# Patient Record
Sex: FEMALE | Race: WHITE | NOT HISPANIC OR LATINO | Employment: OTHER
[De-identification: names, ages, dates, MRNs, and addresses within clinical notes are randomized per-mention and may not be internally consistent; named-entity substitution may affect disease eponyms.]

---

## 2017-01-04 ENCOUNTER — RECORDS - HEALTHEAST (OUTPATIENT)
Dept: ADMINISTRATIVE | Facility: OTHER | Age: 67
End: 2017-01-04

## 2017-01-06 ENCOUNTER — COMMUNICATION - HEALTHEAST (OUTPATIENT)
Dept: FAMILY MEDICINE | Facility: CLINIC | Age: 67
End: 2017-01-06

## 2017-01-06 ENCOUNTER — AMBULATORY - HEALTHEAST (OUTPATIENT)
Dept: FAMILY MEDICINE | Facility: CLINIC | Age: 67
End: 2017-01-06

## 2017-01-25 ENCOUNTER — RECORDS - HEALTHEAST (OUTPATIENT)
Dept: ADMINISTRATIVE | Facility: OTHER | Age: 67
End: 2017-01-25

## 2017-02-07 ENCOUNTER — HOSPITAL ENCOUNTER (EMERGENCY)
Facility: CLINIC | Age: 67
Discharge: HOME | End: 2017-02-07
Attending: NURSE PRACTITIONER | Admitting: NURSE PRACTITIONER
Payer: COMMERCIAL

## 2017-02-07 VITALS
TEMPERATURE: 97.9 F | OXYGEN SATURATION: 95 % | DIASTOLIC BLOOD PRESSURE: 73 MMHG | SYSTOLIC BLOOD PRESSURE: 157 MMHG | RESPIRATION RATE: 16 BRPM

## 2017-02-07 DIAGNOSIS — S41.111A ARM LACERATION, RIGHT, INITIAL ENCOUNTER: ICD-10-CM

## 2017-02-07 PROCEDURE — 12002 RPR S/N/AX/GEN/TRNK2.6-7.5CM: CPT | Performed by: NURSE PRACTITIONER

## 2017-02-07 PROCEDURE — 99213 OFFICE O/P EST LOW 20 MIN: CPT | Mod: 25 | Performed by: NURSE PRACTITIONER

## 2017-02-07 PROCEDURE — 99202 OFFICE O/P NEW SF 15 MIN: CPT | Mod: 25 | Performed by: NURSE PRACTITIONER

## 2017-02-07 ASSESSMENT — ENCOUNTER SYMPTOMS
NEUROLOGICAL NEGATIVE: 1
RESPIRATORY NEGATIVE: 1
CONSTITUTIONAL NEGATIVE: 1

## 2017-02-07 NOTE — ED AVS SNAPSHOT
[facility] Emergency Department    [address] [facility] [address]    Phone:  [phone]    Fax:  [phone]                                       Tayler Mcmahon   MRN: [id number]    Department:  [facility] Emergency Department   Date of Visit:  2/7/2017           After Visit Summary Signature Page     I have received my discharge instructions, and my questions have been answered. I have discussed any challenges I see with this plan with the nurse or doctor.    ..........................................................................................................................................  Patient/Patient Representative Signature      ..........................................................................................................................................  Patient Representative Print Name and Relationship to Patient    ..................................................               ................................................  Date                                            Time    ..........................................................................................................................................  Reviewed by Signature/Title    ...................................................              ..............................................  Date                                                            Time

## 2017-02-07 NOTE — ED AVS SNAPSHOT
[facility] Emergency Department    [address] [facility] [address]    Phone:  [phone]    Fax:  [phone]                                       Tayler Mcmahon   MRN: [id number]    Department:  [facility] Emergency Department   Date of Visit:  2/7/2017           Patient Information     Date Of Birth          1950        Your diagnoses for this visit were:     Arm laceration, right, initial encounter        You were seen by Avery Blackman, ODALYS RUIZ.      Follow-up Information     Please follow up.    Why:  For suture removal        Discharge Instructions          * LACERATION (All Closures)  A laceration is a cut through the skin. This will usually require stitches (sutures) or staples if it is deep. Minor cuts may be treated with a tape closure ( Steri-Strips ) or Dermabond skin glue.       HOME CARE:  PAIN MEDICINE: You may use acetaminophen (Tylenol) 650-1000 mg every 6 hours or ibuprofen (Motrin, Advil) 600 mg every 6-8 hours with food to control pain, if you are able to take these medicines. [NOTE: If you have chronic liver or kidney disease or ever had a stomach ulcer or GI bleeding, talk with your doctor before using these medicines.]  EXTREMITY, FACE or TRUNK WOUNDS:    Keep the wound clean and dry. If a bandage was applied and it becomes wet or dirty, replace it. Otherwise, leave it in place for the first 24 hours.    If stitches or staples were used, clean the wound daily. Protect the wound from sunlight and tanning lamps.    After removing the bandage, wash the area with soap and water. Use a wet cotton swab (Q tip) to loosen and remove any blood or crust that forms.    After cleaning, apply a thin layer of Polysporin or Bacitracin ointment. This will keep the wound clean and make it easier to remove the stitches or staples. Reapply a fresh bandage.    You may remove the bandage to shower as usual after the first 24 hours, but do not soak the area in water (no  swimming) until the stitches or staples are removed.    If Steri-Strips were used, keep the area clean and dry. If it becomes wet, blot it dry with a towel. It is okay to take a brief shower, but avoid scrubbing the area.    If Dermabond skin adhesive was used, do not scratch, rub or pick at the adhesive film. Do not place tape directly over the film. Do not apply liquid, ointment or creams to the wound while the film is in place. Do not clean the wound with peroxide and do not apply ointments. Avoid activities that cause heavy sweating until the film has fallen off. Protect the wound from prolonged exposure to sunlight or tanning lamps. You may shower as usual but do not soak the wound in water (no baths or swimming). The film will fall off by itself in 5-10 days.  SCALP WOUNDS: During the first two days, you may carefully rinse your hair in the shower to remove blood, glass or dirt particles. After two days, you may shower and shampoo your hair normally. Do not soak your scalp in the tub or go swimming until the stitches or staples have been removed.  MOUTH WOUNDS: Eat soft foods to reduce pain. If the cut is inside of your mouth, clean by rinsing after each meal and at bedtime with a mixture of equal parts water and Hydrogen Peroxide (do not swallow!). Or, you can use a cotton swab to directly apply Hydrogen Peroxide onto the cut.  After the wound is done healing, use sunscreen over the area whenever exposed for the next 6 minths to avoid a darker scar.     FOLLOW UP: Most skin wounds heal within ten days. Mouth and facial wounds heal within five days. However, even with proper treatment, a wound infection may sometimes occur. Therefore, you should check the wound daily for signs of infection listed below.  Stitches should be removed from the face within five days; stitches and staples should be removed from other parts of the body within 7-10 days. Unless you are told to come back to the emergency room, you may  have your doctor or urgent care remove the stitches. If dissolving stitches were used in the mouth, these will fall out or dissolve without the need for removal. If tape closures ( Steri-Strips ) were used, remove them yourself if they have not fallen off after 7 days. If Dermabond skin glue was used, the film will fall off by itself in 5-10 days.      GET PROMPT MEDICAL ATTENTION  if any of the following occur:    Increasing pain in the wound    Redness, swelling or pus coming from the wound    Fever over 101 F (38.3 C) oral    If stitches or staples come apart or fall out or if Steri-Strips fall off before seven days    If the wound edges re-open    Bleeding not controlled by direct pressure    1108-7688 [facility], [address] [place] [address]. All rights reserved. This information is not intended as a substitute for professional medical care. Always follow your healthcare professional's instructions.      24 Hour Appointment Hotline       To make an appointment at any [facility], call [phone] ([phone]). If you don't have a family doctor or clinic, we will help you find one. [facility] clinics are conveniently located to serve the needs of you and your family.             Review of your medicines      Notice     You have not been prescribed any medications.            Orders Needing Specimen Collection     None      Pending Results     No orders found from 2/6/2017 to 2/8/2017.            Pending Culture Results     No orders found from 2/6/2017 to 2/8/2017.             Test Results from your hospital stay            Thank you for choosing [facility]       Thank you for choosing [facility] for your care. Our goal is always to provide you with excellent care. Hearing back from our patients is one way we can continue to improve our services. Please take a few minutes to complete the written survey that you may receive in the mail after you visit with us. Thank you!        Jolene  "Information     [facility] lets you send messages to your doctor, view your test results, renew your prescriptions, schedule appointments and more. To sign up, go to [email or web address] . Click on \"Log in\" on the left side of the screen, which will take you to the Welcome page. Then click on \"Sign up Now\" on the right side of the page.     You will be asked to enter the access code listed below, as well as some personal information. Please follow the directions to create your username and password.     Your access code is: [email or web address]  Expires: 2017  1:11 PM     Your access code will  in 90 days. If you need help or a new code, please call your [facility] clinic or [phone].        Care EveryWhere ID     This is your Care EveryWhere ID. This could be used by other organizations to access your [facility] medical records  [id number]        After Visit Summary       This is your record. Keep this with you and show to your community pharmacist(s) and doctor(s) at your next visit.                  "

## 2017-02-07 NOTE — ED PROVIDER NOTES
History     Chief Complaint   Patient presents with     Laceration     right elbow area     HPI  Tayler Mcmahon is a 67 year old female who fell while walking down the stairs carrying a laundry basket. She sustained a laceration as she fell when her arm struck the bottom step. It did not bleed very much. Her tetanus is utd. She has a sensitivity to vicryl suture which caused a significant inflammatory reaction both times she has used it.     I have reviewed the Medications, Allergies, Past Medical and Surgical History, and Social History in the Epic system.    Review of Systems   Constitutional: Negative.    Respiratory: Negative.    Neurological: Negative.    All other systems reviewed and are negative.      Physical Exam   BP: 157/73 mmHg  Heart Rate: 67  Temp: 97.9  F (36.6  C)  Resp: 16  SpO2: 95 %  Physical Exam   Constitutional: She is oriented to person, place, and time. She appears well-developed.   Eyes: Conjunctivae and EOM are normal.   Pulmonary/Chest: Effort normal and breath sounds normal.   Abdominal: Soft.   Musculoskeletal: Normal range of motion. She exhibits no edema or tenderness.   Normal cms to affected limb   Neurological: She is alert and oriented to person, place, and time. No cranial nerve deficit.   Skin: Skin is warm. No rash noted.       ED Course   Laceration repair  Date/Time: 2/7/2017 1:08 PM  Performed by: AYLIN CACERES  Authorized by: AYLIN CACERES  Consent: Verbal consent obtained.  Risks and benefits: risks, benefits and alternatives were discussed  Consent given by: patient  Patient identity confirmed: verbally with patient  Body area: upper extremity  Location details: right lower arm  Laceration length: 4 cm  Foreign bodies: no foreign bodies  Tendon involvement: none  Nerve involvement: none  Vascular damage: no  Anesthesia: local infiltration  Local anesthetic: lidocaine 1% with epinephrine  Anesthetic total: 6 ml  Patient sedated: no  Preparation:  Patient was prepped and draped in the usual sterile fashion.  Irrigation solution: saline  Irrigation method: jet lavage  Amount of cleaning: standard  Skin closure: 4-0 nylon  Number of sutures: 7  Technique: simple and horizontal mattress  Approximation: close  Approximation difficulty: simple  Dressing: antibiotic ointment and 4x4 sterile gauze  Patient tolerance: Patient tolerated the procedure well with no immediate complications  Comments: I used two horizontal mattress sutures for the stellate lesion and 5 simple interupted sutures to complete the repair.                    Labs Ordered and Resulted from Time of ED Arrival Up to the Time of Departure from the ED - No data to display    Assessments & Plan (with Medical Decision Making)   Laceration to right forearm    I have reviewed the nursing notes.    I have reviewed the findings, diagnosis, plan and need for follow up with the patient.  F/u in 10-14 days for suture removal    New Prescriptions    No medications on file       Final diagnoses:   Arm laceration, right, initial encounter       2/7/2017   [facility] EMERGENCY DEPARTMENT      Avery Blackman, ODALYS CNP  02/07/17 1315

## 2017-02-07 NOTE — DISCHARGE INSTRUCTIONS
* LACERATION (All Closures)  A laceration is a cut through the skin. This will usually require stitches (sutures) or staples if it is deep. Minor cuts may be treated with a tape closure ( Steri-Strips ) or Dermabond skin glue.       HOME CARE:  PAIN MEDICINE: You may use acetaminophen (Tylenol) 650-1000 mg every 6 hours or ibuprofen (Motrin, Advil) 600 mg every 6-8 hours with food to control pain, if you are able to take these medicines. [NOTE: If you have chronic liver or kidney disease or ever had a stomach ulcer or GI bleeding, talk with your doctor before using these medicines.]  EXTREMITY, FACE or TRUNK WOUNDS:    Keep the wound clean and dry. If a bandage was applied and it becomes wet or dirty, replace it. Otherwise, leave it in place for the first 24 hours.    If stitches or staples were used, clean the wound daily. Protect the wound from sunlight and tanning lamps.    After removing the bandage, wash the area with soap and water. Use a wet cotton swab (Q tip) to loosen and remove any blood or crust that forms.    After cleaning, apply a thin layer of Polysporin or Bacitracin ointment. This will keep the wound clean and make it easier to remove the stitches or staples. Reapply a fresh bandage.    You may remove the bandage to shower as usual after the first 24 hours, but do not soak the area in water (no swimming) until the stitches or staples are removed.    If Steri-Strips were used, keep the area clean and dry. If it becomes wet, blot it dry with a towel. It is okay to take a brief shower, but avoid scrubbing the area.    If Dermabond skin adhesive was used, do not scratch, rub or pick at the adhesive film. Do not place tape directly over the film. Do not apply liquid, ointment or creams to the wound while the film is in place. Do not clean the wound with peroxide and do not apply ointments. Avoid activities that cause heavy sweating until the film has fallen off. Protect the wound from prolonged  exposure to sunlight or tanning lamps. You may shower as usual but do not soak the wound in water (no baths or swimming). The film will fall off by itself in 5-10 days.  SCALP WOUNDS: During the first two days, you may carefully rinse your hair in the shower to remove blood, glass or dirt particles. After two days, you may shower and shampoo your hair normally. Do not soak your scalp in the tub or go swimming until the stitches or staples have been removed.  MOUTH WOUNDS: Eat soft foods to reduce pain. If the cut is inside of your mouth, clean by rinsing after each meal and at bedtime with a mixture of equal parts water and Hydrogen Peroxide (do not swallow!). Or, you can use a cotton swab to directly apply Hydrogen Peroxide onto the cut.  After the wound is done healing, use sunscreen over the area whenever exposed for the next 6 minths to avoid a darker scar.     FOLLOW UP: Most skin wounds heal within ten days. Mouth and facial wounds heal within five days. However, even with proper treatment, a wound infection may sometimes occur. Therefore, you should check the wound daily for signs of infection listed below.  Stitches should be removed from the face within five days; stitches and staples should be removed from other parts of the body within 7-10 days. Unless you are told to come back to the emergency room, you may have your doctor or urgent care remove the stitches. If dissolving stitches were used in the mouth, these will fall out or dissolve without the need for removal. If tape closures ( Steri-Strips ) were used, remove them yourself if they have not fallen off after 7 days. If Dermabond skin glue was used, the film will fall off by itself in 5-10 days.      GET PROMPT MEDICAL ATTENTION  if any of the following occur:    Increasing pain in the wound    Redness, swelling or pus coming from the wound    Fever over 101 F (38.3 C) oral    If stitches or staples come apart or fall out or if Steri-Strips fall  off before seven days    If the wound edges re-open    Bleeding not controlled by direct pressure    [id number] [employer] [facility], [address] [place] [address], [place] [address]. All rights reserved. This information is not intended as a substitute for professional medical care. Always follow your healthcare professional's instructions.

## 2017-02-15 ENCOUNTER — OFFICE VISIT - HEALTHEAST (OUTPATIENT)
Dept: FAMILY MEDICINE | Facility: CLINIC | Age: 67
End: 2017-02-15

## 2017-02-15 DIAGNOSIS — L03.90 CELLULITIS: ICD-10-CM

## 2017-02-15 ASSESSMENT — MIFFLIN-ST. JEOR: SCORE: 1220.76

## 2017-03-29 ENCOUNTER — RECORDS - HEALTHEAST (OUTPATIENT)
Dept: ADMINISTRATIVE | Facility: OTHER | Age: 67
End: 2017-03-29

## 2017-04-25 ENCOUNTER — HOSPITAL ENCOUNTER (OUTPATIENT)
Dept: CT IMAGING | Facility: HOSPITAL | Age: 67
Discharge: HOME | End: 2017-04-25
Attending: INTERNAL MEDICINE

## 2017-04-25 DIAGNOSIS — R05.9 COUGH: ICD-10-CM

## 2017-04-25 DIAGNOSIS — R91.1 PULMONARY NODULE: ICD-10-CM

## 2017-05-08 ENCOUNTER — RECORDS - HEALTHEAST (OUTPATIENT)
Dept: ADMINISTRATIVE | Facility: OTHER | Age: 67
End: 2017-05-08

## 2017-05-22 ENCOUNTER — COMMUNICATION - HEALTHEAST (OUTPATIENT)
Dept: FAMILY MEDICINE | Facility: CLINIC | Age: 67
End: 2017-05-22

## 2017-05-23 ENCOUNTER — RECORDS - HEALTHEAST (OUTPATIENT)
Dept: ADMINISTRATIVE | Facility: OTHER | Age: 67
End: 2017-05-23

## 2017-06-01 ENCOUNTER — COMMUNICATION - HEALTHEAST (OUTPATIENT)
Dept: FAMILY MEDICINE | Facility: CLINIC | Age: 67
End: 2017-06-01

## 2017-06-01 DIAGNOSIS — R45.86 MOOD CHANGES: ICD-10-CM

## 2017-06-08 ENCOUNTER — COMMUNICATION - HEALTHEAST (OUTPATIENT)
Dept: FAMILY MEDICINE | Facility: CLINIC | Age: 67
End: 2017-06-08

## 2017-06-09 ENCOUNTER — AMBULATORY - HEALTHEAST (OUTPATIENT)
Dept: FAMILY MEDICINE | Facility: CLINIC | Age: 67
End: 2017-06-09

## 2017-06-12 ENCOUNTER — OFFICE VISIT - HEALTHEAST (OUTPATIENT)
Dept: FAMILY MEDICINE | Facility: CLINIC | Age: 67
End: 2017-06-12

## 2017-06-12 DIAGNOSIS — J40 BRONCHITIS: ICD-10-CM

## 2017-06-13 ENCOUNTER — COMMUNICATION - HEALTHEAST (OUTPATIENT)
Dept: FAMILY MEDICINE | Facility: CLINIC | Age: 67
End: 2017-06-13

## 2017-07-05 ENCOUNTER — COMMUNICATION - HEALTHEAST (OUTPATIENT)
Dept: FAMILY MEDICINE | Facility: CLINIC | Age: 67
End: 2017-07-05

## 2017-07-05 DIAGNOSIS — K21.9 GERD (GASTROESOPHAGEAL REFLUX DISEASE): ICD-10-CM

## 2017-07-05 DIAGNOSIS — R45.86 MOOD CHANGES: ICD-10-CM

## 2017-10-13 ENCOUNTER — COMMUNICATION - HEALTHEAST (OUTPATIENT)
Dept: FAMILY MEDICINE | Facility: CLINIC | Age: 67
End: 2017-10-13

## 2017-10-13 DIAGNOSIS — R45.86 MOOD CHANGES: ICD-10-CM

## 2017-10-13 DIAGNOSIS — K21.9 GERD (GASTROESOPHAGEAL REFLUX DISEASE): ICD-10-CM

## 2017-11-08 ENCOUNTER — COMMUNICATION - HEALTHEAST (OUTPATIENT)
Dept: FAMILY MEDICINE | Facility: CLINIC | Age: 67
End: 2017-11-08

## 2017-12-30 ENCOUNTER — COMMUNICATION - HEALTHEAST (OUTPATIENT)
Dept: FAMILY MEDICINE | Facility: CLINIC | Age: 67
End: 2017-12-30

## 2017-12-30 DIAGNOSIS — K21.9 GERD (GASTROESOPHAGEAL REFLUX DISEASE): ICD-10-CM

## 2018-01-08 ENCOUNTER — RECORDS - HEALTHEAST (OUTPATIENT)
Dept: ADMINISTRATIVE | Facility: OTHER | Age: 68
End: 2018-01-08

## 2018-01-12 ENCOUNTER — OFFICE VISIT - HEALTHEAST (OUTPATIENT)
Dept: FAMILY MEDICINE | Facility: CLINIC | Age: 68
End: 2018-01-12

## 2018-01-12 DIAGNOSIS — R20.8 RECTAL BURNING: ICD-10-CM

## 2018-01-12 DIAGNOSIS — N94.89 VAGINAL BURNING: ICD-10-CM

## 2018-01-12 LAB
CLUE CELLS: NORMAL
TRICHOMONAS, WET PREP: NORMAL
YEAST, WET PREP: NORMAL

## 2018-03-08 ENCOUNTER — COMMUNICATION - HEALTHEAST (OUTPATIENT)
Dept: FAMILY MEDICINE | Facility: CLINIC | Age: 68
End: 2018-03-08

## 2018-03-08 DIAGNOSIS — K21.9 GERD (GASTROESOPHAGEAL REFLUX DISEASE): ICD-10-CM

## 2018-03-08 DIAGNOSIS — R45.86 MOOD CHANGES: ICD-10-CM

## 2018-03-22 ENCOUNTER — HOSPITAL ENCOUNTER (OUTPATIENT)
Dept: MAMMOGRAPHY | Facility: CLINIC | Age: 68
Discharge: HOME | End: 2018-03-22
Attending: FAMILY MEDICINE

## 2018-03-22 DIAGNOSIS — Z12.31 VISIT FOR SCREENING MAMMOGRAM: ICD-10-CM

## 2018-04-09 ENCOUNTER — OFFICE VISIT - HEALTHEAST (OUTPATIENT)
Dept: FAMILY MEDICINE | Facility: CLINIC | Age: 68
End: 2018-04-09

## 2018-04-09 DIAGNOSIS — Z23 NEED FOR VACCINATION: ICD-10-CM

## 2018-04-09 DIAGNOSIS — Z00.00 HEALTHCARE MAINTENANCE: ICD-10-CM

## 2018-04-09 DIAGNOSIS — K21.9 GASTROESOPHAGEAL REFLUX DISEASE WITHOUT ESOPHAGITIS: ICD-10-CM

## 2018-04-09 DIAGNOSIS — Z78.0 POST-MENOPAUSAL: ICD-10-CM

## 2018-04-09 DIAGNOSIS — E83.42 HYPOMAGNESEMIA: ICD-10-CM

## 2018-04-09 DIAGNOSIS — F32.5 MAJOR DEPRESSIVE DISORDER WITH SINGLE EPISODE, IN FULL REMISSION (H): ICD-10-CM

## 2018-04-09 LAB
ALBUMIN SERPL-MCNC: 3.6 G/DL (ref 3.5–5)
ALP SERPL-CCNC: 90 U/L (ref 45–120)
ALT SERPL W P-5'-P-CCNC: 24 U/L (ref 0–45)
ANION GAP SERPL CALCULATED.3IONS-SCNC: 9 MMOL/L (ref 5–18)
AST SERPL W P-5'-P-CCNC: 17 U/L (ref 0–40)
BILIRUB SERPL-MCNC: 0.5 MG/DL (ref 0–1)
BUN SERPL-MCNC: 17 MG/DL (ref 8–22)
CALCIUM SERPL-MCNC: 9.5 MG/DL (ref 8.5–10.5)
CHLORIDE BLD-SCNC: 103 MMOL/L (ref 98–107)
CHOLEST SERPL-MCNC: 237 MG/DL
CO2 SERPL-SCNC: 28 MMOL/L (ref 22–31)
CREAT SERPL-MCNC: 0.74 MG/DL (ref 0.6–1.1)
FASTING STATUS PATIENT QL REPORTED: YES
GFR SERPL CREATININE-BSD FRML MDRD: >60 ML/MIN/1.73M2
GLUCOSE BLD-MCNC: 100 MG/DL (ref 70–125)
HDLC SERPL-MCNC: 54 MG/DL
LDLC SERPL CALC-MCNC: 152 MG/DL
MAGNESIUM SERPL-MCNC: 1.8 MG/DL (ref 1.8–2.6)
POTASSIUM BLD-SCNC: 4.9 MMOL/L (ref 3.5–5)
PROT SERPL-MCNC: 6.9 G/DL (ref 6–8)
SODIUM SERPL-SCNC: 140 MMOL/L (ref 136–145)
TRIGL SERPL-MCNC: 155 MG/DL

## 2018-04-09 ASSESSMENT — MIFFLIN-ST. JEOR: SCORE: 1242.87

## 2018-04-16 ENCOUNTER — AMBULATORY - HEALTHEAST (OUTPATIENT)
Dept: SCHEDULING | Facility: CLINIC | Age: 68
End: 2018-04-16

## 2018-04-16 ENCOUNTER — COMMUNICATION - HEALTHEAST (OUTPATIENT)
Dept: FAMILY MEDICINE | Facility: CLINIC | Age: 68
End: 2018-04-16

## 2018-04-16 DIAGNOSIS — Z78.0 POST-MENOPAUSAL: ICD-10-CM

## 2018-04-17 ENCOUNTER — COMMUNICATION - HEALTHEAST (OUTPATIENT)
Dept: FAMILY MEDICINE | Facility: CLINIC | Age: 68
End: 2018-04-17

## 2018-04-18 ENCOUNTER — COMMUNICATION - HEALTHEAST (OUTPATIENT)
Dept: FAMILY MEDICINE | Facility: CLINIC | Age: 68
End: 2018-04-18

## 2018-05-07 ENCOUNTER — COMMUNICATION - HEALTHEAST (OUTPATIENT)
Dept: FAMILY MEDICINE | Facility: CLINIC | Age: 68
End: 2018-05-07

## 2018-12-24 ENCOUNTER — COMMUNICATION - HEALTHEAST (OUTPATIENT)
Dept: SCHEDULING | Facility: CLINIC | Age: 68
End: 2018-12-24

## 2019-01-07 ENCOUNTER — COMMUNICATION - HEALTHEAST (OUTPATIENT)
Dept: FAMILY MEDICINE | Facility: CLINIC | Age: 69
End: 2019-01-07

## 2019-01-07 DIAGNOSIS — K21.9 GERD (GASTROESOPHAGEAL REFLUX DISEASE): ICD-10-CM

## 2019-01-07 DIAGNOSIS — R45.86 MOOD CHANGES: ICD-10-CM

## 2019-03-12 ENCOUNTER — COMMUNICATION - HEALTHEAST (OUTPATIENT)
Dept: FAMILY MEDICINE | Facility: CLINIC | Age: 69
End: 2019-03-12

## 2019-03-12 DIAGNOSIS — K21.9 GERD (GASTROESOPHAGEAL REFLUX DISEASE): ICD-10-CM

## 2019-03-12 DIAGNOSIS — R45.86 MOOD CHANGES: ICD-10-CM

## 2019-04-05 ENCOUNTER — RECORDS - HEALTHEAST (OUTPATIENT)
Dept: ADMINISTRATIVE | Facility: OTHER | Age: 69
End: 2019-04-05

## 2019-04-29 ENCOUNTER — RECORDS - HEALTHEAST (OUTPATIENT)
Dept: ADMINISTRATIVE | Facility: OTHER | Age: 69
End: 2019-04-29

## 2019-05-02 ENCOUNTER — OFFICE VISIT - HEALTHEAST (OUTPATIENT)
Dept: FAMILY MEDICINE | Facility: CLINIC | Age: 69
End: 2019-05-02

## 2019-05-02 DIAGNOSIS — Z12.31 VISIT FOR SCREENING MAMMOGRAM: ICD-10-CM

## 2019-05-02 DIAGNOSIS — I51.89 DIASTOLIC DYSFUNCTION: ICD-10-CM

## 2019-05-02 DIAGNOSIS — M62.830 BACK MUSCLE SPASM: ICD-10-CM

## 2019-05-02 DIAGNOSIS — R10.11 ABDOMINAL PAIN, RIGHT UPPER QUADRANT: ICD-10-CM

## 2019-05-02 DIAGNOSIS — R10.12 ABDOMINAL PAIN, LEFT UPPER QUADRANT: ICD-10-CM

## 2019-05-02 DIAGNOSIS — Z00.00 ROUTINE GENERAL MEDICAL EXAMINATION AT A HEALTH CARE FACILITY: ICD-10-CM

## 2019-05-02 DIAGNOSIS — I77.810 DILATION OF THORACIC AORTA (H): ICD-10-CM

## 2019-05-02 DIAGNOSIS — R91.8 PULMONARY NODULES: ICD-10-CM

## 2019-05-02 DIAGNOSIS — R05.3 CHRONIC COUGH: ICD-10-CM

## 2019-05-02 DIAGNOSIS — E55.9 VITAMIN D DEFICIENCY: ICD-10-CM

## 2019-05-02 DIAGNOSIS — K21.9 GERD (GASTROESOPHAGEAL REFLUX DISEASE): ICD-10-CM

## 2019-05-02 DIAGNOSIS — E78.5 HYPERLIPIDEMIA, UNSPECIFIED HYPERLIPIDEMIA TYPE: ICD-10-CM

## 2019-05-02 DIAGNOSIS — R45.86 MOOD CHANGES: ICD-10-CM

## 2019-05-02 DIAGNOSIS — R53.83 FATIGUE, UNSPECIFIED TYPE: ICD-10-CM

## 2019-05-02 DIAGNOSIS — Z12.4 SCREENING FOR CERVICAL CANCER: ICD-10-CM

## 2019-05-02 ASSESSMENT — MIFFLIN-ST. JEOR: SCORE: 1232.1

## 2019-05-03 LAB
HPV SOURCE: NORMAL
HUMAN PAPILLOMA VIRUS 16 DNA: NEGATIVE
HUMAN PAPILLOMA VIRUS 18 DNA: NEGATIVE
HUMAN PAPILLOMA VIRUS FINAL DIAGNOSIS: NORMAL
HUMAN PAPILLOMA VIRUS OTHER HR: NEGATIVE
SPECIMEN DESCRIPTION: NORMAL

## 2019-05-06 ENCOUNTER — AMBULATORY - HEALTHEAST (OUTPATIENT)
Dept: LAB | Facility: CLINIC | Age: 69
End: 2019-05-06

## 2019-05-06 DIAGNOSIS — E78.5 HYPERLIPIDEMIA, UNSPECIFIED HYPERLIPIDEMIA TYPE: ICD-10-CM

## 2019-05-06 DIAGNOSIS — E55.9 VITAMIN D DEFICIENCY: ICD-10-CM

## 2019-05-06 DIAGNOSIS — R53.83 FATIGUE, UNSPECIFIED TYPE: ICD-10-CM

## 2019-05-06 LAB
25(OH)D3 SERPL-MCNC: 35.5 NG/ML (ref 30–80)
25(OH)D3 SERPL-MCNC: 35.5 NG/ML (ref 30–80)
ALBUMIN SERPL-MCNC: 3.8 G/DL (ref 3.5–5)
ALP SERPL-CCNC: 74 U/L (ref 45–120)
ALT SERPL W P-5'-P-CCNC: 23 U/L (ref 0–45)
ANION GAP SERPL CALCULATED.3IONS-SCNC: 12 MMOL/L (ref 5–18)
AST SERPL W P-5'-P-CCNC: 20 U/L (ref 0–40)
BILIRUB SERPL-MCNC: 0.6 MG/DL (ref 0–1)
BUN SERPL-MCNC: 24 MG/DL (ref 8–28)
CALCIUM SERPL-MCNC: 9.7 MG/DL (ref 8.5–10.5)
CHLORIDE BLD-SCNC: 105 MMOL/L (ref 98–107)
CHOLEST SERPL-MCNC: 193 MG/DL
CO2 SERPL-SCNC: 22 MMOL/L (ref 22–31)
CREAT SERPL-MCNC: 0.8 MG/DL (ref 0.6–1.1)
ERYTHROCYTE [DISTWIDTH] IN BLOOD BY AUTOMATED COUNT: 12 % (ref 11–14.5)
FASTING STATUS PATIENT QL REPORTED: YES
GFR SERPL CREATININE-BSD FRML MDRD: >60 ML/MIN/1.73M2
GLUCOSE BLD-MCNC: 99 MG/DL (ref 70–125)
HCT VFR BLD AUTO: 40.9 % (ref 35–47)
HDLC SERPL-MCNC: 54 MG/DL
HGB BLD-MCNC: 13.6 G/DL (ref 12–16)
LDLC SERPL CALC-MCNC: 119 MG/DL
MCH RBC QN AUTO: 30.7 PG (ref 27–34)
MCHC RBC AUTO-ENTMCNC: 33.2 G/DL (ref 32–36)
MCV RBC AUTO: 92 FL (ref 80–100)
PLATELET # BLD AUTO: 192 THOU/UL (ref 140–440)
PMV BLD AUTO: 7.9 FL (ref 7–10)
POTASSIUM BLD-SCNC: 4.7 MMOL/L (ref 3.5–5)
PROT SERPL-MCNC: 6.6 G/DL (ref 6–8)
RBC # BLD AUTO: 4.42 MILL/UL (ref 3.8–5.4)
SODIUM SERPL-SCNC: 139 MMOL/L (ref 136–145)
TRIGL SERPL-MCNC: 99 MG/DL
TSH SERPL DL<=0.005 MIU/L-ACNC: 0.55 UIU/ML (ref 0.3–5)
WBC: 5 THOU/UL (ref 4–11)

## 2019-05-09 LAB
BKR LAB AP ABNORMAL BLEEDING: NO
BKR LAB AP BIRTH CONTROL/HORMONES: NORMAL
BKR LAB AP CERVICAL APPEARANCE: NORMAL
BKR LAB AP GYN ADEQUACY: NORMAL
BKR LAB AP GYN INTERPRETATION: NORMAL
BKR LAB AP HPV REFLEX: NORMAL
BKR LAB AP LMP: NORMAL
BKR LAB AP PATIENT STATUS: NORMAL
BKR LAB AP PREVIOUS ABNORMAL: NO
BKR LAB AP PREVIOUS NORMAL: NORMAL
HIGH RISK?: NO
PATH REPORT.COMMENTS IMP SPEC: NORMAL
RESULT FLAG (HE HISTORICAL CONVERSION): NORMAL

## 2019-05-23 ENCOUNTER — COMMUNICATION - HEALTHEAST (OUTPATIENT)
Dept: FAMILY MEDICINE | Facility: CLINIC | Age: 69
End: 2019-05-23

## 2019-07-22 ENCOUNTER — HOSPITAL ENCOUNTER (OUTPATIENT)
Dept: MAMMOGRAPHY | Facility: CLINIC | Age: 69
Discharge: HOME | End: 2019-07-22
Attending: FAMILY MEDICINE

## 2019-07-22 DIAGNOSIS — Z12.31 VISIT FOR SCREENING MAMMOGRAM: ICD-10-CM

## 2019-07-26 ENCOUNTER — COMMUNICATION - HEALTHEAST (OUTPATIENT)
Dept: FAMILY MEDICINE | Facility: CLINIC | Age: 69
End: 2019-07-26

## 2019-09-16 ENCOUNTER — RECORDS - HEALTHEAST (OUTPATIENT)
Dept: ADMINISTRATIVE | Facility: OTHER | Age: 69
End: 2019-09-16

## 2020-01-11 ENCOUNTER — COMMUNICATION - HEALTHEAST (OUTPATIENT)
Dept: FAMILY MEDICINE | Facility: CLINIC | Age: 70
End: 2020-01-11

## 2020-02-18 ENCOUNTER — RECORDS - HEALTHEAST (OUTPATIENT)
Dept: GENERAL RADIOLOGY | Facility: CLINIC | Age: 70
End: 2020-02-18

## 2020-02-18 ENCOUNTER — OFFICE VISIT - HEALTHEAST (OUTPATIENT)
Dept: FAMILY MEDICINE | Facility: CLINIC | Age: 70
End: 2020-02-18

## 2020-02-18 DIAGNOSIS — R93.89 ABNORMAL CXR: ICD-10-CM

## 2020-02-18 DIAGNOSIS — J20.9 ACUTE BRONCHITIS, UNSPECIFIED ORGANISM: ICD-10-CM

## 2020-02-18 DIAGNOSIS — J31.0 CHRONIC RHINITIS: ICD-10-CM

## 2020-02-18 DIAGNOSIS — R05.9 COUGH: ICD-10-CM

## 2020-02-18 DIAGNOSIS — R68.89 OTHER GENERAL SYMPTOMS AND SIGNS: ICD-10-CM

## 2020-02-19 ENCOUNTER — COMMUNICATION - HEALTHEAST (OUTPATIENT)
Dept: FAMILY MEDICINE | Facility: CLINIC | Age: 70
End: 2020-02-19

## 2020-02-24 ENCOUNTER — COMMUNICATION - HEALTHEAST (OUTPATIENT)
Dept: FAMILY MEDICINE | Facility: CLINIC | Age: 70
End: 2020-02-24

## 2020-02-24 DIAGNOSIS — J20.9 ACUTE BRONCHITIS, UNSPECIFIED ORGANISM: ICD-10-CM

## 2020-02-24 RX ORDER — ALBUTEROL SULFATE 90 UG/1
2 AEROSOL, METERED RESPIRATORY (INHALATION) EVERY 4 HOURS PRN
Qty: 1 EACH | Refills: 0 | Status: SHIPPED | OUTPATIENT
Start: 2020-02-24

## 2020-03-06 ENCOUNTER — OFFICE VISIT - HEALTHEAST (OUTPATIENT)
Dept: FAMILY MEDICINE | Facility: CLINIC | Age: 70
End: 2020-03-06

## 2020-03-06 ENCOUNTER — COMMUNICATION - HEALTHEAST (OUTPATIENT)
Dept: SCHEDULING | Facility: CLINIC | Age: 70
End: 2020-03-06

## 2020-03-06 DIAGNOSIS — J01.00 ACUTE NON-RECURRENT MAXILLARY SINUSITIS: ICD-10-CM

## 2020-03-09 ENCOUNTER — RECORDS - HEALTHEAST (OUTPATIENT)
Dept: ADMINISTRATIVE | Facility: OTHER | Age: 70
End: 2020-03-09

## 2020-03-09 ENCOUNTER — HOSPITAL ENCOUNTER (OUTPATIENT)
Dept: CARDIOLOGY | Facility: HOSPITAL | Age: 70
Discharge: HOME | End: 2020-03-09
Attending: FAMILY MEDICINE

## 2020-03-09 DIAGNOSIS — R93.89 ABNORMAL CXR: ICD-10-CM

## 2020-03-09 DIAGNOSIS — R68.89 OTHER GENERAL SYMPTOMS AND SIGNS: ICD-10-CM

## 2020-03-09 LAB
AORTIC ROOT: 2.9 CM
AORTIC VALVE MEAN VELOCITY: 98.7 CM/S
ASCENDING AORTA: 3.7 CM
AV DIMENSIONLESS INDEX VTI: 1
AV MEAN GRADIENT: 4 MMHG
AV PEAK GRADIENT: 8.9 MMHG
AV VALVE AREA: 2.6 CM2
AV VELOCITY RATIO: 1
BSA FOR ECHO PROCEDURE: 1.84 M2
CV BLOOD PRESSURE: NORMAL MMHG
CV ECHO HEIGHT: 62 IN
CV ECHO WEIGHT: 171 LBS
DOP CALC AO PEAK VEL: 149 CM/S
DOP CALC AO VTI: 33.3 CM
DOP CALC LVOT AREA: 2.54 CM2
DOP CALC LVOT DIAMETER: 1.8 CM
DOP CALC LVOT PEAK VEL: 145 CM/S
DOP CALC LVOT STROKE VOLUME: 84.9 CM3
DOP CALCLVOT PEAK VEL VTI: 33.4 CM
EJECTION FRACTION: 79 % (ref 55–75)
LA AREA 1: 17.9 CM2
LA AREA 2: 16.1 CM2
LEFT ATRIUM LENGTH: 4.8 CM
LEFT ATRIUM VOLUME INDEX: 27.7 ML/M2
LEFT ATRIUM VOLUME: 51 ML
LEFT VENTRICLE CARDIAC INDEX: 2.8 L/MIN/M2
LEFT VENTRICLE CARDIAC OUTPUT: 5.1 L/MIN
LEFT VENTRICLE DIASTOLIC VOLUME INDEX: 44.7 CM3/M2 (ref 29–61)
LEFT VENTRICLE DIASTOLIC VOLUME: 82.2 CM3 (ref 46–106)
LEFT VENTRICLE HEART RATE: 60 BPM
LEFT VENTRICLE SYSTOLIC VOLUME INDEX: 9.3 CM3/M2 (ref 8–24)
LEFT VENTRICLE SYSTOLIC VOLUME: 17.2 CM3 (ref 14–42)
LEFT VENTRICULAR OUTFLOW TRACT MEAN GRADIENT: 4 MMHG
LEFT VENTRICULAR OUTFLOW TRACT MEAN VELOCITY: 92.6 CM/S
LEFT VENTRICULAR OUTFLOW TRACT PEAK GRADIENT: 8 MMHG
LV STROKE VOLUME INDEX: 46.2 ML/M2
MITRAL VALVE DECELERATION SLOPE: 3570 MM/S2
MITRAL VALVE E/A RATIO: 0.8
MITRAL VALVE PRESSURE HALF-TIME: 71 MS
MV AVERAGE E/E' RATIO: 13.7 CM/S
MV DECELERATION TIME: 243 MS
MV E'TISSUE VEL-LAT: 7.34 CM/S
MV E'TISSUE VEL-MED: 5.33 CM/S
MV LATERAL E/E' RATIO: 11.8
MV MEDIAL E/E' RATIO: 16.3
MV PEAK A VELOCITY: 112 CM/S
MV PEAK E VELOCITY: 86.8 CM/S
MV VALVE AREA PRESSURE 1/2 METHOD: 3.1 CM2
NUC REST DIASTOLIC VOLUME INDEX: 2731.2 LBS
NUC REST SYSTOLIC VOLUME INDEX: 62 IN
TRICUSPID REGURGITATION PEAK PRESSURE GRADIENT: 16.3 MMHG
TRICUSPID VALVE ANULAR PLANE SYSTOLIC EXCURSION: 2.3 CM
TRICUSPID VALVE PEAK REGURGITANT VELOCITY: 202 CM/S

## 2020-03-09 ASSESSMENT — MIFFLIN-ST. JEOR: SCORE: 1237.54

## 2020-03-10 ENCOUNTER — COMMUNICATION - HEALTHEAST (OUTPATIENT)
Dept: FAMILY MEDICINE | Facility: CLINIC | Age: 70
End: 2020-03-10

## 2020-05-07 ENCOUNTER — RECORDS - HEALTHEAST (OUTPATIENT)
Dept: ADMINISTRATIVE | Facility: OTHER | Age: 70
End: 2020-05-07

## 2020-05-14 ENCOUNTER — OFFICE VISIT - HEALTHEAST (OUTPATIENT)
Dept: FAMILY MEDICINE | Facility: CLINIC | Age: 70
End: 2020-05-14

## 2020-05-14 ENCOUNTER — AMBULATORY - HEALTHEAST (OUTPATIENT)
Dept: FAMILY MEDICINE | Facility: CLINIC | Age: 70
End: 2020-05-14

## 2020-05-14 ENCOUNTER — AMBULATORY - HEALTHEAST (OUTPATIENT)
Dept: LAB | Facility: CLINIC | Age: 70
End: 2020-05-14

## 2020-05-14 ENCOUNTER — COMMUNICATION - HEALTHEAST (OUTPATIENT)
Dept: SCHEDULING | Facility: CLINIC | Age: 70
End: 2020-05-14

## 2020-05-14 DIAGNOSIS — N30.01 ACUTE CYSTITIS WITH HEMATURIA: ICD-10-CM

## 2020-05-14 DIAGNOSIS — R30.0 DYSURIA: ICD-10-CM

## 2020-05-14 LAB
ALBUMIN UR-MCNC: NEGATIVE MG/DL
APPEARANCE UR: CLEAR
BACTERIA #/AREA URNS HPF: ABNORMAL HPF
BILIRUB UR QL STRIP: NEGATIVE
COLOR UR AUTO: YELLOW
GLUCOSE UR STRIP-MCNC: NEGATIVE MG/DL
HGB UR QL STRIP: ABNORMAL
KETONES UR STRIP-MCNC: NEGATIVE MG/DL
LEUKOCYTE ESTERASE UR QL STRIP: ABNORMAL
NITRATE UR QL: NEGATIVE
PH UR STRIP: 7.5 [PH] (ref 5–8)
RBC #/AREA URNS AUTO: ABNORMAL HPF
SP GR UR STRIP: 1.01 (ref 1–1.03)
SQUAMOUS #/AREA URNS AUTO: ABNORMAL LPF
UROBILINOGEN UR STRIP-ACNC: ABNORMAL
WBC #/AREA URNS AUTO: ABNORMAL HPF

## 2020-05-17 ENCOUNTER — COMMUNICATION - HEALTHEAST (OUTPATIENT)
Dept: FAMILY MEDICINE | Facility: CLINIC | Age: 70
End: 2020-05-17

## 2020-05-17 DIAGNOSIS — N30.00 ACUTE CYSTITIS WITHOUT HEMATURIA: ICD-10-CM

## 2020-05-17 LAB
BACTERIA SPEC CULT: ABNORMAL
BACTERIA SPEC CULT: ABNORMAL

## 2020-05-27 ENCOUNTER — COMMUNICATION - HEALTHEAST (OUTPATIENT)
Dept: FAMILY MEDICINE | Facility: CLINIC | Age: 70
End: 2020-05-27

## 2020-05-27 DIAGNOSIS — R45.86 MOOD CHANGES: ICD-10-CM

## 2020-05-27 DIAGNOSIS — K21.9 GERD (GASTROESOPHAGEAL REFLUX DISEASE): ICD-10-CM

## 2020-07-15 ENCOUNTER — COMMUNICATION - HEALTHEAST (OUTPATIENT)
Dept: SCHEDULING | Facility: CLINIC | Age: 70
End: 2020-07-15

## 2020-07-15 ENCOUNTER — AMBULATORY - HEALTHEAST (OUTPATIENT)
Dept: LAB | Facility: CLINIC | Age: 70
End: 2020-07-15

## 2020-07-15 ENCOUNTER — AMBULATORY - HEALTHEAST (OUTPATIENT)
Dept: FAMILY MEDICINE | Facility: CLINIC | Age: 70
End: 2020-07-15

## 2020-07-15 DIAGNOSIS — R30.0 DYSURIA: ICD-10-CM

## 2020-07-15 LAB
ALBUMIN UR-MCNC: NEGATIVE MG/DL
APPEARANCE UR: CLEAR
BACTERIA #/AREA URNS HPF: ABNORMAL HPF
BILIRUB UR QL STRIP: NEGATIVE
COLOR UR AUTO: YELLOW
GLUCOSE UR STRIP-MCNC: NEGATIVE MG/DL
HGB UR QL STRIP: ABNORMAL
KETONES UR STRIP-MCNC: NEGATIVE MG/DL
LEUKOCYTE ESTERASE UR QL STRIP: ABNORMAL
NITRATE UR QL: NEGATIVE
PH UR STRIP: 7.5 [PH] (ref 5–8)
RBC #/AREA URNS AUTO: ABNORMAL HPF
SP GR UR STRIP: 1.02 (ref 1–1.03)
SQUAMOUS #/AREA URNS AUTO: ABNORMAL LPF
UROBILINOGEN UR STRIP-ACNC: ABNORMAL
WBC #/AREA URNS AUTO: ABNORMAL HPF

## 2020-07-17 ENCOUNTER — OFFICE VISIT - HEALTHEAST (OUTPATIENT)
Dept: FAMILY MEDICINE | Facility: CLINIC | Age: 70
End: 2020-07-17

## 2020-07-17 DIAGNOSIS — N39.0 ACUTE UTI (URINARY TRACT INFECTION): ICD-10-CM

## 2020-07-17 LAB — BACTERIA SPEC CULT: ABNORMAL

## 2020-07-24 ENCOUNTER — COMMUNICATION - HEALTHEAST (OUTPATIENT)
Dept: FAMILY MEDICINE | Facility: CLINIC | Age: 70
End: 2020-07-24

## 2020-07-24 DIAGNOSIS — Z87.440 PERSONAL HISTORY OF URINARY TRACT INFECTION: ICD-10-CM

## 2020-07-27 ENCOUNTER — AMBULATORY - HEALTHEAST (OUTPATIENT)
Dept: LAB | Facility: CLINIC | Age: 70
End: 2020-07-27

## 2020-07-27 ENCOUNTER — COMMUNICATION - HEALTHEAST (OUTPATIENT)
Dept: FAMILY MEDICINE | Facility: CLINIC | Age: 70
End: 2020-07-27

## 2020-07-27 DIAGNOSIS — Z87.440 PERSONAL HISTORY OF URINARY TRACT INFECTION: ICD-10-CM

## 2020-07-27 LAB
ALBUMIN UR-MCNC: NEGATIVE MG/DL
APPEARANCE UR: CLEAR
BILIRUB UR QL STRIP: NEGATIVE
COLOR UR AUTO: YELLOW
GLUCOSE UR STRIP-MCNC: NEGATIVE MG/DL
HGB UR QL STRIP: NEGATIVE
KETONES UR STRIP-MCNC: NEGATIVE MG/DL
LEUKOCYTE ESTERASE UR QL STRIP: NEGATIVE
NITRATE UR QL: NEGATIVE
PH UR STRIP: 7 [PH] (ref 5–8)
SP GR UR STRIP: 1.02 (ref 1–1.03)
UROBILINOGEN UR STRIP-ACNC: NORMAL

## 2020-08-19 ENCOUNTER — RECORDS - HEALTHEAST (OUTPATIENT)
Dept: ADMINISTRATIVE | Facility: OTHER | Age: 70
End: 2020-08-19

## 2020-09-08 ENCOUNTER — RECORDS - HEALTHEAST (OUTPATIENT)
Dept: ADMINISTRATIVE | Facility: OTHER | Age: 70
End: 2020-09-08

## 2020-10-19 ENCOUNTER — OFFICE VISIT - HEALTHEAST (OUTPATIENT)
Dept: FAMILY MEDICINE | Facility: CLINIC | Age: 70
End: 2020-10-19

## 2020-10-19 DIAGNOSIS — Z00.00 ROUTINE GENERAL MEDICAL EXAMINATION AT A HEALTH CARE FACILITY: ICD-10-CM

## 2020-10-19 DIAGNOSIS — Z12.31 VISIT FOR SCREENING MAMMOGRAM: ICD-10-CM

## 2020-10-19 DIAGNOSIS — E55.9 VITAMIN D DEFICIENCY: ICD-10-CM

## 2020-10-19 DIAGNOSIS — E78.5 HYPERLIPIDEMIA, UNSPECIFIED HYPERLIPIDEMIA TYPE: ICD-10-CM

## 2020-10-19 DIAGNOSIS — R63.5 WEIGHT GAIN: ICD-10-CM

## 2020-10-19 DIAGNOSIS — Z13.6 ENCOUNTER FOR SCREENING FOR CARDIOVASCULAR DISORDERS: ICD-10-CM

## 2020-10-19 DIAGNOSIS — Z78.0 MENOPAUSE: ICD-10-CM

## 2020-10-19 DIAGNOSIS — R05.3 CHRONIC COUGH: ICD-10-CM

## 2020-10-19 DIAGNOSIS — K21.00 GASTROESOPHAGEAL REFLUX DISEASE WITH ESOPHAGITIS WITHOUT HEMORRHAGE: ICD-10-CM

## 2020-10-19 LAB
ALBUMIN SERPL-MCNC: 4 G/DL (ref 3.5–5)
ALP SERPL-CCNC: 73 U/L (ref 45–120)
ALT SERPL W P-5'-P-CCNC: 25 U/L (ref 0–45)
ANION GAP SERPL CALCULATED.3IONS-SCNC: 8 MMOL/L (ref 5–18)
AST SERPL W P-5'-P-CCNC: 19 U/L (ref 0–40)
BILIRUB SERPL-MCNC: 0.5 MG/DL (ref 0–1)
BUN SERPL-MCNC: 20 MG/DL (ref 8–28)
CALCIUM SERPL-MCNC: 9.5 MG/DL (ref 8.5–10.5)
CHLORIDE BLD-SCNC: 102 MMOL/L (ref 98–107)
CHOLEST SERPL-MCNC: 254 MG/DL
CO2 SERPL-SCNC: 28 MMOL/L (ref 22–31)
CREAT SERPL-MCNC: 0.86 MG/DL (ref 0.6–1.1)
ERYTHROCYTE [DISTWIDTH] IN BLOOD BY AUTOMATED COUNT: 11.4 % (ref 11–14.5)
FASTING STATUS PATIENT QL REPORTED: YES
GFR SERPL CREATININE-BSD FRML MDRD: >60 ML/MIN/1.73M2
GLUCOSE BLD-MCNC: 102 MG/DL (ref 70–125)
HCT VFR BLD AUTO: 45.9 % (ref 35–47)
HDLC SERPL-MCNC: 54 MG/DL
HGB BLD-MCNC: 15.4 G/DL (ref 12–16)
LDLC SERPL CALC-MCNC: 160 MG/DL
MCH RBC QN AUTO: 33.1 PG (ref 27–34)
MCHC RBC AUTO-ENTMCNC: 33.6 G/DL (ref 32–36)
MCV RBC AUTO: 99 FL (ref 80–100)
PLATELET # BLD AUTO: 207 THOU/UL (ref 140–440)
PMV BLD AUTO: 8.1 FL (ref 7–10)
POTASSIUM BLD-SCNC: 4.9 MMOL/L (ref 3.5–5)
PROT SERPL-MCNC: 7.1 G/DL (ref 6–8)
RBC # BLD AUTO: 4.66 MILL/UL (ref 3.8–5.4)
SODIUM SERPL-SCNC: 138 MMOL/L (ref 136–145)
TRIGL SERPL-MCNC: 200 MG/DL
TSH SERPL DL<=0.005 MIU/L-ACNC: 0.48 UIU/ML (ref 0.3–5)
WBC: 5.7 THOU/UL (ref 4–11)

## 2020-10-19 ASSESSMENT — MIFFLIN-ST. JEOR: SCORE: 1250.24

## 2020-10-19 ASSESSMENT — PATIENT HEALTH QUESTIONNAIRE - PHQ9: SUM OF ALL RESPONSES TO PHQ QUESTIONS 1-9: 2

## 2020-10-20 ENCOUNTER — AMBULATORY - HEALTHEAST (OUTPATIENT)
Dept: SCHEDULING | Facility: CLINIC | Age: 70
End: 2020-10-20

## 2020-10-20 DIAGNOSIS — Z78.0 MENOPAUSE: ICD-10-CM

## 2020-10-20 LAB
25(OH)D3 SERPL-MCNC: 39.7 NG/ML (ref 30–80)
25(OH)D3 SERPL-MCNC: 39.7 NG/ML (ref 30–80)

## 2020-10-21 ENCOUNTER — COMMUNICATION - HEALTHEAST (OUTPATIENT)
Dept: FAMILY MEDICINE | Facility: CLINIC | Age: 70
End: 2020-10-21

## 2020-11-02 ENCOUNTER — RECORDS - HEALTHEAST (OUTPATIENT)
Dept: VASCULAR ULTRASOUND | Facility: CLINIC | Age: 70
End: 2020-11-02

## 2020-11-02 DIAGNOSIS — Z13.6 ENCOUNTER FOR SCREENING FOR CARDIOVASCULAR DISORDERS: ICD-10-CM

## 2020-11-02 DIAGNOSIS — I70.0 ATHEROSCLEROSIS OF AORTA (H): ICD-10-CM

## 2021-01-05 ENCOUNTER — COMMUNICATION - HEALTHEAST (OUTPATIENT)
Dept: FAMILY MEDICINE | Facility: CLINIC | Age: 71
End: 2021-01-05

## 2021-01-05 DIAGNOSIS — M62.830 BACK MUSCLE SPASM: ICD-10-CM

## 2021-01-05 DIAGNOSIS — K21.00 GASTROESOPHAGEAL REFLUX DISEASE WITH ESOPHAGITIS WITHOUT HEMORRHAGE: ICD-10-CM

## 2021-01-05 DIAGNOSIS — R45.86 MOOD CHANGES: ICD-10-CM

## 2021-01-06 RX ORDER — OMEPRAZOLE 40 MG/1
CAPSULE, DELAYED RELEASE ORAL
Qty: 90 CAPSULE | Refills: 3 | Status: SHIPPED | OUTPATIENT
Start: 2021-01-06 | End: 2021-10-20

## 2021-01-06 RX ORDER — CYCLOBENZAPRINE HCL 10 MG
10 TABLET ORAL 3 TIMES DAILY PRN
Qty: 30 TABLET | Refills: 1 | Status: SHIPPED | OUTPATIENT
Start: 2021-01-06 | End: 2022-04-16

## 2021-03-01 ENCOUNTER — COMMUNICATION - HEALTHEAST (OUTPATIENT)
Dept: FAMILY MEDICINE | Facility: CLINIC | Age: 71
End: 2021-03-01

## 2021-04-16 ENCOUNTER — HOSPITAL ENCOUNTER (OUTPATIENT)
Dept: MAMMOGRAPHY | Facility: CLINIC | Age: 71
Discharge: HOME | End: 2021-04-16
Attending: FAMILY MEDICINE

## 2021-04-16 DIAGNOSIS — Z12.31 VISIT FOR SCREENING MAMMOGRAM: ICD-10-CM

## 2021-05-10 ENCOUNTER — RECORDS - HEALTHEAST (OUTPATIENT)
Dept: FAMILY MEDICINE | Facility: CLINIC | Age: 71
End: 2021-05-10

## 2021-05-10 ENCOUNTER — OFFICE VISIT - HEALTHEAST (OUTPATIENT)
Dept: FAMILY MEDICINE | Facility: CLINIC | Age: 71
End: 2021-05-10

## 2021-05-10 DIAGNOSIS — F32.A DEPRESSION, UNSPECIFIED DEPRESSION TYPE: ICD-10-CM

## 2021-05-10 RX ORDER — FLUOXETINE 10 MG/1
10 CAPSULE ORAL DAILY
Qty: 90 CAPSULE | Refills: 3 | Status: SHIPPED | OUTPATIENT
Start: 2021-05-10 | End: 2022-02-18

## 2021-05-25 ENCOUNTER — RECORDS - HEALTHEAST (OUTPATIENT)
Dept: ADMINISTRATIVE | Facility: CLINIC | Age: 71
End: 2021-05-25

## 2021-05-26 ENCOUNTER — RECORDS - HEALTHEAST (OUTPATIENT)
Dept: ADMINISTRATIVE | Facility: CLINIC | Age: 71
End: 2021-05-26

## 2021-05-27 ENCOUNTER — RECORDS - HEALTHEAST (OUTPATIENT)
Dept: ADMINISTRATIVE | Facility: CLINIC | Age: 71
End: 2021-05-27

## 2021-05-27 ASSESSMENT — PATIENT HEALTH QUESTIONNAIRE - PHQ9: SUM OF ALL RESPONSES TO PHQ QUESTIONS 1-9: 2

## 2021-05-28 ENCOUNTER — RECORDS - HEALTHEAST (OUTPATIENT)
Dept: ADMINISTRATIVE | Facility: CLINIC | Age: 71
End: 2021-05-28

## 2021-05-28 NOTE — PROGRESS NOTES
Assessment and Plan:     1. Routine general medical examination at a health care facility  CANCELED: Gynecologic Cytology (PAP Smear)   2. GERD (gastroesophageal reflux disease)  omeprazole (PRILOSEC) 20 MG capsule   3. Mood changes  FLUoxetine (PROZAC) 20 MG capsule   4. Chronic cough     5. Pulmonary nodules     6. Hyperlipidemia, unspecified hyperlipidemia type  Comprehensive Metabolic Panel    Lipid Cascade    HM2(CBC w/o Differential)   7. 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 Vitamin D deficiency  Vitamin D, Total (25-Hydroxy)   9. Diastolic dysfunction     10. Visit for screening mammogram  Mammo Screening Bilateral   11. Back muscle spasm  cyclobenzaprine (FLEXERIL) 10 MG tablet   12. Abdominal pain, left upper quadrant  US Abdomen Complete    US Abdomen Complete   13. Abdominal pain, right upper quadrant  US Abdomen Complete    US Abdomen Complete   14. Fatigue, unspecified type  Thyroid Forrest   15. Screening for cervical cancer  Gynecologic Cytology (PAP Smear)    HPV High Risk DNA Cervical       The patient's current medical problems were reviewed.    Per recent guidelines, you can stop your 81 mg baby Aspirin.     Seeing pulmonology.  Using Breo Ellipta inhaler per pulmonology and will let them know if it is helpful for you jaquelin noriega.    Will be following up with ENT this fall.    Seeing dermatology yearly for full body skin check.    Requesting all notes and pathology reports from [facility].    Recommend 3 dairy servings a day or calcium with vitamin D twice a day with food.    If you are interested in the new shingles shot, Shingrix, please check with insurance for coverage either in clinic or at a pharmacy. It is a 2 shot series 2-6 months apart.     Bone density due April, 2020.    Colonoscopy due in 2022.    Per pulmonology, another chest CT in April, 2020.  When doing a follow up chest CT, ask to monitor the mild dilation of the ascending thoracic aorta.     Please check with  insurance to see if a heart ultrasound or echo is covered for diastolic disfunction.    Abdominal ultrasound ordered today for upper abdominal pain.   If that is normal and symptoms persist, we will consider an abdominal pelvis CT, HIDA scan and endoscopy.    For the vulvar itching, miconizole 2% cream is available over the counter. Use twice a day for 1 week.      Please set a fasting lab appointment.     I have had an Advance Directives discussion with the patient.  The following health maintenance schedule was reviewed with the patient and provided in printed form in the after visit summary:   Health Maintenance   Topic Date Due     ZOSTER VACCINES (2 of 3) If you are interested in the new shingles shot, Shingrix, please check with insurance for coverage either in clinic or at a pharmacy. It is a 2 shot series 2-6 months apart.      FALL RISK ASSESSMENT  Steady     INFLUENZA VACCINE RULE BASED (Season Ended) 08/01/2019     MAMMOGRAM  Ordered     DXA SCAN  04/30/2020     COLONOSCOPY  03/29/2022     ADVANCE DIRECTIVES DISCUSSED WITH PATIENT  We would like a copy please     TD 18+ HE  05/08/2028     PNEUMOCOCCAL POLYSACCHARIDE VACCINE AGE 65 AND OVER  Completed     PNEUMOCOCCAL CONJUGATE VACCINE FOR ADULTS (PCV13 OR PREVNAR)  Completed      Pap-Today  Subjective:   Chief Complaint: Tayler Mcmahon is an 69 y.o. female here for an Annual Wellness visit.   HPI:    Chronic cough: The patient states she is seeing pulmonology for a chronic cough who thinks she may have asthma. She has tried using steroid inhalers in the past but they did not improve her symptoms. She states that her cough varies in severity, particularly when she has other URI symptoms.     Stress incontinence: The patient states that she has some incontinence when she coughs. She notes that she wears a pad daily and sometimes gets vulvar redness and itching.     Upper abdominal pain: The patient reports that she at times has upper abdominal pain that she  describes as a squeezing pain. She states that she has it across her entire upper abdomen.     Muscle spasms: The patient states that she gets muscle spasms after being active, such as yard work. She states that she takes Flexeril as needed, uses ice, and sees a chiropractor which all help her symptoms.     Review of Systems:    Positive for: Cough, upper abdominal pain, fatigue, muscle spasms, stress incontinence, vulvar itching and redness  Denies: Vaginal bleeding  The rest of the review of systems are negative for all systems.    PMFSH  Family history of cancer that metastasized to the colon.  Hysteroscopy 5 years ago  Never smoker     Patient Care Team:  Rissa Corral MD as PCP - General (Family Medicine)  Kaushik Belcher MD as Physician (Pulmonary Disease)     Patient Active Problem List   Diagnosis     Chronic Reflux Esophagitis     Subacromial Bursitis On The Left     Furuncle     Ptosis Of Eyelid     Prediabetes     Plantar Fasciitis     Adult Sleep Apnea     Osteopenia     Pelvic Pain     Imaging Studies Nonspecific Abnormal Findings Genitourinary     Diastolic dysfunction     Vitamin D deficiency     Hyperlipidemia     PAC (premature atrial contraction)     Chronic cough     Pulmonary nodules     Past Medical History:   Diagnosis Date     Appendicitis 11/17/2016     Depression      Diverticulosis      Fibrocystic breast      HLD (hyperlipidemia)      Inverted nipple      PONV (postoperative nausea and vomiting)       Past Surgical History:   Procedure Laterality Date     BELPHAROPTOSIS REPAIR Left      HYSTEROSCOPY      Recorded: 08/20/2008;  Comments: With polyp removed, told benign     KNEE ARTHROSCOPY Left 10/23/2006    Dr. Calvo at [facility]     LAPAROSCOPIC APPENDECTOMY N/A 11/17/2016    Surgeon: Italo Fontaine MD;  Location: [facility] [place];  Service:      REDUCTION MAMMAPLASTY  1982      Family History   Problem Relation Age of Onset     Hypertension Mother      Transient ischemic  attack Mother      Depression Mother      Polymyalgia rheumatica Mother      Lung cancer Mother 84     Diabetes type II Mother      Cancer Mother      Cancer Father 56        Tonsil     Colon cancer Father 57     Breast cancer Maternal Grandmother         mastectomy      Social History     Socioeconomic History     Marital status:      Spouse name: Not on file     Number of children: Not on file     Years of education: Not on file     Highest education level: Not on file   Occupational History     Not on file   Social Needs     Financial resource strain: Not on file     Food insecurity:     Worry: Not on file     Inability: Not on file     Transportation needs:     Medical: Not on file     Non-medical: Not on file   Tobacco Use     Smoking status: Never Smoker     Smokeless tobacco: Never Used   Substance and Sexual Activity     Alcohol use: No     Drug use: No     Sexual activity: Never   Lifestyle     Physical activity:     Days per week: Not on file     Minutes per session: Not on file     Stress: Not on file   Relationships     Social connections:     Talks on phone: Not on file     Gets together: Not on file     Attends Orthodoxy service: Not on file     Active member of club or organization: Not on file     Attends meetings of clubs or organizations: Not on file     Relationship status: Not on file     Intimate partner violence:     Fear of current or ex partner: Not on file     Emotionally abused: Not on file     Physically abused: Not on file     Forced sexual activity: Not on file   Other Topics Concern     Not on file   Social History Narrative     Not on file      Current Outpatient Medications   Medication Sig Dispense Refill     acetaminophen (TYLENOL) 500 MG tablet Take 500 mg by mouth every 6 (six) hours as needed for pain.       calcium-vitamin D (CALCIUM-VITAMIN D) 500 mg(1,250mg) -200 unit per tablet Take 1 tablet by mouth daily.        cholecalciferol, vitamin D3, (VITAMIN D3) 2,000 unit  "cap Take 2,000 Units by mouth daily.       FLUoxetine (PROZAC) 20 MG capsule Take 1 capsule (20 mg total) by mouth daily. 90 capsule 3     fluticasone/vilanterol (BREO ELLIPTA INHL) Inhale.       ibuprofen (ADVIL,MOTRIN) 200 MG tablet Take 200-400 mg by mouth every 6 (six) hours as needed for pain.       magnesium oxide 500 mg Tab Take 500 mg by mouth daily.       omeprazole (PRILOSEC) 20 MG capsule Take 1 capsule (20 mg total) by mouth daily before breakfast. 90 capsule 3     cyclobenzaprine (FLEXERIL) 10 MG tablet Take 1 tablet (10 mg total) by mouth 3 (three) times a day as needed for muscle spasms. 30 tablet 1     No current facility-administered medications for this visit.       Objective:   Vital Signs:   Visit Vitals  /77 (Patient Site: Left Arm, Patient Position: Sitting, Cuff Size: Adult Regular)   Pulse 61   Temp 97.1  F (36.2  C) (Oral)   Resp 16   Ht 5' 2\" (1.575 m)   Wt 169 lb 8 oz (76.9 kg)   LMP 06/16/2000   BMI 31.00 kg/m         VisionScreening:  No exam data present     PHYSICAL EXAM  /77 (Patient Site: Left Arm, Patient Position: Sitting, Cuff Size: Adult Regular)   Pulse 61   Temp 97.1  F (36.2  C) (Oral)   Resp 16   Ht 5' 2\" (1.575 m)   Wt 169 lb 8 oz (76.9 kg)   LMP 06/16/2000   BMI 31.00 kg/m      General Appearance:    Alert, cooperative, no distress, appears stated age   Head:    Normocephalic, without obvious abnormality, atraumatic   Eyes:    PERRL, conjunctiva/corneas clear, EOM's intact, fundi     benign, both eyes, hearing aids in both ears   Ears:    Normal TM's and external ear canals, both ears   Nose:   Nares normal, septum midline, mucosa normal, no drainage     or sinus tenderness   Throat:   Lips, mucosa, and tongue normal; teeth and gums normal   Neck:   Supple, symmetrical, trachea midline, no adenopathy;     thyroid:  no enlargement/tenderness/nodules; no carotid    bruit or JVD   Back:     Symmetric, no curvature, ROM normal, no CVA tenderness   Lungs:     " Clear to auscultation bilaterally, respirations unlabored   Chest Wall:    No tenderness or deformity    Heart:    Regular rate and rhythm, S1 and S2 normal, no murmur, rub    or gallop   Breast Exam:    No tenderness, masses, or nipple abnormality   Abdomen:     Soft, non-tender, bowel sounds active all four quadrants,     no masses, no organomegaly   Genitalia:    Unable to palpate uterus and ovaries secondary to body habitus       Extremities:   Extremities normal, atraumatic, no cyanosis or edema   Pulses:   2+ and symmetric all extremities   Skin:   Skin color, texture, turgor normal, no rashes or lesions   Lymph nodes:   Cervical, supraclavicular, and axillary nodes normal   Neurologic:   CNII-XII intact, normal strength, sensation and reflexes     throughout       Assessment Results 5/2/2019   Activities of Daily Living No help needed   Instrumental Activities of Daily Living No help needed   Get Up and Go Score -   Mini Cog Total Score 4   Some recent data might be hidden     A Mini-Cog score of 0-2 suggests the possibility of dementia, score of 3-5 suggests no dementia    Identified Health Risks:     The patient was counseled and encouraged to consider modifying their diet and eating habits. She was provided with information on recommended healthy diet options.  The patient was provided with written information regarding signs of hearing loss.  Patient's advanced directive was discussed and I am comfortable with the patient's wishes.    ADDITIONAL HISTORY SUMMARIZED (2): 2/2019 Pulmonology note reviewed regarding pulmonary nodules.   DECISION TO OBTAIN EXTRA INFORMATION (1): Request for notes and pathology reports from [facility]  RADIOLOGY TESTS (1): 11/17/2016 CT abdomen pelvis reviewed showing appendicitis  2/2019 Chest CT reviewed showing pulmonary nodules.   10/16/14 Heart echo reviewed showing abnormal diastolic relaxation  4/30/18 Bone density scan reviewed: low-moderate bone loss  LABS (1): Labs  ordered today  MEDICINE TESTS (1): 2/2019 Pulmonary function test reviewed as normal.   11/5/14 Nuclear stress test reviewed as normal  3/29/17 Colonoscopy note reviewed showing 6 mm hyperplastic polyp and mild diverticulosis  INDEPENDENT REVIEW (2 each): None.     Total data points: 6    The visit lasted a total of 55 minutes face to face with the patient. Over 50% of the time was spent counseling and educating the patient about chronic cough and upper abdominal pain.    I, Viridiana Saleh, am scribing for and in the presence of, Dr. Corral at  5/2/19 . 5:39pm    I, Dr. Corral, personally performed the services described in this documentation, as scribed by Viridiana Saleh in my presence, and it is both accurate and complete.

## 2021-05-29 ENCOUNTER — RECORDS - HEALTHEAST (OUTPATIENT)
Dept: ADMINISTRATIVE | Facility: CLINIC | Age: 71
End: 2021-05-29

## 2021-05-30 ENCOUNTER — RECORDS - HEALTHEAST (OUTPATIENT)
Dept: ADMINISTRATIVE | Facility: CLINIC | Age: 71
End: 2021-05-30

## 2021-05-30 VITALS — HEIGHT: 62 IN | WEIGHT: 167 LBS | BODY MASS INDEX: 30.73 KG/M2

## 2021-05-30 NOTE — TELEPHONE ENCOUNTER
CA: Could you contact radiology and ask them the degree or severity of the fatty liver?    Please let the patient know this has been requested.  Also,liver function done in May with blood work is normal.  Recommend healthy low fat diet and exercise.   We could repeat an abdominal ultrasound in 1-2 years, sooner if concerns. We can do liver function blood work yearly.

## 2021-05-30 NOTE — TELEPHONE ENCOUNTER
I spoke to Dr. Lam from [facility]. He states there is mild to moderate amount of fatty infiltration shown.

## 2021-05-31 VITALS — BODY MASS INDEX: 31.55 KG/M2 | WEIGHT: 172.5 LBS

## 2021-05-31 VITALS — BODY MASS INDEX: 30.91 KG/M2 | WEIGHT: 169 LBS

## 2021-05-31 NOTE — TELEPHONE ENCOUNTER
Copied from phone encounter 07/26/19, JOSE Mathew    I spoke to Dr. Lam from [facility]. He states there is mild to moderate amount of fatty infiltration shown.

## 2021-06-01 ENCOUNTER — RECORDS - HEALTHEAST (OUTPATIENT)
Dept: ADMINISTRATIVE | Facility: CLINIC | Age: 71
End: 2021-06-01

## 2021-06-01 VITALS — BODY MASS INDEX: 31.47 KG/M2 | WEIGHT: 171 LBS | HEIGHT: 62 IN

## 2021-06-03 VITALS — BODY MASS INDEX: 31.19 KG/M2 | HEIGHT: 62 IN | WEIGHT: 169.5 LBS

## 2021-06-04 VITALS
RESPIRATION RATE: 20 BRPM | WEIGHT: 174.4 LBS | BODY MASS INDEX: 31.9 KG/M2 | TEMPERATURE: 99.3 F | SYSTOLIC BLOOD PRESSURE: 152 MMHG | DIASTOLIC BLOOD PRESSURE: 82 MMHG | HEART RATE: 70 BPM | OXYGEN SATURATION: 93 %

## 2021-06-04 VITALS
TEMPERATURE: 98 F | SYSTOLIC BLOOD PRESSURE: 149 MMHG | HEART RATE: 64 BPM | RESPIRATION RATE: 16 BRPM | DIASTOLIC BLOOD PRESSURE: 87 MMHG | BODY MASS INDEX: 31.22 KG/M2 | WEIGHT: 170.7 LBS | OXYGEN SATURATION: 96 %

## 2021-06-04 VITALS — HEIGHT: 62 IN | WEIGHT: 170.7 LBS | BODY MASS INDEX: 31.41 KG/M2

## 2021-06-05 ENCOUNTER — RECORDS - HEALTHEAST (OUTPATIENT)
Dept: FAMILY MEDICINE | Facility: CLINIC | Age: 71
End: 2021-06-05

## 2021-06-05 ENCOUNTER — RECORDS - HEALTHEAST (OUTPATIENT)
Dept: LAB | Facility: CLINIC | Age: 71
End: 2021-06-05

## 2021-06-05 ENCOUNTER — RECORDS - HEALTHEAST (OUTPATIENT)
Dept: SCHEDULING | Facility: CLINIC | Age: 71
End: 2021-06-05

## 2021-06-05 VITALS
WEIGHT: 173.5 LBS | SYSTOLIC BLOOD PRESSURE: 144 MMHG | HEART RATE: 60 BPM | RESPIRATION RATE: 16 BRPM | TEMPERATURE: 98.1 F | HEIGHT: 62 IN | BODY MASS INDEX: 31.93 KG/M2 | DIASTOLIC BLOOD PRESSURE: 76 MMHG

## 2021-06-05 DIAGNOSIS — R60.9 EDEMA: ICD-10-CM

## 2021-06-05 DIAGNOSIS — I51.9 HEART DISEASE: ICD-10-CM

## 2021-06-05 DIAGNOSIS — R52 GENERALIZED PAIN: ICD-10-CM

## 2021-06-05 DIAGNOSIS — N94.9 SYMPTOM ASSOCIATED WITH FEMALE GENITAL ORGANS: ICD-10-CM

## 2021-06-06 NOTE — PROGRESS NOTES
ASSESSMENT & PLAN:  1. Chronic cough, post nasal drainage  She states she saw an ENT physician who recommended a trial of Flonase.  Patient thinks this helped with postnasal drainage and chronic cough but she was left with a very dry nose.  She is willing to continue.  She is using Vaseline to help with dry nose.  - fluticasone propionate (FLONASE) 50 mcg/actuation nasal spray; Apply 1 spray into each nostril daily.  Dispense: 16 g; Refill: 1    2.  Acute bronchitis  Chest x-ray performed, personally reviewed, appears negative for acute infiltrate by my review.  Compared to 2016, appears unchanged.  Radiology review is pending.  Is very concerned about upcoming travel.  In the past, prednisone burst has helped her with chest tightness and cough.  She cannot afford a steroid inhaler and so has not continued on this.  She declines prescription for albuterol, concern regarding cost.  Empiric antibiotic and prednisone burst as below.  Follow-up if worsening or not improving.  - XR Chest 2 Views; Future  - azithromycin (ZITHROMAX) 250 MG tablet; 2 tablets (500 mg) on day 1, then 1 tablet daily (250 mg) daily on days 2 through 5  Dispense: 6 tablet; Refill: 0  - predniSONE (DELTASONE) 20 MG tablet; Take 40 mg by mouth daily for 5 days.  Dispense: 10 tablet; Refill: 0      There are no Patient Instructions on file for this visit.    Orders Placed This Encounter   Procedures     XR Chest 2 Views     Standing Status:   Future     Number of Occurrences:   1     Standing Expiration Date:   2/18/2021     Order Specific Question:   Reason for Exam (Describe Symptoms):     Answer:   Cough     Order Specific Question:   Can the procedure be changed per Radiologist protocol?     Answer:   Yes     Medications Discontinued During This Encounter   Medication Reason     fluticasone propionate (FLONASE) 50 mcg/actuation nasal spray Reorder       Return in about 3 months (around 5/18/2020) for Annual physical.    CHIEF COMPLAINT:  Chief  Complaint   Patient presents with     URI     c/o cough X1 week, tightness in chest, intermittent productive cough with white sputum, nasal congestion, intermittent headache, exposure to pneumonia        HISTORY OF PRESENT ILLNESS:  Tayler is a 70 y.o. female presenting to the clinic today for cough and chest tightness for 1 week.  Cough occasionally productive of white sputum.  She has nasal congestion, rhinorrhea, occasional headache.  Low-grade temperature today.  Has been exposed to several illnesses lately including pneumonia.  No shortness of breath.  No body aches.  She follows with a pulmonologist for obstructive sleep apnea as well as pulmonary nodules.  Non-smoker.  She was given samples of Brio Ellipta at one point from her pulmonologist.  She states she tried the medication but it did not make any difference.  She believes this was for chronic cough.  She thinks her pulmonologist thought there might be a component of asthma.  She does not believe she has used albuterol in the past.  No wheezing currently.  She states she has been given a prednisone burst in the past, was helpful with cough chest tightness and she tolerated it without issues.    She leaves in less than a week for [place].    REVIEW OF SYSTEMS:   All other systems are negative.    PFSH:  Patient Active Problem List   Diagnosis     Chronic Reflux Esophagitis     Subacromial Bursitis On The Left     Furuncle     Ptosis Of Eyelid     Prediabetes     Plantar Fasciitis     Adult Sleep Apnea     Osteopenia     Pelvic Pain     Imaging Studies Nonspecific Abnormal Findings Genitourinary     Diastolic dysfunction     Vitamin D deficiency     Hyperlipidemia     PAC (premature atrial contraction)     Chronic cough     Pulmonary nodules        TOBACCO USE:  Social History     Tobacco Use   Smoking Status Never Smoker   Smokeless Tobacco Never Used       VITALS:  Vitals:    02/18/20 1258 02/18/20 1313   BP: 143/77 152/82   Patient Site: Left Arm Left Arm    Patient Position: Sitting Sitting   Cuff Size: Adult Regular Adult Regular   Pulse: 75 70   Resp: 20    Temp: 99.3  F (37.4  C)    TempSrc: Oral    SpO2: 93%    Weight: 174 lb 6.4 oz (79.1 kg)      Wt Readings from Last 3 Encounters:   02/18/20 174 lb 6.4 oz (79.1 kg)   05/02/19 169 lb 8 oz (76.9 kg)   04/09/18 171 lb (77.6 kg)     Body mass index is 31.9 kg/m .    PHYSICAL EXAM:  GENERAL: Pleasant, well-appearing patient in no acute distress.   HEENT: Pupils equal round reactive to light. Sclerae and conjunctivae clear. Oropharynx is clear with moist mucous membranes. TMs clear.   NECK: Supple without lymphadenopathy  CARDIOVASCULAR: Heart regular rate and rhythm without murmur normal S1-S2   LUNGS: Clear to auscultation bilaterally, good air movement throughout   EXTREMITIES Warm and well-perfused without edema.   NEURO: Alert and oriented. Grossly nonfocal.   PSYCHIATRIC: Presents on time and well groomed. Normal speech and thought content. Full affect. No abnormal movements or behaviors noted.      MEDICATIONS:  Current Outpatient Medications   Medication Sig Dispense Refill     acetaminophen (TYLENOL) 500 MG tablet Take 500 mg by mouth every 6 (six) hours as needed for pain.       calcium-vitamin D (CALCIUM-VITAMIN D) 500 mg(1,250mg) -200 unit per tablet Take 1 tablet by mouth daily.        cholecalciferol, vitamin D3, (VITAMIN D3) 2,000 unit cap Take 2,000 Units by mouth daily.       FLUoxetine (PROZAC) 20 MG capsule Take 1 capsule (20 mg total) by mouth daily. 90 capsule 3     fluticasone propionate (FLONASE) 50 mcg/actuation nasal spray Apply 1 spray into each nostril daily. 16 g 1     fluticasone/vilanterol (BREO ELLIPTA INHL) Inhale.       ibuprofen (ADVIL,MOTRIN) 200 MG tablet Take 200-400 mg by mouth every 6 (six) hours as needed for pain.       magnesium oxide 500 mg Tab Take 500 mg by mouth daily.       omeprazole (PRILOSEC) 20 MG capsule Take 1 capsule (20 mg total) by mouth daily before breakfast.  90 capsule 3     azithromycin (ZITHROMAX) 250 MG tablet 2 tablets (500 mg) on day 1, then 1 tablet daily (250 mg) daily on days 2 through 5 6 tablet 0     cyclobenzaprine (FLEXERIL) 10 MG tablet Take 1 tablet (10 mg total) by mouth 3 (three) times a day as needed for muscle spasms. 30 tablet 1     predniSONE (DELTASONE) 20 MG tablet Take 40 mg by mouth daily for 5 days. 10 tablet 0     No current facility-administered medications for this visit.

## 2021-06-06 NOTE — TELEPHONE ENCOUNTER
"Pt had OV 2/18/2020 for acute bronchitis.  Rec'd Z-Pack and prednisone.    Pt then had travel to [facility].  Now reports five days of sinus pain and pressure.  Coughing up \"yellow sputum.\"  Possible fever -> \"one time felt chilly.\"  Has been taking Tylenol and Sudafed.  Pt's voice currently exhibits significant congestion.    Pt agrees to clinical eval today.  No open appt slots at her primary clinic.  Intends to go to [facility] walk-in-clinic.    Rabia Herring RN  [facility] Triage     Reason for Disposition    Lots of coughing    Protocols used: SINUS PAIN AND CONGESTION-A-OH      "

## 2021-06-06 NOTE — TELEPHONE ENCOUNTER
Question following Office Visit  When did you see your provider: 2/18/20  What is your question: Patient would like the inhaler Dr. Olson offered.  She still has some wheezing and is leaving the country today.  She has to be at the airport at by 4:30 today.  She would like this ordered as soon as possible.  She would like this called to the [facility] pharmacy in [place].  Please call patient when prescription is sent.  Okay to leave a detailed message: Yes

## 2021-06-06 NOTE — TELEPHONE ENCOUNTER
----- Message from Francisca Olson MD sent at 2/19/2020  7:48 AM CST -----  Please contact patient with results and inform them of recommendations:  Radiologist agrees, no sign of pneumonia. Unrelated to current symptoms, the xray shows that there might be mild increase in the size of the pulmonary arteries, which can happen in a condition called pulmonary hypertension. We can get a better idea of whether that is actually going on by doing an echocardiogram. I have placed order and she can schedule at her convenience. It is not urgent.   Francisca Olson MD  Family Medicine  [facility]

## 2021-06-06 NOTE — PROGRESS NOTES
Assessment/Plan:         Tayler was seen today for sinus pressure.    Diagnoses and all orders for this visit:    Acute non-recurrent maxillary sinusitis: initially rhinorrhea then progressing to sinus headache, pressure, thick nasal discharge. She has history of intermittent sinus infections. Will treat with doxycycline (allergy to amox), continue supportive cares. Discussed concerning symptoms for which to return.  -     doxycycline (VIBRA-TABS) 100 MG tablet; Take 1 tablet (100 mg total) by mouth 2 (two) times a day for 7 days.                Plan of care was discussed with the patient and/or guardian. They verbalize understanding of the treatment options and plan of care.    Comfort Zhong       Subjective:        Tayler Mcmahon is a 70 y.o. female who presents for headache, congestion, fatigue.   Had rhinorrhea for several days during a trip to [facility] after getting salt water in her nose (inhaled when she should have exhaled while snorkeling).  Now since she arrived home, she has had headache, thick nasal congestion, significant fatigue for at least 5 days getting worse and not responding to decongestants, nsaids, time.   Has a history of sinus infections -this feels typical.  No fever, chills, ear pain, nausea, bodyaches.   No sick contacts.    Past medical history, meds, allergies reviewed.   Not a smoker.          Objective:       Blood pressure 149/87, pulse 64, temperature 98  F (36.7  C), temperature source Oral, resp. rate 16, weight 170 lb 11.2 oz (77.4 kg), last menstrual period 06/16/2000, SpO2 96 %, not currently breastfeeding.   Gen: mildly ill appearing, no distress  Card: RRR, no murmur, normal S1/S2. No pedal edema.  Resp: clear to auscultation bilaterally, no wheeze or crackles.   HEENT: Head - normocephalic, tenderness in the maxillary sinuses.  Eyes - normal lids and conjuntivae, EOMs intact   Nose - no deformity, without masses, thick yellow rhinorrhea  Oropharynx - Oral mucosa and pharnyx  normal, moist mucous membranes   Ears: TMs normal bilaterally, normal canals.

## 2021-06-06 NOTE — TELEPHONE ENCOUNTER
----- Message from Francisca Olson MD sent at 3/9/2020  8:26 PM CDT -----  Please contact patient with results and inform them of recommendations:  Echo is normal except for mild thickening of heart muscle. No significant change compared to 2014. The heart muscle can thicken when it is working against high blood pressure. Looking at her chart, BP has been either borderline high or mildly elevated at all of her visits since 2018. I recommend she get a home BP cuff and check it regularly. If running >140/90 she should let us know.   Francisca Olson MD  Family Medicine  [facility]   [id number] Critical Care - 30 to 74 minutes

## 2021-06-06 NOTE — TELEPHONE ENCOUNTER
Called and spoke to patient. Informed her of results form provider. She understands and agrees.   Letter sent.

## 2021-06-08 NOTE — PROGRESS NOTES
"Tayler Mcmahon is a 70 y.o. female who is being evaluated via a billable telephone visit.      The patient has been notified of following:     \"This telephone visit will be conducted via a call between you and your physician/provider. We have found that certain health care needs can be provided without the need for a physical exam.  This service lets us provide the care you need with a short phone conversation.  If a prescription is necessary we can send it directly to your pharmacy.  If lab work is needed we can place an order for that and you can then stop by our lab to have the test done at a later time.    Telephone visits are billed at different rates depending on your insurance coverage. During this emergency period, for some insurers they may be billed the same as an in-person visit.  Please reach out to your insurance provider with any questions.    If during the course of the call the physician/provider feels a telephone visit is not appropriate, you will not be charged for this service.\"    Patient has given verbal consent to a Telephone visit? Yes    What phone number would you like to be contacted at? [phone]    Patient would like to receive their AVS by AVS Preference: Jolene.    Additional provider notes: -  Pleasant 70-year-old who denies a history of UTI or kidney stones is evaluated by telephone visit today for 2-day history of dysuria, urinary frequency and urgency, and hematuria.  Symptoms started yesterday and worsened throughout the day and night.  Denies flank pain.  No fever.  She felt chilled in bed last night but denies chills today.  Denies vaginal discharge or itching.  Had some suprapubic discomfort last night.  Had some loose stools yesterday.  No nausea or vomiting and appetite is fine.  She reports she was pushing a lot of fluids this morning before she came in for her urinalysis.    Exam: Exam limited due to telephone visit with patient sounded alert and oriented by phone.  She " did not sound weak.  She was not confused.  She was able to speak in full sentences without shortness of breath, without wheezing, without stridor.    Assessment/Plan:  1. UTI  She does have red blood cells and white blood cells on UA.  Urine sent for culture.  Symptoms consistent with UTI.  Recommend she start cephalexin while awaiting final culture results.  She was informed that urine culture will return in 1 to 3 days, and there is a possibility it returns negative, or a possibility we may need to change antibiotics based on susceptibility.  She should call back if worsening symptoms, if new fevers or chills, if flank pain.  - cephalexin (KEFLEX) 500 MG capsule; Take 1 capsule (500 mg total) by mouth 2 (two) times a day for 7 days.  Dispense: 14 capsule; Refill: 0        Phone call duration:  8 minutes

## 2021-06-08 NOTE — TELEPHONE ENCOUNTER
Refill Approved    Rx renewed per Medication Renewal Policy. Medication was last renewed on 5/2/19                                                       .    Comfort Joyce, [facility] Triage/Med Refill 6/1/2020     Requested Prescriptions   Pending Prescriptions Disp Refills     omeprazole (PRILOSEC) 20 MG capsule [Pharmacy Med Name: OMEPRAZOLE 20 MG Capsule Delayed Release] 90 capsule 3     Sig: TAKE 1 CAPSULE (20 MG TOTAL) BY MOUTH DAILY BEFORE BREAKFAST.       GI Medications Refill Protocol Passed - 5/27/2020 11:19 AM        Passed - PCP or prescribing provider visit in last 12 or next 3 months.     Last office visit with prescriber/PCP: 9/29/2016 Rissa Corral MD OR same dept: 2/18/2020 Francisca Olson MD OR same specialty: 2/18/2020 Francisca Olson MD  Last physical: 5/2/2019 Last MTM visit: Visit date not found   Next visit within 3 mo: Visit date not found  Next physical within 3 mo: Visit date not found  Prescriber OR PCP: Rissa Corral MD  Last diagnosis associated with med order: 1. GERD (gastroesophageal reflux disease)  - omeprazole (PRILOSEC) 20 MG capsule [Pharmacy Med Name: OMEPRAZOLE 20 MG Capsule Delayed Release]; Take 1 capsule (20 mg total) by mouth daily before breakfast.  Dispense: 90 capsule; Refill: 3    2. Mood changes  - FLUoxetine (PROZAC) 20 MG capsule [Pharmacy Med Name: FLUOXETINE HCL 20 MG Capsule]; Take 1 capsule (20 mg total) by mouth daily.  Dispense: 90 capsule; Refill: 3    If protocol passes may refill for 12 months if within 3 months of last provider visit (or a total of 15 months).                FLUoxetine (PROZAC) 20 MG capsule [Pharmacy Med Name: FLUOXETINE HCL 20 MG Capsule] 90 capsule 3     Sig: TAKE 1 CAPSULE (20 MG TOTAL) BY MOUTH DAILY.       [facility] Refill Protocol  Passed - 5/27/2020 11:19 AM        Passed - PCP or prescribing provider visit in last year     Last office visit with prescriber/PCP: 9/29/2016 Rissa Corral MD OR same dept:  2/18/2020 Francisca Olson MD OR same specialty: 2/18/2020 Francisca Olson MD  Last physical: 5/2/2019 Last MTM visit: Visit date not found   Next visit within 3 mo: Visit date not found  Next physical within 3 mo: Visit date not found  Prescriber OR PCP: Rissa Corral MD  Last diagnosis associated with med order: 1. GERD (gastroesophageal reflux disease)  - omeprazole (PRILOSEC) 20 MG capsule [Pharmacy Med Name: OMEPRAZOLE 20 MG Capsule Delayed Release]; Take 1 capsule (20 mg total) by mouth daily before breakfast.  Dispense: 90 capsule; Refill: 3    2. Mood changes  - FLUoxetine (PROZAC) 20 MG capsule [Pharmacy Med Name: FLUOXETINE HCL 20 MG Capsule]; Take 1 capsule (20 mg total) by mouth daily.  Dispense: 90 capsule; Refill: 3    If protocol passes may refill for 12 months if within 3 months of last provider visit (or a total of 15 months).

## 2021-06-08 NOTE — PROGRESS NOTES
Chief Complaint   Patient presents with     Follow-up     rt arm/elbow stitches          HPI:   Tayler Mcmahon is a 67 y.o. female cut right elbow with a fall 7 days ago.  Sewn up at [facility].  Here for evaluation before goes on a trip to [facility].    Wound is healing well.    ROS:  A 12 point comprehensive review of systems was negative except as noted.     Medications:  Current Outpatient Prescriptions on File Prior to Visit   Medication Sig Dispense Refill     acetaminophen (TYLENOL) 500 MG tablet Take 500 mg by mouth every 6 (six) hours as needed for pain.       aspirin 81 MG EC tablet Take 81 mg by mouth daily.       calcium-vitamin D (CALCIUM-VITAMIN D) 500 mg(1,250mg) -200 unit per tablet Take 1 tablet by mouth daily.        cholecalciferol, vitamin D3, (VITAMIN D3) 2,000 unit cap Take 2,000 Units by mouth daily.       FLUoxetine (PROZAC) 10 MG capsule TAKE TWO CAPSULES BY MOUTH DAILY 180 capsule 3     ibuprofen (ADVIL,MOTRIN) 200 MG tablet Take 200-400 mg by mouth every 6 (six) hours as needed for pain.       lansoprazole (PREVACID) 30 MG capsule Take 30 mg by mouth daily.       magnesium oxide 500 mg Tab Take 500 mg by mouth daily.       senna-docusate (PERICOLACE) 8.6-50 mg tablet Take 1 tablet by mouth 2 (two) times a day.  0     azithromycin (ZITHROMAX) 250 MG tablet 2 pills on day 1, then 1 pill a day for 4 days 6 tablet 1     co-enzyme Q-10 30 mg capsule Take 30 mg by mouth daily.        melatonin 5 mg Tab Take 5 mg by mouth bedtime.        No current facility-administered medications on file prior to visit.          Social History:  Social History   Substance Use Topics     Smoking status: Never Smoker     Smokeless tobacco: Not on file     Alcohol use No         Physical Exam:   Vitals:    02/15/17 1037   BP: 122/68   Patient Site: Right Arm   Patient Position: Sitting   Cuff Size: Adult Regular   Pulse: 64   Resp: 16   Temp: 98.1  F (36.7  C)   TempSrc: Oral   Weight: 167 lb (75.8 kg)   Height:  "5' 2\" (1.575 m)       GEN:  NAD  SKIN:  Healing 2 \" flap type laceration on posterior proximal forearm.  No spreading erythema.  Non tender.        Assessment/Plan:    1. Laceration     2. 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  This was given.  Leave stitches in for at least 10 days.  Given steristrips to apply when stitches are removed.  Her partner is a nurse and is comfortable removing the sutures  Recheck for problems.      The following portions of the patient's history were reviewed and updated as appropriate: allergies, current medications, past family history, past medical history, past social history, past surgical history and problem list.    Beth Santana MD      2/15/2017        "

## 2021-06-08 NOTE — TELEPHONE ENCOUNTER
She can have a lab only appointment for urinalysis but if it is abnormal she will need a virtual visit with me today.  Can double book at 1130. It will likely be 12:00 when I call her.

## 2021-06-08 NOTE — TELEPHONE ENCOUNTER
CCRN: Please call patient and inform her that urine culture has returned positive for infection, but the antibiotic that she is on will not effectively treat it. The bacteria are resistant to multiple antibiotics, including the one she is on. She should stop the cephalexin and start nitrofurantoin instead. Rx sent.

## 2021-06-08 NOTE — TELEPHONE ENCOUNTER
Ok per Dr. Corral to schedule with another provider at clinic.    Patient scheduled for lab appt at 2:45 and scheduled with Dr. Olson at 4pm.

## 2021-06-08 NOTE — TELEPHONE ENCOUNTER
"RN triage call from patient  She is reporting sudden onset late yesterday of urinary urgency with a \"deep burning sensation towards end of emptying bladder\"  Also noticed urine was pink tinged yesterday but has not noticed much of that yet today.  Also reports smaller stream when urinating but reports it is more than a few drops at a time \"but not much\"  Denies back or flank pain  Denies fever but felt chilled yesterday, has used Tylenol.  Did have looser stools yesterday and wonders if she contaminated herself.  Gave disposition for in office visit but patient declined  She is asking for a message to PCP to see if treatment could be started and a lab only visit.    Reviewed home cares, reviewed signs and symptoms of when to call back. Patient stated understanding.  Reviewed pharmacy: [facility].  Please have PCP review and advise thank you.  Ebonie Fierro, RN  [facility] Triage Nurse  8:24 AM  5/14/2020        Reason for Disposition    Age > 50 years    Protocols used: URINATION PAIN - FEMALE-A-OH      "

## 2021-06-08 NOTE — TELEPHONE ENCOUNTER
RN Triage:     Patient made aware of the message and will go to store to get her new antibiotic.     Parvin Francisco RN, BSN [facility] Triage Nurse

## 2021-06-09 NOTE — TELEPHONE ENCOUNTER
Patient will come in today to drop off urine sample. Please place orders for ua/uc for pt. Patient states she would like to leave a sample today with lab. Patient placed on labs schedule today.

## 2021-06-10 NOTE — TELEPHONE ENCOUNTER
New Appointment Needed  What is the reason for the visit:    Lab test for urinalysis  Provider Preference: [facility] lab  How soon do you need to be seen?: today  Waitlist offered?: No  Okay to leave a detailed message:  Yes    Patient has been trying to schedule this lab only appointment for one hour.  She would like assistance from Dr. Corral's team to call and set up her appointment.  Patient did not want to be transferred back to main scheduling line.

## 2021-06-11 NOTE — PROGRESS NOTES
Assessment & Plan:  1. Bronchitis  predniSONE (DELTASONE) 10 mg tablet    doxycycline (MONODOX) 100 MG capsule    codeine-guaiFENesin (GUAIFENESIN AC)  mg/5 mL liquid           Patient Instructions   Follow up with us if symptoms are not improving after the course of antibiotics is complete.    Take antibiotics with food.    Use Mucinex 12 hour twice daily and loosen mucus. Increase water intake.    Saline spray in both nostrils for moisture and to thin mucus.    Use a humidifier at home to moisten the air.        No orders of the defined types were placed in this encounter.    Medications Discontinued During This Encounter   Medication Reason     melatonin 5 mg Tab Therapy completed     co-enzyme Q-10 30 mg capsule Therapy completed     FLUoxetine (PROZAC) 10 MG capsule Dose adjustment           Chief Complaint:   Chief Complaint   Patient presents with     URI     c/o cold symptoms X2 weeks, headaches, coughing, nasal congestion, tightness in chest with deep breath, fatigue, has been using OTC medications        History of Present Illness:  Tayler is a 67 y.o. female presenting to the clinic today with 2 weeks of cold symptoms.  Over the past 1 week symptoms have been worsening.  Patient complains of increasing nasal congestion, sinus pressure and headache as well as tightness in her chest with a deep breath.  She does feels she has some wheezing occasionally and shortness of breath with exertion.  She feels her level of fatigue is increased from usual.  Yesterday she had very thick sinus drainage and worsening sinus pressure throughout the day.  Cough is mostly dry but she will occasionally produce thick mucus.  She has been using Sudafed, over-the-counter cold medicine and Claritin.  She has had occasional chills but no fever that she knows of.    Review of Systems:  All other systems are negative except as noted above.     PFSH:  Reviewed and updated.     Tobacco Use:  History   Smoking Status     Never  Smoker   Smokeless Tobacco     Not on file       Vitals:  Vitals:    06/12/17 0908   BP: 112/62   Patient Site: Right Arm   Patient Position: Sitting   Cuff Size: Adult Regular   Pulse: 74   Resp: 16   Temp: 99.6  F (37.6  C)   TempSrc: Oral   SpO2: 94%   Weight: 169 lb (76.7 kg)     Wt Readings from Last 3 Encounters:   06/12/17 169 lb (76.7 kg)   02/15/17 167 lb (75.8 kg)   11/17/16 157 lb 14.4 oz (71.6 kg)       Physical Exam:  Constitutional:  Reveals an alert, cooperative, 67-year-old female in no acute distress.  Vitals:  Per nursing notes.  Eyes: PERRLA, funduscopic exam within normal limits.  HENT: TMs clear bilaterally,Oropharynx with erythema, clear posterior nasal drainage, no thrush. Neck supple,  thyroid not palpable. Nasal mucosa erythematous, inflamed with increased rhinorrhea. Sinuses tender to palpation.   Cardiovascular:  Regular rate and rhythm without murmurs, rubs, or gallops. Carotids without bruits. Legs without edema.   Respiratory: Clear.  Respiratory effort normal.  Lymph: Anterior cervical lymphadenopathy  present, Posterior cervical lymphadenopathy not present, lymph nodes slightly tender to palpation.   Psychiatric:  Mood appropriate, alert and oriented x3.    Data Reviewed:  Additional History from Old Records or Another Person Summarized (2 total): None.     Decision to Obtain Extra information (1 total): None.     Radiology Tests Summarized and Ordered (XRAY/CT/MRI/DXA) (1 total): None.    Labs Reviewed and Ordered (1 total):none    Medicine Tests Summarized and Ordered (EKG/ECHO/COLONOSCOPY/EGD) (1 total): None.    Independent Review of EKG or X-Ray (2 each): None.    The visit lasted a total of 20 minutes face to face with the patient. Over 50% of the time was spent counseling and educating the patient about plan of care.    Medications:  Current Outpatient Prescriptions   Medication Sig Dispense Refill     acetaminophen (TYLENOL) 500 MG tablet Take 500 mg by mouth every 6 (six)  hours as needed for pain.       aspirin 81 MG EC tablet Take 81 mg by mouth daily.       calcium-vitamin D (CALCIUM-VITAMIN D) 500 mg(1,250mg) -200 unit per tablet Take 1 tablet by mouth daily.        cholecalciferol, vitamin D3, (VITAMIN D3) 2,000 unit cap Take 2,000 Units by mouth daily.       FLUoxetine (PROZAC) 20 MG capsule Take 1 capsule (20 mg total) by mouth daily. 90 capsule 0     ibuprofen (ADVIL,MOTRIN) 200 MG tablet Take 200-400 mg by mouth every 6 (six) hours as needed for pain.       magnesium oxide 500 mg Tab Take 500 mg by mouth daily.       omeprazole (PRILOSEC) 20 MG capsule Take 1 capsule (20 mg total) by mouth Daily before breakfast. 30 capsule 3     senna-docusate (PERICOLACE) 8.6-50 mg tablet Take 1 tablet by mouth 2 (two) times a day.  0     codeine-guaiFENesin (GUAIFENESIN AC)  mg/5 mL liquid Take 5 mL by mouth 3 (three) times a day as needed for cough. 120 mL 0     doxycycline (MONODOX) 100 MG capsule Take 1 capsule (100 mg total) by mouth 2 (two) times a day for 7 days. 14 capsule 0     predniSONE (DELTASONE) 10 mg tablet Take 2 tablets (20 mg total) by mouth daily for 5 days. 10 tablet 0     No current facility-administered medications for this visit.        Total Data Points: 0    ODALYS Grimaldo, CNP    This note has been dictated using voice recognition software. Any grammatical or context distortions are unintentional and inherent to the software

## 2021-06-12 NOTE — PROGRESS NOTES
Assessment and Plan:     1. Routine general medical examination at a health care facility     2. Encounter for screening for cardiovascular disorders  US Abdominal Aorta   3. Visit for screening mammogram  Mammo Screening Bilateral   4. Menopause  DXA Bone Density Scan   5. Hyperlipidemia, unspecified hyperlipidemia type  Lipid [place] FASTING    Comprehensive Metabolic Panel    HM2(CBC w/o Differential)   6. Weight gain  Thyroid Cascade   7. Vitamin D deficiency  Vitamin D, Total (25-Hydroxy)   8. Chronic cough     9. Gastroesophageal reflux disease with esophagitis without hemorrhage       Abdominal ultrasound ordered to screen for aneurysm.    Can check if insurance covers a hepatitis C and HIV screen and we are happy to draw those.    Recommend 3 dairy servings a day or calcium with vitamin D twice a day with food.    Per ENT they have had to increase her omeprazole to 40 mg for total of 3 months then back down to 20 mg.  If the cough persists on the 40 mg they wanted to see you to work with a speech pathologist in case this is a habitual cough.    To see ENT in 2 years otherwise.    Sees pulmonary for sleep apnea and pulmonary nodule.    Chest CT next March per pulmonary.    If you feel you have signs of postnasal drip you could use Flonase 2 sprays each nostril daily.  Then you could see if that helps with the cough.    Try to decrease coffee to 1 cup a day.    Continue to check your blood pressure at home and if over 140 on top or over 90 on the bottom please let me know.  Patient has been advised of split billing requirements and indicates understanding: Yes         The patient's current medical problems were reviewed.    I have had an Advance Directives discussion with the patient.  The following health maintenance schedule was reviewed with the patient and provided in printed form in the after visit summary:   Health Maintenance   Topic Date Due     HEPATITIS C SCREENING  To check with insurance     DXA  SCAN  04/30/2020, ordered     MAMMOGRAM  07/22/2020, ordered     MEDICARE ANNUAL WELLNESS VISIT  Today     FALL RISK ASSESSMENT  Steady     ADVANCE CARE PLANNING  We would like a copy please     LIPID  Today     COLORECTAL CANCER SCREENING  03/29/2022     TD 18+ HE  05/08/2028     Pneumococcal Vaccine: 65+ Years  Completed     INFLUENZA VACCINE RULE BASED  Completed     ZOSTER VACCINES  Completed     Pneumococcal Vaccine: Pediatrics (0 to 5 Years) and At-Risk Patients (6 to 64 Years)  NA     HEPATITIS B VACCINES  Completed      Pap-No longer needed    Subjective:   Chief Complaint: Tayler Mcmahon is an 70 y.o. female here for an Annual Wellness visit.   HPI:   Chronic cough: She has had a chronic cough for 4 years.  She recently see ENT.  They thought her cough could be related to acid reflux and asked her to increase her omeprazole to 40 mg daily for 3 months.  She has been on that for little over a month and thinks it might be helping with the cough.  She is not wishing to stay on this long-term because she knows it can lower her bone mass.    GERD: Patient does have acid reflux.  She normally is on omeprazole 20 mg daily.  See above.  She does drink 2 cups of coffee in the morning but does not have caffeine throughout the day.  Does drink a glass of wine 2-3 times a week.  Does not smoke or use any other tobacco products.  Does not use any ibuprofen.    Pre hypertension: Patient has checked her blood pressure on her own and it has ranged 120s to 130s over 70s.  She has not had readings over 140 on top or over 90 on the bottom.    Sleep Apnea and pulmonary nodules:  She follows with pulmonary for these issues.  Chest CT will be due in March 2021.  On her last CT she was told she had some groundglass opacities that could be consistent with the past pneumonia.  She feels that infection has cleared.    Weight gain: She has had some weight gain recently.  She feels it is related to not being able to go to the gym  right now because of Covid.  She is walking.  No other concerns.    Review of Systems:     Please see above.  The rest of the review of systems are negative for all systems.    Patient Care Team:  Rissa Corral MD as PCP - General (Family Medicine)  Kaushik Belcher MD as Physician (Pulmonary Disease)  Rissa Corral MD as Assigned PCP     Patient Active Problem List   Diagnosis     Chronic Reflux Esophagitis     Subacromial Bursitis On The Left     Furuncle     Ptosis Of Eyelid     Prediabetes     Plantar Fasciitis     Adult Sleep Apnea     Osteopenia     Pelvic Pain     Imaging Studies Nonspecific Abnormal Findings Genitourinary     Diastolic dysfunction     Vitamin D deficiency     Hyperlipidemia     PAC (premature atrial contraction)     Chronic cough     Pulmonary nodules     Past Medical History:   Diagnosis Date     Appendicitis 11/17/2016     Depression      Diverticulosis      Fibrocystic breast      HLD (hyperlipidemia)      Inverted nipple      PONV (postoperative nausea and vomiting)       Past Surgical History:   Procedure Laterality Date     BELPHAROPTOSIS REPAIR Left      HYSTEROSCOPY      Recorded: 08/20/2008;  Comments: With polyp removed, told benign     KNEE ARTHROSCOPY Left 10/23/2006    Dr. Calvo at [facility]     LAPAROSCOPIC APPENDECTOMY N/A 11/17/2016    Surgeon: Italo Fontaine MD;  Location: [facility] OR;  Service:      REDUCTION MAMMAPLASTY  1982      Family History   Problem Relation Age of Onset     Hypertension Mother      Transient ischemic attack Mother      Depression Mother      Polymyalgia rheumatica Mother      Lung cancer Mother 84     Diabetes type II Mother      Cancer Mother      Cancer Father 56        Tonsil     Colon cancer Father 57     Breast cancer Maternal Grandmother         mastectomy      Social History     Socioeconomic History     Marital status:      Spouse name: Not on file     Number of children: Not on file     Years of education: Not  on file     Highest education level: Not on file   Occupational History     Not on file   Social Needs     Financial resource strain: Not on file     Food insecurity     Worry: Not on file     Inability: Not on file     Transportation needs     Medical: Not on file     Non-medical: Not on file   Tobacco Use     Smoking status: Never Smoker     Smokeless tobacco: Never Used   Substance and Sexual Activity     Alcohol use: No     Drug use: No     Sexual activity: Never   Lifestyle     Physical activity     Days per week: Not on file     Minutes per session: Not on file     Stress: Not on file   Relationships     Social connections     Talks on phone: Not on file     Gets together: Not on file     Attends Judaism service: Not on file     Active member of club or organization: Not on file     Attends meetings of clubs or organizations: Not on file     Relationship status: Not on file     Intimate partner violence     Fear of current or ex partner: Not on file     Emotionally abused: Not on file     Physically abused: Not on file     Forced sexual activity: Not on file   Other Topics Concern     Not on file   Social History Narrative     Not on file      Current Outpatient Medications   Medication Sig Dispense Refill     acetaminophen (TYLENOL) 500 MG tablet Take 500 mg by mouth every 6 (six) hours as needed for pain.       calcium-vitamin D (CALCIUM-VITAMIN D) 500 mg(1,250mg) -200 unit per tablet Take 1 tablet by mouth daily.        cholecalciferol, vitamin D3, (VITAMIN D3) 2,000 unit cap Take 2,000 Units by mouth daily.       cyclobenzaprine (FLEXERIL) 10 MG tablet Take 1 tablet (10 mg total) by mouth 3 (three) times a day as needed for muscle spasms. 30 tablet 1     FLUoxetine (PROZAC) 20 MG capsule TAKE 1 CAPSULE (20 MG TOTAL) BY MOUTH DAILY. 90 capsule 3     fluticasone/vilanterol (BREO ELLIPTA INHL) Inhale.       ibuprofen (ADVIL,MOTRIN) 200 MG tablet Take 200-400 mg by mouth every 6 (six) hours as needed for  "pain.       magnesium oxide 500 mg Tab Take 500 mg by mouth daily.       omeprazole (PRILOSEC) 40 MG capsule TAKE 1 CAPSULE BY MOUTH ONCE DAILY AS DIRECTED       albuterol (PROAIR HFA;PROVENTIL HFA;VENTOLIN HFA) 90 mcg/actuation inhaler Inhale 2 puffs every 4 (four) hours as needed for wheezing. 1 each 0     fluticasone propionate (FLONASE) 50 mcg/actuation nasal spray Apply 1 spray into each nostril daily. (Patient taking differently: Apply 1 spray into each nostril as needed. ) 16 g 1     omeprazole (PRILOSEC) 20 MG capsule TAKE 1 CAPSULE (20 MG TOTAL) BY MOUTH DAILY BEFORE BREAKFAST. 90 capsule 3     No current facility-administered medications for this visit.       Objective:   Vital Signs:   Visit Vitals  /75 (Patient Site: Left Arm, Patient Position: Sitting, Cuff Size: Adult Regular)   Pulse 60   Temp 98.1  F (36.7  C) (Oral)   Resp 16   Ht 5' 2\" (1.575 m)   Wt 173 lb 8 oz (78.7 kg)   LMP 06/16/2000   BMI 31.73 kg/m           VisionScreening:  No exam data present     PHYSICAL EXAM  /76 (Patient Site: Left Arm, Patient Position: Sitting, Cuff Size: Adult Regular)   Pulse 60   Temp 98.1  F (36.7  C) (Oral)   Resp 16   Ht 5' 2\" (1.575 m)   Wt 173 lb 8 oz (78.7 kg)   LMP 06/16/2000   BMI 31.73 kg/m      General Appearance:    Alert, cooperative, no distress, appears stated age   Head:    Normocephalic, without obvious abnormality, atraumatic   Eyes:    PERRL, conjunctiva/corneas clear, EOM's intact, fundi     benign, both eyes   Ears:    Normal TM's and external ear canals, both ears   Nose:   Nares normal, septum midline, mucosa normal, no drainage     or sinus tenderness   Throat:   Lips, mucosa, and tongue normal; teeth and gums normal   Neck:   Supple, symmetrical, trachea midline, no adenopathy;     thyroid:  no enlargement/tenderness/nodules; no carotid    bruit or JVD   Back:     Symmetric, no curvature, ROM normal, no CVA tenderness   Lungs:     Clear to auscultation bilaterally, " respirations unlabored   Chest Wall:    No tenderness or deformity    Heart:    Regular rate and rhythm, S1 and S2 normal, no murmur, rub    or gallop   Breast Exam:    No tenderness, masses, or nipple abnormality   Abdomen:     Soft, non-tender, bowel sounds active all four quadrants,     no masses, no organomegaly           Extremities:   Extremities normal, atraumatic, no cyanosis or edema   Pulses:   2+ and symmetric all extremities   Skin:   Skin color, texture, turgor normal, no rashes or lesions   Lymph nodes:   Cervical, supraclavicular, and axillary nodes normal   Neurologic:   CNII-XII intact, normal strength, sensation and reflexes     throughout       Assessment Results 10/19/2020   Activities of Daily Living No help needed   Instrumental Activities of Daily Living No help needed   Get Up and Go Score -   Mini Cog Total Score 5   Some recent data might be hidden     A Mini-Cog score of 0-2 suggests the possibility of dementia, score of 3-5 suggests no dementia  .[email or web address]    Identified Health Risks:     The patient was counseled and encouraged to consider modifying their diet and eating habits. She was provided with information on recommended healthy diet options.  The patient was provided with written information regarding signs of hearing loss.  Information on urinary incontinence and treatment options given to patient.  Patient's advanced directive was discussed and not on file the patient's wishes.

## 2021-06-14 NOTE — TELEPHONE ENCOUNTER
RN cannot approve Refill Request    RN can NOT refill this medication med is not covered by policy/route to provider and historical medication requested. Last office visit: Visit date not found Last Physical: 10/19/2020 Last MTM visit: Visit date not found Last visit same specialty: 2/18/2020 Francisca Olson MD.  Next visit within 3 mo: Visit date not found  Next physical within 3 mo: Visit date not found      Comfort Joyce, [facility] Triage/Med Refill 1/6/2021    Requested Prescriptions   Pending Prescriptions Disp Refills     omeprazole (PRILOSEC) 40 MG capsule  0     Sig: TAKE 1 CAPSULE BY MOUTH ONCE DAILY AS DIRECTED       GI Medications Refill Protocol Passed - 1/5/2021 12:05 PM        Passed - PCP or prescribing provider visit in last 12 or next 3 months.     Last office visit with prescriber/PCP: Visit date not found OR same dept: 2/18/2020 Francisca Olson MD OR same specialty: 2/18/2020 Francisca Olson MD  Last physical: 10/19/2020 Last MTM visit: Visit date not found   Next visit within 3 mo: Visit date not found  Next physical within 3 mo: Visit date not found  Prescriber OR PCP: Rissa Corral MD  Last diagnosis associated with med order: 1. Mood changes  - FLUoxetine (PROZAC) 20 MG capsule; Take 1 capsule (20 mg total) by mouth daily.  Dispense: 90 capsule; Refill: 3    2. Back muscle spasm  - cyclobenzaprine (FLEXERIL) 10 MG tablet; Take 1 tablet (10 mg total) by mouth 3 (three) times a day as needed for muscle spasms.  Dispense: 30 tablet; Refill: 1    If protocol passes may refill for 12 months if within 3 months of last provider visit (or a total of 15 months).                FLUoxetine (PROZAC) 20 MG capsule 90 capsule 3     Sig: Take 1 capsule (20 mg total) by mouth daily.       SSRI Refill Protocol  Passed - 1/5/2021 12:05 PM        Passed - PCP or prescribing provider visit in last year     Last office visit with prescriber/PCP: Visit date not found OR same dept:  2/18/2020 Francisca Olson MD OR same specialty: 2/18/2020 Francisca Olson MD  Last physical: 10/19/2020 Last MTM visit: Visit date not found   Next visit within 3 mo: Visit date not found  Next physical within 3 mo: Visit date not found  Prescriber OR PCP: Rissa Corral MD  Last diagnosis associated with med order: 1. Mood changes  - FLUoxetine (PROZAC) 20 MG capsule; Take 1 capsule (20 mg total) by mouth daily.  Dispense: 90 capsule; Refill: 3    2. Back muscle spasm  - cyclobenzaprine (FLEXERIL) 10 MG tablet; Take 1 tablet (10 mg total) by mouth 3 (three) times a day as needed for muscle spasms.  Dispense: 30 tablet; Refill: 1    If protocol passes may refill for 12 months if within 3 months of last provider visit (or a total of 15 months).                cyclobenzaprine (FLEXERIL) 10 MG tablet 30 tablet 1     Sig: Take 1 tablet (10 mg total) by mouth 3 (three) times a day as needed for muscle spasms.       There is no refill protocol information for this order

## 2021-06-15 NOTE — PROGRESS NOTES
ASSESSMENT/ PLAN    1. Vaginal burning  2. Rectal burning  Symptoms ongoing for about a week and worsening. Exam remarkable for a pruritic appearing erythematous rash involving anus, labia, and perineum with some open irritated ulcerated spots. Most likely yeast infection. Advised patient to do sitz bath, stop wearing pantiliners, wear loose fitting clothing at home. Will give her ointment to help her symptoms. Performed wet prep and this was negative.   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 Started in the last week and is worsening. She reports burning and itchiness because of the rash. She started wearing pantiliners as she has been coughing due to a cold for about a week. Her cough and cold symptoms have now improved but she developed the rash after she started wearing pantiliners. She denies fever/chills, pain with urination, blood in urine/stool, abnormal vaginal bleeding. She hasn't tried anything OTC to help her symptoms. She is not sexually active currently and has never had STDs in the past. She has a history of hemorrhoids so she is worried that this is hemorrhoids again.     Review of Systems:  A 12 point comprehensive review of systems was negative except as noted in HPI.    The following portions of the patient's history were reviewed and updated as appropriate: past medical history, past surgical history, family history, allergies, current medications and problem list.    Medical History  Active Ambulatory (Non-Hospital) Problems    Diagnosis     PAC (premature atrial contraction)     Diastolic  dysfunction     Vitamin D deficiency     Hyperlipidemia     Osteopenia     Pelvic Pain     Imaging Studies Nonspecific Abnormal Findings Genitourinary     Edema     Chronic Reflux Esophagitis     Subacromial Bursitis On The Left     Furuncle     Ptosis Of Eyelid     Prediabetes     Plantar Fasciitis     Adult Sleep Apnea     Past Medical History:   Diagnosis Date     Appendicitis 11/17/2016     Depression      Diverticulosis      Fibrocystic breast      GERD (gastroesophageal reflux disease)      HLD (hyperlipidemia)      Inverted nipple      PONV (postoperative nausea and vomiting)      Sleep apnea        Surgical History  She  has a past surgical history that includes pr reduction of large breast; pr hysteroscopy,w/endo bx; Reduction mammaplasty (1982); and Laparoscopic appendectomy (N/A, 11/17/2016).    Social History  Reviewed, and she  reports that she has never smoked. She has never used smokeless tobacco. She reports that she does not drink alcohol or use illicit drugs.    Family History  Reviewed, and family history includes Cancer (age of onset: 56) in her father; Cancer (age of onset: 84) in her mother; Depression in her mother; Hypertension in her mother; Transient ischemic attack in her mother.    Medications  Reviewed and reconciled.      Allergies  Allergies   Allergen Reactions     Augmentin [Amoxicillin-Pot Clavulanate] Other (See Comments)     Gi upset     Vicryl Sutures          OBJECTIVE  Physical Exam:  Vital signs: /62 (Patient Site: Left Arm, Patient Position: Sitting, Cuff Size: Adult Regular)  Pulse 68  Temp 98.4  F (36.9  C) (Oral)   Resp 18  Wt 172 lb 8 oz (78.2 kg)  LMP 06/16/2000  BMI 31.55 kg/m2  Weight: 172 lb 8 oz (78.2 kg)    General appearance: pleasant, appears stated age, cooperative and in no distress  Lymph: no cervical/supraclavicular adenopathy  Respiratory: clear to auscultation bilaterally, good air movement throughout, no wheezing or crackles, speaking full  sentences without difficulty  Cardiovascular: regular rate and rhythm, no murmur appreciated, no leg edema  Abdomen: active bowel sounds, soft, non-tender, non-distended  : anal skin tags, erythematous pruritic burning rash spanning anus and labia and perineum area, some open spots weepy.   Neuro: alert oriented x 3, grossly normal otherwise  Psych: normal affect, appropriate conversation

## 2021-06-16 PROBLEM — R05.3 CHRONIC COUGH: Status: ACTIVE | Noted: 2019-05-02

## 2021-06-16 PROBLEM — R91.8 PULMONARY NODULES: Status: ACTIVE | Noted: 2019-05-02

## 2021-06-16 NOTE — TELEPHONE ENCOUNTER
Telephone Encounter by Radha Mendoza at 5/23/2019  3:01 PM     Author: Radha Mendoza Service: -- Author Type: --    Filed: 5/23/2019  3:04 PM Encounter Date: 5/23/2019 Status: Addendum    : Radha Mendoza    Related Notes: Original Note by Radha Mendoza filed at 5/23/2019  3:01 PM       [place] APPROVED:    Approval start date: 5/22/2019  Approval end date: 5/22/2021

## 2021-06-17 NOTE — PROGRESS NOTES
Assessment and Plan:   1. Post-menopausal  - DXA Bone Density Scan; Future    2. Healthcare maintenance  Last pap reviewed from 2014. No longer needs pap due to age (no abnormal). COlon and breast screening up to date. Fasting labs today.   - Lipid Cascade  - Comprehensive Metabolic Panel    3. Need for vaccination  Check with insurance coverage for shingrix and tetanus    4. Hypomagnesemia  Takes a mg supplement, check levels today  - Magnesium    5. Depression  In remission. She prefers to continue fluoxetine. Recheck in one year, sooner if problems. CMP today.    6. Muscle spasm  Intermittent flexeril use. Ok to refill when needed.    7. GERD  Disucssed concerns and possible links with chronic PPI use. No known history of ulcer or esophagitis. She can try prn PPI use or daily zantac. If symptoms not controlled with these other measures, ok to use daily PPI. Currently, symptoms are well controlled, no dysphagia, no weight loss or problems with appetite, but if any of there occur, recommend EGD.    8. Chronic cough  Discussed that chronic cough can be caused by post nasal drainage, reflux, asthma, among other things. She will continue to work with her pulmonary/sleep med specialist she sees next week, who has already been working up her cough.      The patient's current medical problems were reviewed.    I have had an Advance Directives discussion with the patient.    The following health maintenance schedule was reviewed with the patient and provided in printed form in the after visit summary:   Health Maintenance   Topic Date Due     DXA SCAN  08/20/2016     TD 18+ HE  08/18/2018     FALL RISK ASSESSMENT  06/12/2018     MAMMOGRAM  03/22/2020     ADVANCE DIRECTIVES DISCUSSED WITH PATIENT  08/26/2020     COLONOSCOPY  03/29/2022     PNEUMOCOCCAL POLYSACCHARIDE VACCINE AGE 65 AND OVER  Completed     INFLUENZA VACCINE RULE BASED  Completed     PNEUMOCOCCAL CONJUGATE VACCINE FOR ADULTS (PCV13 OR PREVNAR)  Completed      ZOSTER VACCINE  Completed        Subjective:   Chief Complaint: Tayler Mcmahon is an 68 y.o. female here for a Welcome to Medicare visit.   HPI:   Health Maintenance: She is up to date on colonoscopy; last 3/29/17, hyperplastic polyp, repeat in 5 years due to family history. She is up to date on mammogram, last 3/22/18, normal, repeat yearly. Due for tetanus booster, plans to check with insurance. Due for DXA, last 8/20/14, osteopenia with a t-score of -1.5 in the left femoral neck.    Muscle Spasms: She takes Flexeril as needed at night time for muscle spasms and back issues.    Mood Issues: She continues on 20 mg Prozac daily. She explains having been on this medication since her divorce in 1997. She has tried to taper off the medication in the past, but felt very emotional. Feels she is doing well on the current dose and would like to continue.    Heart Burn: She takes 20 mg omeprazole daily for heart burn. Notices discomfort on days when she does not take her medication. Reports having had an upper endoscopy many years ago which revealed a small hiatal hernia.    Trigger Finger: She explains that she recently got an injection in her right hand for trigger finger. She notes that she sometimes feels something catch when she bends her thumb. Also has trigger finger in left hand, 3rd digit. Suspects she may need surgery to fix the issue.    Dry Cough: She has had a dry cough for many years, sometimes coughs up white or clear sputum. She occasionally wakes up in the morning with a runny nose, which makes the cough worse. She follows with a sleep medicine doctor, Dr. Kaushik Belcher, has an appointment in the coming week to follow up regarding this issue. She sometimes gets a tickle in her throat which triggers the cough. She reports having had been prescribed an inhaled steroid and normal pulmonary function test in the past. She states her sleep specialist has a pulmonology background.     Hemorrhoids: She has  been seen for hemorrhoids in the past. She notes that the area gets very itchy. The hemorrhoids seem to make it harder to clean her bottom after a bowel movement which causes her to wipe more. She has been wearing a sanitary pad due to some urinary leakage from the cough. She also has been using a nystatin cream on the area as needed.    Review of Systems:  She has questions regarding how much calcium and vitamin D supplement she should take. Takes ibuprofen and Tylenol as needed. Also takes baby aspirin and a magnesium supplement daily. The rest of the review of systems are negative for all systems.    PFSH:  Family: MGM breast cancer with mastectomy. Negative for ovarian cancer.    Patient Care Team:  Rissa Corral MD as PCP - General (Family Medicine)  Kaushik Belcher MD as Physician (Pulmonary Disease)     Patient Active Problem List   Diagnosis     Chronic Reflux Esophagitis     Subacromial Bursitis On The Left     Furuncle     Ptosis Of Eyelid     Prediabetes     Plantar Fasciitis     Adult Sleep Apnea     Osteopenia     Pelvic Pain     Imaging Studies Nonspecific Abnormal Findings Genitourinary     Edema     Diastolic dysfunction     Vitamin D deficiency     Hyperlipidemia     PAC (premature atrial contraction)     Past Medical History:   Diagnosis Date     Appendicitis 11/17/2016     Depression      Diverticulosis      Fibrocystic breast      HLD (hyperlipidemia)      Inverted nipple      PONV (postoperative nausea and vomiting)       Past Surgical History:   Procedure Laterality Date     BELPHAROPTOSIS REPAIR Left      HYSTEROSCOPY      Recorded: 08/20/2008;  Comments: With polyp removed, told benign     KNEE ARTHROSCOPY Left 10/23/2006    Dr. Calvo at [facility]     LAPAROSCOPIC APPENDECTOMY N/A 11/17/2016    Surgeon: Italo Fontaine MD;  Location: [facility];  Service:      REDUCTION MAMMAPLASTY  1982      Family History   Problem Relation Age of Onset     Hypertension Mother      Transient  "ischemic attack Mother      Depression Mother      Polymyalgia rheumatica Mother      Lung cancer Mother 84     Diabetes type II Mother      Cancer Father 56     Tonsil     Colon cancer Father 57      Social History     Social History     Marital status:      Spouse name: N/A     Number of children: N/A     Years of education: N/A     Occupational History     Not on file.     Social History Main Topics     Smoking status: Never Smoker     Smokeless tobacco: Never Used     Alcohol use No     Drug use: No     Sexual activity: No     Other Topics Concern     Not on file     Social History Narrative       Current Outpatient Prescriptions   Medication Sig Dispense Refill     acetaminophen (TYLENOL) 500 MG tablet Take 500 mg by mouth every 6 (six) hours as needed for pain.       aspirin 81 MG EC tablet Take 81 mg by mouth daily.       cyclobenzaprine (FLEXERIL) 10 MG tablet Take 1 tablet (10 mg total) by mouth 3 (three) times a day as needed for muscle spasms. 30 tablet 1     FLUoxetine (PROZAC) 20 MG capsule TAKE 1 CAPSULE DAILY 90 capsule 2     ibuprofen (ADVIL,MOTRIN) 200 MG tablet Take 200-400 mg by mouth every 6 (six) hours as needed for pain.       magnesium oxide 500 mg Tab Take 500 mg by mouth daily.       nystatin (MYCOSTATIN) powder Apply to affected area 3 times daily 60 g 0     omeprazole (PRILOSEC) 20 MG capsule TAKE 1 CAPSULE EVERY DAY BEFORE BREAKFAST 90 capsule 2     calcium-vitamin D (CALCIUM-VITAMIN D) 500 mg(1,250mg) -200 unit per tablet Take 1 tablet by mouth daily.        cholecalciferol, vitamin D3, (VITAMIN D3) 2,000 unit cap Take 2,000 Units by mouth daily.       No current facility-administered medications for this visit.       Objective:   Vital Signs:   Visit Vitals     /76 (Patient Site: Left Arm, Patient Position: Sitting, Cuff Size: Adult Regular)     Pulse 64     Temp 97.6  F (36.4  C) (Oral)     Resp 20     Ht 5' 2.25\" (1.581 m)     Wt 171 lb (77.6 kg)     LMP 06/16/2000     " BMI 31.03 kg/m2        EKG:  Not indicated    Vision Screening:   Visual Acuity Screening    Right eye Left eye Both eyes   Without correction:      With correction: 10/15 10/50         PHYSICAL EXAM  GENERAL:  Pleasant, well-appearing woman in no acute distress.  VITAL SIGNS:  Reviewed.  HEENT:  Pupils are equal, round, and reactive to light.  Sclerae and  conjunctivae clear.  TMs are clear bilaterally.  Oropharynx is clear with  moist mucous membranes.  NECK:  Supple without lymphadenopathy or thyromegaly.  No carotid bruits.  CARDIOVASCULAR:  Heart regular rate and rhythm without murmur.  Normal S1 and S2.  LUNGS:  Clear to auscultation bilaterally without wheezes or crackles.  Good air movement throughout.  BREASTS:  Normal contours.  No skin dimpling, nipple retraction or nipple  discharge.  Palpation reveals no masses, no supraclavicular or axillary  lymphadenopathy.    ABDOMEN:  Soft, nontender, and nondistended without  guarding or rebound.  No organomegaly.  PELVIS:  Normal female external genitalia.  No skin lesions.  Bimanual exam reveals no cervical motion tenderness, no uterine or adnexal masses or tenderness.  EXTREMITIES:  Warm and well perfused without edema. Dorsalis pedis pulses easily palpable and symmetric bilaterally.  NEURO: Alert and oriented. Grossly nonfocal.  PSYCHIATRIC: Presents on time and well groomed.  Normal speech and thought content.  Full affect.  No abnormal movements or behaviors noted.       Assessment Results 4/9/2018   Activities of Daily Living No help needed   Instrumental Activities of Daily Living No help needed   Get Up and Go Score Less than 12 seconds   Mini Cog Total Score 5   Some recent data might be hidden     A Mini Cog score of 0-2 suggests the possibility of dementia, score of 3-5 suggests no dementia    Identified Health Risks:     The patient was counseled and encouraged to consider modifying their diet and eating habits. She was provided with information on  recommended healthy diet options.  The patient was provided with written information regarding signs of hearing loss.  Patient's advanced directive was discussed and patient needs to review and update, not on file the patient's wishes.      ADDITIONAL HISTORY SUMMARIZED (2): Reviewed 3/29/17 colonoscopy, normal. Reviewed 1/8/18 dermatology note regarding skin exam and 5/23/17 ENT note regarding hearing loss.  DECISION TO OBTAIN EXTRA INFORMATION (1): None.   RADIOLOGY TESTS (1): Ordered DXA today. Reviewed mammogram from 3/22/18.   LABS (1): Labs were ordered today.  MEDICINE TESTS (1): None.  INDEPENDENT REVIEW (2 each): None.     The visit lasted a total of 30 minutes face to face with the patient. Over 50% of the time was spent counseling and educating the patient about health maintenance.    IMaddie, am scribing for and in the presence of, Dr. Olsno.    I, Dr. Dr. Olson, personally performed the services described in this documentation, as scribed by Maddie Gray in my presence, and it is both accurate and complete.    Total data points: 4

## 2021-06-17 NOTE — PROGRESS NOTES
Assessment:   The encounter diagnosis was Depression, unspecified depression type.     Plan:     Medications Ordered   Medications     FLUoxetine (PROZAC) 10 MG capsule     Sig: Take 1 capsule (10 mg total) by mouth daily. To take with the 20 mg to equal 30 mg daily.     Dispense:  90 capsule     Refill:  3     There are no Patient Instructions on file for this visit.  Return for further follow up if needed. Call [phone]) or schedule an appointment via [employer]..    Subjective:   Tayler Mcmahon is a 71 y.o. female who submitted an eVisit request for evaluation of her Other (Entered automatically based on patient selection in [employer].).  See the questionnaire and message section of encounter report for information related to history of present illness and review of systems.    The following portions of the patient's history were reviewed and updated as appropriate:  She does not have any pertinent problems on file.  She is allergic to augmentin [amoxicillin-pot clavulanate] and vicryl sutures..     Objective:   No exam performed today, patient submitted as eVisit.  Provider E-Visit time total (minutes): 6

## 2021-06-17 NOTE — PATIENT INSTRUCTIONS - HE
Patient Instructions by Viridiana Saleh Scribe at 5/2/2019  2:40 PM     Author: Viridiana Saleh Scribe Service: -- Author Type: Pari    Filed: 5/2/2019  3:59 PM Encounter Date: 5/2/2019 Status: Addendum    : Rissa Corral MD (Physician)    Related Notes: Original Note by Rissa Corral MD (Physician) filed at 5/2/2019  3:58 PM       Per recent guidelines, you can stop your 81 mg baby Aspirin.     Seeing pulmonology.  Using Breo Ellipta inhaler per pulmonology and will let them know if it is helpful for you jaquelin noriega.    Will be following up with ENT this fall.    Seeing dermatology yearly for full body skin check.    Requesting all notes and pathology reports from [facility].    Recommend 3 dairy servings a day or calcium with vitamin D twice a day with food.    If you are interested in the new shingles shot, Shingrix, please check with insurance for coverage either in clinic or at a pharmacy. It is a 2 shot series 2-6 months apart.     Bone density due April, 2020.    Colonoscopy due in 2022.    Per pulmonology, another chest CT in April, 2020.  When doing a follow up chest CT, ask to monitor the mild dilation of the ascending thoracic aorta.     Please check with insurance to see if a heart ultrasound or echo is covered for diastolic disfunction.    Abdominal ultrasound ordered today for upper abdominal pain.   If that is normal and symptoms persist, we will consider an abdominal pelvis CT, HIDA scan and endoscopy.    For the vulvar itching, miconizole 2% cream is available over the counter. Use twice a day for 1 week.      Please set a fasting lab appointment.     Patient Education   Understanding USDA MyPlate  The USDA (US Department of Agriculture) has guidelines to help you make healthy food choices. These are called MyPlate. MyPlate shows the food groups that make up healthy meals using the image of a place setting. Before you eat, think about the healthiest choices for what to  put onto your plate or into your cup or bowl. To learn more about building a healthy plate, visit www.choosemyplate.gov.       The Food Groups    Fruits: Any fruit or 100% fruit juice counts as part of the Fruit Group. Fruits may be fresh, canned, frozen, or dried, and may be whole, cut-up, or pureed. Make half your plate fruits and vegetables.    Vegetables: Any vegetable or 100% vegetable juice counts as a member of the Vegetable Group. Vegetables may be fresh, frozen, canned, or dried. They can be served raw or cooked and may be whole, cut-up, or mashed. Make half your plate fruits and vegetables.     Grains: All foods made from grains are part of the Grains Group. These include wheat, rice, oats, cornmeal, and barley such as bread, pasta, oatmeal, cereal, tortillas, and grits. Grains should be no more than a quarter of your plate. At least half of your grains should be whole grains.    Protein: This group includes meat, poultry, seafood, beans and peas, eggs, processed soy products (like tofu), nuts (including nut butters), and seeds. Make protein choices no more than a quarter of your plate. Meat and poultry choices should be lean or low fat.    Dairy: All fluid milk products and foods made from milk that contain calcium, like yogurt and cheese are part of the Dairy Group. (Foods that have little calcium, such as cream, butter, and cream cheese, are not part of the group.) Most dairy choices should be low-fat or fat-free.    Oils: These are fats that are liquid at room temperature. They include canola, corn, olive, soybean, and sunflower oil. Foods that are mainly oil include mayonnaise, certain salad dressings, and soft margarines. You should have only 5 to 7 teaspoons of oils a day. You probably already get this much from the food you eat.  Use [employer] to Help Build Your Meals  The SuperTracker can help you plan and track your meals and activity. You can look up individual foods to see or compare their  nutritional value. You can get guidelines for what and how much you should eat. You can compare your food choices. And you can assess personal physical activities and see ways you can improve. Go to www.[employer].gov/supertracker/.    2418-2320 [employer]. [address] [place] [address]. All rights reserved. This information is not intended as a substitute for professional medical care. Always follow your healthcare professional's instructions.           Patient Education   Signs of Hearing Loss  Hearing loss is a problem shared by many people. In fact, it is one of the most common health conditions, particularly as people age. Most people over age 65 have some hearing loss, and by age 80, almost everyone does. Because hearing loss usually occurs slowly over the years, you may not realize your hearing ability has gotten worse.       Have your hearing checked  Contact your [facility] care provider if you:    Have to strain to hear normal conversation.    Have to watch other peoples faces very carefully to follow what theyre saying.    Need to ask people to repeat what theyve said.    Often misunderstand what people are saying.    Turn the volume of the television or radio up so high that others complain.    Feel that people are mumbling when theyre talking to you.    Find that the effort to hear leaves you feeling tired and irritated.    Notice, when using the phone, that you hear better with 1 ear than the other.    1638-3562 [employer]. [address] [place], [place] [id number]. All rights reserved. This information is not intended as a substitute for professional medical care. Always follow your healthcare professional's instructions.           Advance Directive  Patients advance directive was discussed and I am comfortable with the patients wishes.  Patient Education   Personalized Prevention Plan  You are due for the preventive services outlined below.  Your care team is  available to assist you in scheduling these services.  If you have already completed any of these items, please share that information with your care team to update in your medical record.        Health Maintenance   Topic Date Due   ? ZOSTER VACCINES (2 of 3) If you are interested in the new shingles shot, Shingrix, please check with insurance for coverage either in clinic or at a pharmacy. It is a 2 shot series 2-6 months apart.    ? FALL RISK ASSESSMENT  Steady   ? INFLUENZA VACCINE RULE BASED (Season Ended) 08/01/2019   ? MAMMOGRAM  Ordered   ? DXA SCAN  04/30/2020   ? COLONOSCOPY  03/29/2022   ? ADVANCE DIRECTIVES DISCUSSED WITH PATIENT  We would like a copy please   ? TD 18+ HE  05/08/2028   ? PNEUMOCOCCAL POLYSACCHARIDE VACCINE AGE 65 AND OVER  Completed   ? PNEUMOCOCCAL CONJUGATE VACCINE FOR ADULTS (PCV13 OR PREVNAR)  Completed      Pap-Today

## 2021-06-18 NOTE — PATIENT INSTRUCTIONS - HE
Patient Instructions by Rissa Corral MD at 7/17/2020  8:58 AM     Author: Rissa Corral MD Service: -- Author Type: Physician    Filed: 7/17/2020  8:58 AM Encounter Date: 7/17/2020 Status: Signed    : Rissa Corral MD (Physician)           Urinary Tract Infections in Women    Urinary tract infections (UTIs) are most often caused by bacteria. These bacteria enter the urinary tract. The bacteria may come from inside the body. Or they may travel from the skin outside the rectum or vagina into the urethra. Female anatomy makes it easy for bacteria from the bowel to enter a womans urinary tract, which is the most common source of UTI. This means women develop UTIs more often than men. Pain in or around the urinary tract is a common UTI symptom. But the only way to know for sure if you have a UTI for the healthcare provider to test your urine. The two tests that may be done are the urinalysis and urine culture.  Types of UTIs    Cystitis. A bladder infection (cystitis) is the most common UTI in women. You may have urgent or frequent need to pee. You may also have pain, burning when you pee, and bloody urine.    Urethritis. This is an inflamed urethra, which is the tube that carries urine from the bladder to outside the body. You may have lower stomach or back pain. You may also have urgent or frequent need to pee.    Pyelonephritis. This is a kidney infection. If not treated, it can be serious and damage your kidneys. In severe cases, you may need to stay in the hospital. You may have a fever and lower back pain.    Medicines to treat a UTI  Most UTIs are treated with antibiotics. These kill the bacteria. The length of time you need to take them depends on the type of infection. It may be as short as 3 days. If you have repeated UTIs, you may need a low-dose antibiotic for several months. Take antibiotics exactly as directed. Dont stop taking them until all of the medicine is gone. If you stop  taking the antibiotic too soon, the infection may not go away. You may also develop a resistance to the antibiotic. This can make it much harder to treat.  Lifestyle changes to treat and prevent UTIs  The lifestyle changes below will help get rid of your UTI. They may also help prevent future UTIs.    Drink plenty of fluids. This includes water, juice, or other caffeine-free drinks. Fluids help flush bacteria out of your body.    Empty your bladder. Always empty your bladder when you feel the urge to pee. And always pee before going to sleep. Urine that stays in your bladder can lead to infection. Try to pee before and after sex as well.    Practice good personal hygiene. Wipe yourself from front to back after using the toilet. This helps keep bacteria from getting into the urethra.    Use condoms during sex. These help prevent UTIs caused by sexually transmitted bacteria. Also don't use spermicides during sex. These can increase the risk for UTIs. Choose other forms of birth control instead. For women who tend to get UTIs after sex, a low-dose of a preventive antibiotic may be used. Be sure to discuss this option with your healthcare provider.    Follow up with your healthcare provider as directed. He or she may test to make sure the infection has cleared. If needed, more treatment may be started.  Date Last Reviewed: 7/1/2019 2000-2019 [employer]. [address] [place] [address], [place] [address]. All rights reserved. This information is not intended as a substitute for professional medical care. Always follow your healthcare professional's instructions.

## 2021-06-18 NOTE — PATIENT INSTRUCTIONS - HE
Patient Instructions by Comfort Zhong MD at 3/6/2020 12:20 PM     Author: Comfort Zhong MD Service: -- Author Type: Physician    Filed: 3/6/2020  1:27 PM Encounter Date: 3/6/2020 Status: Signed    : Comfort Zhong MD (Physician)         Patient Education     Sinusitis (Antibiotic Treatment)    The sinuses are air-filled spaces within the bones of the face. They connect to the inside of the nose. Sinusitis is an inflammation of the tissue that lines the sinuses. Sinusitis can occur during a cold. It can also happen due to allergies to pollens and other particles in the air. Sinusitis can cause symptoms of sinus congestion and a feeling of fullness. A sinus infection causes fever, headache, and facial pain. There is often green or yellow fluid draining from the nose or into the back of the throat (post-nasal drip). You have been given antibiotics to treat this condition.  Home care    Take the full course of antibiotics as instructed. Do not stop taking them, even when you feel better.    Drink plenty of water, hot tea, and other liquids. This may help thin nasal mucus. It also may help your sinuses drain fluids.    Heat may help soothe painful areas of your face. Use a towel soaked in hot water. Or,  the shower and direct the warm spray onto your face. Using a vaporizer along with a menthol rub at night may also help soothe symptoms.     An expectorant with guaifenesin may help thin nasal mucus and help your sinuses drain fluids.    You can use an over-the-counter decongestant, unless a similar medicine was prescribed to you. Nasal sprays work the fastest. Use one that contains phenylephrine or oxymetazoline. First blow your nose gently. Then use the spray. Do not use these medicines more often than directed on the label. If you do, your symptoms may get worse. You may also take pills that contain pseudoephedrine. Dont use products that combine multiple medicines. This is because side  effects may be increased. Read labels. You can also ask the pharmacist for help. (People with high blood pressure should not use decongestants. They can raise blood pressure.)    Over-the-counter antihistamines may help if allergies contributed to your sinusitis.      Do not use nasal rinses or irrigation during an acute sinus infection, unless your healthcare provider tells you to. Rinsing may spread the infection to other areas in your sinuses.    Use acetaminophen or ibuprofen to control pain, unless another pain medicine was prescribed to you. If you have chronic liver or kidney disease or ever had a stomach ulcer, talk with your healthcare provider before using these medicines. (Aspirin should never be taken by anyone under age 18 who is ill with a fever. It may cause severe liver damage.)    Don't smoke. This can make symptoms worse.  Follow-up care  Follow up with your healthcare provider or our staff if you are better in 1 week.  When to seek medical advice  Call your healthcare provider if any of these occur:    Facial pain or headache that gets worse    Stiff neck    Unusual drowsiness or confusion    Swelling of your forehead or eyelids    Vision problems, such as blurred or double vision    Fever of 100.4 F (38 C) or higher, or as directed by your healthcare provider    Seizure    Breathing problems    Symptoms don't go away in 10 days  Prevention  Here are steps you can take to help prevent an infection:    Keep good hand washing habits.    Dont have close contact with people who have sore throats, colds, or other upper respiratory infections.    Dont smoke, and stay away from secondhand smoke.    Stay up to date with of your vaccines.  Date Last Reviewed: 11/1/2017 2000-2017 [employer]. [address] [place] [address], [place] [address]. All rights reserved. This information is not intended as a substitute for professional medical care. Always follow your healthcare professional's  instructions.

## 2021-06-18 NOTE — PATIENT INSTRUCTIONS - HE
Patient Instructions by Rissa Corral MD at 10/19/2020 10:40 AM     Author: Rissa Corral MD Service: -- Author Type: Physician    Filed: 10/19/2020 12:09 PM Encounter Date: 10/19/2020 Status: Addendum    : Rissa Corral MD (Physician)    Related Notes: Original Note by Rissa Corral MD (Physician) filed at 10/19/2020 12:07 PM       Abdominal ultrasound ordered to screen for aneurysm.    Can check if insurance covers a hepatitis C and HIV screen and we are happy to draw those.    Recommend 3 dairy servings a day or calcium with vitamin D twice a day with food.    Per ENT they have had to increase her omeprazole to 40 mg for total of 3 months then back down to 20 mg.  If the cough persists on the 40 mg they wanted to see you to work with a speech pathologist in case this is a habitual cough.    To see ENT in 2 years otherwise.    Sees pulmonary for sleep apnea and pulmonary nodule.    Chest CT next March per pulmonary.    If you feel you have signs of postnasal drip you could use Flonase 2 sprays each nostril daily.  Then you could see if that helps with the cough.    Try to decrease coffee to 1 cup a day.    Continue to check your blood pressure at home and if over 140 on top or over 90 on the bottom please let me know.    Patient Education   Understanding USDA MyPlate  The USDA (US Department of Agriculture) has guidelines to help you make healthy food choices. These are called MyPlate. MyPlate shows the food groups that make up healthy meals using the image of a place setting. Before you eat, think about the healthiest choices for what to put onto your plate or into your cup or bowl. To learn more about building a healthy plate, visit www.choosemyplate.gov.       The Food Groups    Fruits: Any fruit or 100% fruit juice counts as part of the Fruit Group. Fruits may be fresh, canned, frozen, or dried, and may be whole, cut-up, or pureed. Make half your plate fruits and  vegetables.    Vegetables: Any vegetable or 100% vegetable juice counts as a member of the Vegetable Group. Vegetables may be fresh, frozen, canned, or dried. They can be served raw or cooked and may be whole, cut-up, or mashed. Make half your plate fruits and vegetables.     Grains: All foods made from grains are part of the Grains Group. These include wheat, rice, oats, cornmeal, and barley such as bread, pasta, oatmeal, cereal, tortillas, and grits. Grains should be no more than a quarter of your plate. At least half of your grains should be whole grains.    Protein: This group includes meat, poultry, seafood, beans and peas, eggs, processed soy products (like tofu), nuts (including nut butters), and seeds. Make protein choices no more than a quarter of your plate. Meat and poultry choices should be lean or low fat.    Dairy: All fluid milk products and foods made from milk that contain calcium, like yogurt and cheese are part of the Dairy Group. (Foods that have little calcium, such as cream, butter, and cream cheese, are not part of the group.) Most dairy choices should be low-fat or fat-free.    Oils: These are fats that are liquid at room temperature. They include canola, corn, olive, soybean, and sunflower oil. Foods that are mainly oil include mayonnaise, certain salad dressings, and soft margarines. You should have only 5 to 7 teaspoons of oils a day. You probably already get this much from the food you eat.  Use Pocits to Help Build Your Meals  The [employer] can help you plan and track your meals and activity. You can look up individual foods to see or compare their nutritional value. You can get guidelines for what and how much you should eat. You can compare your food choices. And you can assess personal physical activities and see ways you can improve. Go to www.MyToons.gov/supertracker/.    2436-7815 [employer]. [address] [place] [address]. All rights  reserved. This information is not intended as a substitute for professional medical care. Always follow your healthcare professional's instructions.           Patient Education   Signs of Hearing Loss  Hearing loss is a problem shared by many people. In fact, it is one of the most common health conditions, particularly as people age. Most people over age 65 have some hearing loss, and by age 80, almost everyone does. Because hearing loss usually occurs slowly over the years, you may not realize your hearing ability has gotten worse.       Have your hearing checked  Contact your [facility] care provider if you:    Have to strain to hear normal conversation.    Have to watch other peoples faces very carefully to follow what theyre saying.    Need to ask people to repeat what theyve said.    Often misunderstand what people are saying.    Turn the volume of the television or radio up so high that others complain.    Feel that people are mumbling when theyre talking to you.    Find that the effort to hear leaves you feeling tired and irritated.    Notice, when using the phone, that you hear better with 1 ear than the other.    2663-4667 [employer]. [address] [place] [address]. All rights reserved. This information is not intended as a substitute for professional medical care. Always follow your healthcare professional's instructions.         Patient Education   Urinary Incontinence, Female (Adult)  Urinary incontinence means loss of control of the bladder. This problem affects many women, especially as they get older. If you have incontinence, you may be embarrassed to ask for help. But know that this problem can be treated.  Types of Incontinence  There are different types of incontinence. Two of the main types are described here. You can have more than one type.    Stress incontinence. With this type, urine leaks when pressure (stress) is put on the bladder. This may happen when you cough,  sneeze, or laugh. Stress incontinence most often occurs because the pelvic floor muscles that support the bladder and urethra are weak. This can happen after pregnancy and vaginal childbirth or a hysterectomy. It can also be due to excess body weight or hormone changes.    Urge incontinence (also called overactive bladder). With this type, a sudden urge to urinate is felt often. This may happen even though there may not be much urine in the bladder. The need to urinate often during the night is common. Urge incontinence most often occurs because of bladder spasms. This may be due to bladder irritation or infection. Damage to bladder nerves or pelvic muscles, constipation, and certain medicines can also lead to urge incontinence.  Treatment of urinary incontinence depends on the cause. Further evaluation is needed to find the type you have. This will likely include an exam and certain tests. Based on the results, you and your healthcare provider can then plan treatment. Until a diagnosis is made, the home care tips below can help relieve symptoms.  Home care    Do pelvic floor muscle exercises, if they are prescribed. The pelvic floor muscles help support the bladder and urethra. Many women find that their symptoms improve when doing special exercises that strengthen these muscles. To do the exercises contract the muscles you would use to stop your stream of urine, but do this when youre not urinating. Hold for 10 seconds, then relax. Repeat 10 to 20 times in a row, at least 3 times a day. Your provider may give you other instructions for how to do the exercises and how often.    Keep a bladder diary. This helps track how often and how much you urinate over a set period of time. Bring this diary with you to your next visit with the provider. The information can help your provider learn more about your bladder problem.    Lose weight, if advised to by your provider. Excess weight puts pressure on the bladder. Your  provider can help you create a weight-loss plan thats right for you. This may include exercising more and making certain diet changes.    Don't consume foods and drinks that may irritate the bladder. These can include alcohol and caffeinated drinks.    Quit smoking. Smoking and other tobacco use can lead to chronic cough that strains the pelvic floor muscles. Smoking may also damage the bladder and urethra. Talk with your provider about treatments or methods you can use to quit smoking.    If drinking large amounts of fluid causes you to have symptoms, you may be advised to limit your fluid intake. You may also be advised to drink most of your fluids during the day and to limit fluids at night.    If youre worried about urine leakage or accidents, you may wear absorbent pads to catch urine. Change the pads often. This helps reduce discomfort. It may also reduce the risk of skin or bladder infections.  Follow-up care  Follow up with your healthcare provider, or as directed. It may take some to find the right treatment for your problem. Your treatment plan may include special therapies or medicines. Certain procedures or surgery may also be options. Be sure to discuss any questions you have with your provider.  When to seek medical advice  Call the healthcare provider right away if any of these occur:    Fever of 100.4 F (38 C) or higher, or as directed by your provider    Bladder pain or fullness    Abdominal swelling    Nausea or vomiting    Back pain    Weakness, dizziness or fainting  Date Last Reviewed: 10/1/2017    [id number] [employer]. [address] [place] [address], [place] [address]. All rights reserved. This information is not intended as a substitute for professional medical care. Always follow your healthcare professional's instructions.       Advance Directive  Patients advance directive was discussed and I am comfortable with the patients wishes.  Patient Education   Personalized Prevention  Plan  You are due for the preventive services outlined below.  Your care team is available to assist you in scheduling these services.  If you have already completed any of these items, please share that information with your care team to update in your medical record.        Health Maintenance   Topic Date Due   ? HEPATITIS C SCREENING  To check with insurance   ? DXA SCAN  04/30/2020, ordered   ? MAMMOGRAM  07/22/2020, ordered   ? MEDICARE ANNUAL WELLNESS VISIT  Today   ? FALL RISK ASSESSMENT  Steady   ? ADVANCE CARE PLANNING  We would like a copy please   ? LIPID  Today   ? COLORECTAL CANCER SCREENING  03/29/2022   ? TD 18+ HE  05/08/2028   ? Pneumococcal Vaccine: 65+ Years  Completed   ? INFLUENZA VACCINE RULE BASED  Completed   ? ZOSTER VACCINES  Completed   ? Pneumococcal Vaccine: Pediatrics (0 to 5 Years) and At-Risk Patients (6 to 64 Years)  NA   ? HEPATITIS B VACCINES  Completed      Pap-No longer needed

## 2021-06-19 ENCOUNTER — HEALTH MAINTENANCE LETTER (OUTPATIENT)
Age: 71
End: 2021-06-19

## 2021-06-20 NOTE — LETTER
Letter by Francisca Olson MD at      Author: Francisca Olson MD Service: -- Author Type: --    Filed:  Encounter Date: 3/10/2020 Status: (Other)         Tayler Mcmahon  [address]  [place] [address]             March 10, 2020         Dear Ms. Lanreheather,    Below are the results from your recent visit:    Resulted Orders   Echo Complete   Result Value Ref Range    LV volume diastolic 82.2 46 - 106 cm3    LV volume systolic 17.2 14 - 42 cm3    HR 60 bpm    LVOT diam 1.8 cm    LVOT mean gradient 4 mmHg    LVOT peak VTI 33.4 cm    LVOT mean kareem 92.6 cm/s    LVOT peak kareem 145 cm/s    LVOT peak gradient 8 mmHg    MV E' lat kareem 7.34 cm/s    MV E' med kareem 5.33 cm/s    AV mean kareem 98.7 cm/s    AV mean gradient 4 mmHg    AV VTI 33.3 cm    AV peak kareem 149 cm/s    AO root 2.9 cm    AO ascending 3.7 cm    MV decel slope 3,570 mm/s2    MV decel time 243 ms    MV P 1/2 time 71 ms    MV peak A kareem 112 cm/s    MV peak E kareem 86.8 cm/s    TR peak kareem 202 cm/s    BSA 1.84 m2    Hieght 62 in    Weight 2,731.2 lbs    /65 mmHg    TR peak gradent 16.3 mmHg    Echo LVEF calculated 79 55 - 75 %    LA volume 51.0 mL    AV area 2.6 cm2    AV DIM IND kareem 1.0     MV area p 1/2 time 3.1 cm2    MV E/A Ratio 0.8     LVOT area 2.54 cm2    LVOT SV 84.9 cm3    AV peak gradient 8.9 mmHg    LV systolic volume index 9.3 8 - 24 cm3/m2    LV diastolic volume index 44.7 29 - 61 cm3/m2    LA volume index 27.7 mL/m2    LV SVi 46.2 ml/m2    TAPSE 2.3 cm    MV med E/e' ratio 16.3     MV lat E/e' ratio 11.8     LV CO 5.1 l/min    LV Ci 2.8 l/min/m2    LA area 2 16.1 cm2    LA area 1 17.9 cm2    Height 62.0 in    Weight 171 lbs    MV Avg E/e' Ratio 13.7 cm/s    LA length 4.8 cm    AV DIM IND VTI 1.0     Narrative      The calculated left ventricular ejection fraction is 79%. This   represents a normal ejection fraction. Mild concentric hypertrophy noted.    Normal right ventricular size and systolic function.    No hemodynamically  significant valvular heart abnormalities.    When compared to the previous study dated 10/21/2014, no significant   change.          Echo is normal except for mild thickening of heart muscle. No significant change compared to 2014. The heart muscle can thicken when it is working against high blood pressure. Looking at your chart, BP has been either borderline high or mildly elevated at all of your visits since 2018. I recommend you get a home BP cuff and check it regularly. If running >140/90 youshould let us know.   Francisca Olson MD   Family Medicine   [facility]     Please call with questions or contact us using [employer].    Sincerely,        Electronically signed by Francisca Olson MD

## 2021-06-22 NOTE — TELEPHONE ENCOUNTER
Refill Approved    Rx renewed per Medication Renewal Policy. Medication was last renewed on 3/8/18.    Comfort Joyce, Care Connection Triage/Med Refill 1/8/2019     Requested Prescriptions   Pending Prescriptions Disp Refills     omeprazole (PRILOSEC) 20 MG capsule [Pharmacy Med Name: OMEPRAZOLE 20 MG Capsule Delayed Release] 90 capsule 2     Sig: TAKE 1 CAPSULE EVERY DAY BEFORE BREAKFAST    GI Medications Refill Protocol Passed - 1/7/2019 10:27 AM       Passed - PCP or prescribing provider visit in last 12 or next 3 months.    Last office visit with prescriber/PCP: 9/29/2016 Rissa Corral MD OR same dept: 1/12/2018 Shanita Conner MD OR same specialty: 1/12/2018 Shanita Conner MD  Last physical: 8/26/2015 Last MTM visit: Visit date not found   Next visit within 3 mo: Visit date not found  Next physical within 3 mo: Visit date not found  Prescriber OR PCP: Rissa Corral MD  Last diagnosis associated with med order: 1. GERD (gastroesophageal reflux disease)  - omeprazole (PRILOSEC) 20 MG capsule [Pharmacy Med Name: OMEPRAZOLE 20 MG Capsule Delayed Release]; TAKE 1 CAPSULE EVERY DAY BEFORE BREAKFAST  Dispense: 90 capsule; Refill: 2    2. Mood changes  - FLUoxetine (PROZAC) 20 MG capsule [Pharmacy Med Name: FLUOXETINE HCL 20 MG Capsule]; TAKE 1 CAPSULE EVERY DAY  Dispense: 90 capsule; Refill: 2    If protocol passes may refill for 12 months if within 3 months of last provider visit (or a total of 15 months).             FLUoxetine (PROZAC) 20 MG capsule [Pharmacy Med Name: FLUOXETINE HCL 20 MG Capsule] 90 capsule 2     Sig: TAKE 1 CAPSULE EVERY DAY    SSRI Refill Protocol  Passed - 1/7/2019 10:27 AM       Passed - PCP or prescribing provider visit in last year    Last office visit with prescriber/PCP: 9/29/2016 Rissa Corral MD OR same dept: 1/12/2018 Shanita Conner MD OR same specialty: 1/12/2018 Shanita Conner MD  Last physical: 8/26/2015 Last MTM visit: Visit date not found   Next  visit within 3 mo: Visit date not found  Next physical within 3 mo: Visit date not found  Prescriber OR PCP: Rissa Corral MD  Last diagnosis associated with med order: 1. GERD (gastroesophageal reflux disease)  - omeprazole (PRILOSEC) 20 MG capsule [Pharmacy Med Name: OMEPRAZOLE 20 MG Capsule Delayed Release]; TAKE 1 CAPSULE EVERY DAY BEFORE BREAKFAST  Dispense: 90 capsule; Refill: 2    2. Mood changes  - FLUoxetine (PROZAC) 20 MG capsule [Pharmacy Med Name: FLUOXETINE HCL 20 MG Capsule]; TAKE 1 CAPSULE EVERY DAY  Dispense: 90 capsule; Refill: 2    If protocol passes may refill for 12 months if within 3 months of last provider visit (or a total of 15 months).

## 2021-06-25 NOTE — TELEPHONE ENCOUNTER
Refill Approved    Rx renewed per Medication Renewal Policy. Medication was last renewed on 1/8/19.    Last office visit 4/9/18    Florentin Dai, [facility] Connection Triage/Med Refill 3/16/2019     Requested Prescriptions   Pending Prescriptions Disp Refills     omeprazole (PRILOSEC) 20 MG capsule [Pharmacy Med Name: OMEPRAZOLE 20 MG Capsule Delayed Release] 90 capsule 0     Sig: TAKE 1 CAPSULE EVERY DAY BEFORE BREAKFAST    GI Medications Refill Protocol Passed - 3/12/2019  9:47 AM       Passed - PCP or prescribing provider visit in last 12 or next 3 months.    Last office visit with prescriber/PCP: 9/29/2016 Rissa Corral MD OR same dept: Visit date not found OR same specialty: 1/12/2018 Shanita Conner MD  Last physical: 8/26/2015 Last MTM visit: Visit date not found   Next visit within 3 mo: Visit date not found  Next physical within 3 mo: Visit date not found  Prescriber OR PCP: Rissa Corral MD  Last diagnosis associated with med order: 1. GERD (gastroesophageal reflux disease)  - omeprazole (PRILOSEC) 20 MG capsule [Pharmacy Med Name: OMEPRAZOLE 20 MG Capsule Delayed Release]; TAKE 1 CAPSULE EVERY DAY BEFORE BREAKFAST  Dispense: 90 capsule; Refill: 0    2. Mood changes  - FLUoxetine (PROZAC) 20 MG capsule [Pharmacy Med Name: FLUOXETINE HCL 20 MG Capsule]; TAKE 1 CAPSULE EVERY DAY  Dispense: 90 capsule; Refill: 0    If protocol passes may refill for 12 months if within 3 months of last provider visit (or a total of 15 months).             FLUoxetine (PROZAC) 20 MG capsule [Pharmacy Med Name: FLUOXETINE HCL 20 MG Capsule] 90 capsule 0     Sig: TAKE 1 CAPSULE EVERY DAY    SSRI Refill Protocol  Passed - 3/12/2019  9:47 AM       Passed - PCP or prescribing provider visit in last year    Last office visit with prescriber/PCP: 9/29/2016 Rissa Corral MD OR same dept: Visit date not found OR same specialty: 1/12/2018 Shanita Conner MD  Last physical: 8/26/2015 Last MTM visit: Visit date not  found   Next visit within 3 mo: Visit date not found  Next physical within 3 mo: Visit date not found  Prescriber OR PCP: Rissa Corral MD  Last diagnosis associated with med order: 1. GERD (gastroesophageal reflux disease)  - omeprazole (PRILOSEC) 20 MG capsule [Pharmacy Med Name: OMEPRAZOLE 20 MG Capsule Delayed Release]; TAKE 1 CAPSULE EVERY DAY BEFORE BREAKFAST  Dispense: 90 capsule; Refill: 0    2. Mood changes  - FLUoxetine (PROZAC) 20 MG capsule [Pharmacy Med Name: FLUOXETINE HCL 20 MG Capsule]; TAKE 1 CAPSULE EVERY DAY  Dispense: 90 capsule; Refill: 0    If protocol passes may refill for 12 months if within 3 months of last provider visit (or a total of 15 months).

## 2021-06-29 NOTE — PROGRESS NOTES
Progress Notes by Rissa Corral MD at 7/17/2020  9:00 AM     Author: Rissa Corral MD Service: -- Author Type: Physician    Filed: 7/17/2020  9:00 AM Encounter Date: 7/17/2020 Status: Signed    : Rissa Corral MD (Physician)         Assessment:   The encounter diagnosis was Acute UTI (urinary tract infection).     Plan:     Medications Ordered   Medications   ? nitrofurantoin, macrocrystal-monohydrate, (MACROBID) 100 MG capsule     Sig: Take 1 capsule (100 mg total) by mouth 2 (two) times a day for 7 days.     Dispense:  14 capsule     Refill:  0     Patient Instructions       Urinary Tract Infections in Women    Urinary tract infections (UTIs) are most often caused by bacteria. These bacteria enter the urinary tract. The bacteria may come from inside the body. Or they may travel from the skin outside the rectum or vagina into the urethra. Female anatomy makes it easy for bacteria from the bowel to enter a womans urinary tract, which is the most common source of UTI. This means women develop UTIs more often than men. Pain in or around the urinary tract is a common UTI symptom. But the only way to know for sure if you have a UTI for the healthcare provider to test your urine. The two tests that may be done are the urinalysis and urine culture.  Types of UTIs    Cystitis. A bladder infection (cystitis) is the most common UTI in women. You may have urgent or frequent need to pee. You may also have pain, burning when you pee, and bloody urine.    Urethritis. This is an inflamed urethra, which is the tube that carries urine from the bladder to outside the body. You may have lower stomach or back pain. You may also have urgent or frequent need to pee.    Pyelonephritis. This is a kidney infection. If not treated, it can be serious and damage your kidneys. In severe cases, you may need to stay in the hospital. You may have a fever and lower back pain.    Medicines to treat a UTI  Most UTIs are  treated with antibiotics. These kill the bacteria. The length of time you need to take them depends on the type of infection. It may be as short as 3 days. If you have repeated UTIs, you may need a low-dose antibiotic for several months. Take antibiotics exactly as directed. Dont stop taking them until all of the medicine is gone. If you stop taking the antibiotic too soon, the infection may not go away. You may also develop a resistance to the antibiotic. This can make it much harder to treat.  Lifestyle changes to treat and prevent UTIs  The lifestyle changes below will help get rid of your UTI. They may also help prevent future UTIs.    Drink plenty of fluids. This includes water, juice, or other caffeine-free drinks. Fluids help flush bacteria out of your body.    Empty your bladder. Always empty your bladder when you feel the urge to pee. And always pee before going to sleep. Urine that stays in your bladder can lead to infection. Try to pee before and after sex as well.    Practice good personal hygiene. Wipe yourself from front to back after using the toilet. This helps keep bacteria from getting into the urethra.    Use condoms during sex. These help prevent UTIs caused by sexually transmitted bacteria. Also don't use spermicides during sex. These can increase the risk for UTIs. Choose other forms of birth control instead. For women who tend to get UTIs after sex, a low-dose of a preventive antibiotic may be used. Be sure to discuss this option with your healthcare provider.    Follow up with your healthcare provider as directed. He or she may test to make sure the infection has cleared. If needed, more treatment may be started.  Date Last Reviewed: 7/1/2019 2000-2019 [employer]. [address] [place] [address], [place] [address]. All rights reserved. This information is not intended as a substitute for professional medical care. Always follow your healthcare professional's  instructions.          Return for further follow up if needed. Call [phone]) or schedule an appointment via [facility]    Subjective:   Tayler Mcmahon is a 70 y.o. female who submitted an eVisit request for evaluation of her Dysuria.  See the questionnaire and message section of encounter report for information related to history of present illness and review of systems.    The following portions of the patient's history were reviewed and updated as appropriate:  She does not have any pertinent problems on file.  She is allergic to augmentin [amoxicillin-pot clavulanate] and vicryl sutures..     Objective:   No exam performed today, patient submitted as eVisit.  Provider E-Visit time total (minutes): 7

## 2021-07-03 NOTE — ADDENDUM NOTE
Addendum Note by Francisca Olson MD at 2/18/2020  1:00 PM     Author: Francisca Olson MD Service: -- Author Type: Physician    Filed: 2/19/2020  7:53 AM Encounter Date: 2/18/2020 Status: Signed    : Francisca Olson MD (Physician)    Addended by: FRANCISCA OLSON on: 2/19/2020 07:53 AM        Modules accepted: Orders

## 2021-07-21 ENCOUNTER — RECORDS - HEALTHEAST (OUTPATIENT)
Dept: ADMINISTRATIVE | Facility: CLINIC | Age: 71
End: 2021-07-21

## 2021-08-30 ENCOUNTER — TRANSFERRED RECORDS (OUTPATIENT)
Dept: HEALTH INFORMATION MANAGEMENT | Facility: CLINIC | Age: 71
End: 2021-08-30

## 2021-09-13 ENCOUNTER — TRANSFERRED RECORDS (OUTPATIENT)
Dept: HEALTH INFORMATION MANAGEMENT | Facility: CLINIC | Age: 71
End: 2021-09-13

## 2021-09-16 ENCOUNTER — TRANSFERRED RECORDS (OUTPATIENT)
Dept: HEALTH INFORMATION MANAGEMENT | Facility: CLINIC | Age: 71
End: 2021-09-16

## 2021-10-08 ENCOUNTER — TRANSFERRED RECORDS (OUTPATIENT)
Dept: HEALTH INFORMATION MANAGEMENT | Facility: CLINIC | Age: 71
End: 2021-10-08

## 2021-10-09 ENCOUNTER — HEALTH MAINTENANCE LETTER (OUTPATIENT)
Age: 71
End: 2021-10-09

## 2021-10-20 ENCOUNTER — OFFICE VISIT (OUTPATIENT)
Dept: FAMILY MEDICINE | Facility: CLINIC | Age: 71
End: 2021-10-20
Payer: COMMERCIAL

## 2021-10-20 VITALS
BODY MASS INDEX: 31.51 KG/M2 | HEIGHT: 62 IN | DIASTOLIC BLOOD PRESSURE: 73 MMHG | HEART RATE: 60 BPM | WEIGHT: 171.25 LBS | SYSTOLIC BLOOD PRESSURE: 139 MMHG

## 2021-10-20 DIAGNOSIS — Z11.59 NEED FOR HEPATITIS C SCREENING TEST: ICD-10-CM

## 2021-10-20 DIAGNOSIS — E78.5 HYPERLIPIDEMIA LDL GOAL <130: ICD-10-CM

## 2021-10-20 DIAGNOSIS — E55.9 VITAMIN D DEFICIENCY: ICD-10-CM

## 2021-10-20 DIAGNOSIS — R05.3 CHRONIC COUGH: ICD-10-CM

## 2021-10-20 DIAGNOSIS — G47.30 SLEEP APNEA, UNSPECIFIED TYPE: ICD-10-CM

## 2021-10-20 DIAGNOSIS — K21.00 GASTROESOPHAGEAL REFLUX DISEASE WITH ESOPHAGITIS WITHOUT HEMORRHAGE: ICD-10-CM

## 2021-10-20 DIAGNOSIS — R45.86 MOOD CHANGES: ICD-10-CM

## 2021-10-20 DIAGNOSIS — Z12.11 SCREENING FOR COLON CANCER: ICD-10-CM

## 2021-10-20 DIAGNOSIS — Z00.00 ENCOUNTER FOR MEDICARE ANNUAL WELLNESS EXAM: Primary | ICD-10-CM

## 2021-10-20 DIAGNOSIS — H90.3 SENSORINEURAL HEARING LOSS, BILATERAL: ICD-10-CM

## 2021-10-20 DIAGNOSIS — R91.8 PULMONARY NODULES: ICD-10-CM

## 2021-10-20 DIAGNOSIS — I77.810 DILATION OF THORACIC AORTA (H): ICD-10-CM

## 2021-10-20 DIAGNOSIS — M89.9 DISORDER OF BONE AND CARTILAGE: ICD-10-CM

## 2021-10-20 DIAGNOSIS — F32.A DEPRESSION, UNSPECIFIED DEPRESSION TYPE: ICD-10-CM

## 2021-10-20 DIAGNOSIS — M94.9 DISORDER OF BONE AND CARTILAGE: ICD-10-CM

## 2021-10-20 PROBLEM — M72.2 PLANTAR FASCIITIS: Status: ACTIVE | Noted: 2021-10-20

## 2021-10-20 LAB
ALBUMIN SERPL-MCNC: 3.8 G/DL (ref 3.5–5)
ALP SERPL-CCNC: 67 U/L (ref 45–120)
ALT SERPL W P-5'-P-CCNC: 18 U/L (ref 0–45)
ANION GAP SERPL CALCULATED.3IONS-SCNC: 7 MMOL/L (ref 5–18)
AST SERPL W P-5'-P-CCNC: 17 U/L (ref 0–40)
BILIRUB SERPL-MCNC: 0.7 MG/DL (ref 0–1)
BUN SERPL-MCNC: 22 MG/DL (ref 8–28)
CALCIUM SERPL-MCNC: 9.6 MG/DL (ref 8.5–10.5)
CHLORIDE BLD-SCNC: 102 MMOL/L (ref 98–107)
CHOLEST SERPL-MCNC: 226 MG/DL
CO2 SERPL-SCNC: 29 MMOL/L (ref 22–31)
CREAT SERPL-MCNC: 0.86 MG/DL (ref 0.6–1.1)
ERYTHROCYTE [DISTWIDTH] IN BLOOD BY AUTOMATED COUNT: 12.3 % (ref 10–15)
FASTING STATUS PATIENT QL REPORTED: YES
GFR SERPL CREATININE-BSD FRML MDRD: 68 ML/MIN/1.73M2
GLUCOSE BLD-MCNC: 103 MG/DL (ref 70–125)
HCT VFR BLD AUTO: 40.9 % (ref 35–47)
HDLC SERPL-MCNC: 52 MG/DL
HGB BLD-MCNC: 14 G/DL (ref 11.7–15.7)
LDLC SERPL CALC-MCNC: 145 MG/DL
MAGNESIUM SERPL-MCNC: 1.8 MG/DL (ref 1.8–2.6)
MCH RBC QN AUTO: 32.5 PG (ref 26.5–33)
MCHC RBC AUTO-ENTMCNC: 34.2 G/DL (ref 31.5–36.5)
MCV RBC AUTO: 95 FL (ref 78–100)
PLATELET # BLD AUTO: 197 10E3/UL (ref 150–450)
POTASSIUM BLD-SCNC: 4.3 MMOL/L (ref 3.5–5)
PROT SERPL-MCNC: 6.7 G/DL (ref 6–8)
RBC # BLD AUTO: 4.31 10E6/UL (ref 3.8–5.2)
SODIUM SERPL-SCNC: 138 MMOL/L (ref 136–145)
TRIGL SERPL-MCNC: 144 MG/DL
WBC # BLD AUTO: 4.7 10E3/UL (ref 4–11)

## 2021-10-20 PROCEDURE — 85027 COMPLETE CBC AUTOMATED: CPT | Performed by: FAMILY MEDICINE

## 2021-10-20 PROCEDURE — 83735 ASSAY OF MAGNESIUM: CPT | Performed by: FAMILY MEDICINE

## 2021-10-20 PROCEDURE — 82306 VITAMIN D 25 HYDROXY: CPT | Performed by: FAMILY MEDICINE

## 2021-10-20 PROCEDURE — 99397 PER PM REEVAL EST PAT 65+ YR: CPT | Performed by: FAMILY MEDICINE

## 2021-10-20 PROCEDURE — 99214 OFFICE O/P EST MOD 30 MIN: CPT | Mod: 25 | Performed by: FAMILY MEDICINE

## 2021-10-20 PROCEDURE — 36415 COLL VENOUS BLD VENIPUNCTURE: CPT | Performed by: FAMILY MEDICINE

## 2021-10-20 PROCEDURE — 80061 LIPID PANEL: CPT | Performed by: FAMILY MEDICINE

## 2021-10-20 PROCEDURE — 80053 COMPREHEN METABOLIC PANEL: CPT | Performed by: FAMILY MEDICINE

## 2021-10-20 PROCEDURE — 86803 HEPATITIS C AB TEST: CPT | Performed by: FAMILY MEDICINE

## 2021-10-20 RX ORDER — FLUOXETINE 10 MG/1
10 CAPSULE ORAL DAILY
Qty: 90 CAPSULE | Refills: 3 | Status: CANCELLED | OUTPATIENT
Start: 2021-10-20

## 2021-10-20 RX ORDER — FAMOTIDINE 20 MG/1
20 TABLET, FILM COATED ORAL 2 TIMES DAILY
Qty: 180 TABLET | Refills: 3 | Status: SHIPPED | OUTPATIENT
Start: 2021-10-20 | End: 2022-04-16

## 2021-10-20 RX ORDER — OMEPRAZOLE 40 MG/1
CAPSULE, DELAYED RELEASE ORAL
Qty: 90 CAPSULE | Refills: 3 | Status: CANCELLED | OUTPATIENT
Start: 2021-10-20

## 2021-10-20 ASSESSMENT — PATIENT HEALTH QUESTIONNAIRE - PHQ9: SUM OF ALL RESPONSES TO PHQ QUESTIONS 1-9: 0

## 2021-10-20 ASSESSMENT — ACTIVITIES OF DAILY LIVING (ADL): CURRENT_FUNCTION: NO ASSISTANCE NEEDED

## 2021-10-20 ASSESSMENT — MIFFLIN-ST. JEOR: SCORE: 1240.03

## 2021-10-20 NOTE — PROGRESS NOTES
"SUBJECTIVE:   Tayler Mcmahon is a 71 year old female who presents for Preventive Visit.      Patient has been advised of split billing requirements and indicates understanding: Yes   Are you in the first 12 months of your Medicare coverage?  No    Healthy Habits:     In general, how would you rate your overall health?  Good    Frequency of exercise:  4-5 days/week    Duration of exercise:  45-60 minutes    Do you usually eat at least 4 servings of fruit and vegetables a day, include whole grains    & fiber and avoid regularly eating high fat or \"junk\" foods?  No    Taking medications regularly:  Yes    Medication side effects:  Not applicable    Ability to successfully perform activities of daily living:  No assistance needed    Home Safety:  Lack of grab bars in the bathroom    Hearing Impairment:  Difficulty following a conversation in a noisy restaurant or crowded room and need to ask people to speak up or repeat themselves    In the past 6 months, have you been bothered by leaking of urine? Yes    In general, how would you rate your overall mental or emotional health?  Good      PHQ-2 Total Score: 0    Additional concerns today:  No    HPI:  Depression:  Recent increase of Prozac from 20 to 30 mg and feeling better.    GERD: Patient has been on omeprazole 20 mg daily.  She did see ENT who thought possibly her chronic cough was due to acid reflux and they increased her omeprazole to 40 mg daily.  She thinks has helped the cough some but worried about being on the higher dose.  Denies dysphagia or acid reflux symptoms.  ENT also recommended an endoscopy.  Patient wonders if she can do this at the same time as her colonoscopy in March.    Chronic cough: This has been chronic and worked up by pulmonary.    Pulmonary nodules: Recent CT from pulmonary showed the small nodules have been stable for 2 years.         Possible hemorrhoid:  Tiny bumps at outer anus. Will have a smear of stolol on her pad.  Some incontinence " of stool with coughing.     Dilated thoracic aorta: This was on her problem list and likely from a past echo.  Her most recent echo did not comment on the thoracic aorta so we will check with cardiology about this to see if this is stable.            Health Maintenance   Topic Date Due     ANNUAL REVIEW OF HM ORDERS  Today     HEPATITIS C SCREENING  Today     PHQ-9  Today     FALL RISK ASSESSMENT  Today     COLORECTAL CANCER SCREENING  03/29/2022, ordered     MEDICARE ANNUAL WELLNESS VISIT  Today     MAMMO SCREENING  04/16/2022     LIPID  Today     ADVANCE CARE PLANNING  We would like a copy please     DTAP/TDAP/TD IMMUNIZATION (3 - Td or Tdap) 05/08/2028     DEXA  04/16/2023     INFLUENZA VACCINE  Completed     Pneumococcal Vaccine: 65+ Years  Completed     ZOSTER IMMUNIZATION  Completed     COVID-19 Vaccine  Completed     IPV IMMUNIZATION  Aged Out     MENINGITIS IMMUNIZATION  Aged Out     HEPATITIS B IMMUNIZATION  Completed       Do you feel safe in your environment? Yes    Have you ever done Advance Care Planning? (For example, a Health Directive, POLST, or a discussion with a medical provider or your loved ones about your wishes): Yes, patient states has an Advance Care Planning document and will bring a copy to the clinic.       Fall risk  Fallen 2 or more times in the past year?: No  Any fall with injury in the past year?: No  now  Cognitive Screening   1) Repeat 3 items (Leader, Season, Table)    2) Clock draw: NORMAL  3) 3 item recall: Recalls 3 objects  Results: NORMAL clock, 1-2 items recalled: COGNITIVE IMPAIRMENT LESS LIKELY    Mini-CogTM Copyright [employer]. Licensed by the author for use in [facility]; reprinted with permission (mason@.[facility]). All rights reserved.      Do you have sleep apnea, excessive snoring or daytime drowsiness?: yes, On CPAP    Reviewed and updated as needed this visit by clinical staff  Tobacco  Allergies  Meds              Reviewed and updated as needed this  "visit by Provider                Social History     Tobacco Use     Smoking status: Never Smoker     Smokeless tobacco: Never Used   Substance Use Topics     Alcohol use: No     If you drink alcohol do you typically have >3 drinks per day or >7 drinks per week? No     Alcohol Use 10/20/2021   Prescreen: >3 drinks/day or >7 drinks/week? No   Prescreen: >3 drinks/day or >7 drinks/week? -               Current providers sharing in care for this patient include:   Patient Care Team:  [facility]CP - Rissa Fleming MD as Assigned PCP    The following health maintenance items are reviewed in Epic and correct as of today:  Health Maintenance Due   Topic Date Due     ANNUAL REVIEW OF  ORDERS  Never done     HEPATITIS C SCREENING  Never done     PHQ-9  04/19/2021     FALL RISK ASSESSMENT  10/19/2021     Lab work is in process  See orders        Review of Systems  Constitutional, HEENT, cardiovascular, pulmonary, gi and gu systems are negative, except as otherwise noted.    OBJECTIVE:   /73   Pulse 60   Ht 1.575 m (5' 2\")   Wt 77.7 kg (171 lb 4 oz)   LMP  (LMP Unknown)   BMI 31.32 kg/m   Estimated body mass index is 31.32 kg/m  as calculated from the following:    Height as of this encounter: 1.575 m (5' 2\").    Weight as of this encounter: 77.7 kg (171 lb 4 oz).  Physical Exam  GENERAL: healthy, alert and no distress  EYES: Eyes grossly normal to inspection, PERRL and conjunctivae and sclerae normal  HENT: ear canals and TM's normal, nose and mouth without ulcers or lesions  NECK: no adenopathy, no asymmetry, masses, or scars and thyroid normal to palpation  RESP: lungs clear to auscultation - no rales, rhonchi or wheezes  BREAST: normal without masses, tenderness or nipple discharge and no palpable axillary masses or adenopathy  CV: regular rate and rhythm, normal S1 S2, no S3 or S4, no murmur, click or rub, no peripheral edema and peripheral pulses strong  ABDOMEN: " soft, nontender, no hepatosplenomegaly, no masses and bowel sounds normal   (female): normal female external genitalia, normal urethral meatus, vaginal mucosa pink, moist, well rugated, and normal cervix/adnexa/uterus without masses or discharge  RECTAL: normal sphincter tone, no rectal masses, except what appears to be multiple small external hemorrhoids that are not bleeding or thrombosed  MS: no gross musculoskeletal defects noted, no edema  SKIN: no suspicious lesions or rashes  NEURO: Normal strength and tone, mentation intact and speech normal  PSYCH: mentation appears normal, affect normal/bright    Diagnostic Test Results:  Labs reviewed in Epic    ASSESSMENT / PLAN:       ICD-10-CM    1. Encounter for Medicare annual wellness exam  Z00.00    2. Gastroesophageal reflux disease with esophagitis without hemorrhage  K21.00 Adult Gastro Ref - Procedure Only     omeprazole (PRILOSEC) 20 MG DR capsule     famotidine (PEPCID) 20 MG tablet   3. Mood changes  R45.86    4. Depression, unspecified depression type  F32.A    5. Need for hepatitis C screening test  Z11.59 Hepatitis C antibody     Hepatitis C antibody   6. Dilation of thoracic aorta (H)  I77.810    7. Screening for colon cancer  Z12.11 Adult Gastro Ref - Procedure Only   8. Sleep apnea, unspecified type  G47.30    9. Chronic cough  R05.3    10. Pulmonary nodules  R91.8    11. Vitamin D deficiency  E55.9 Vitamin D Deficiency     Vitamin D Deficiency   12. Osteopenia  M89.9 Magnesium    M94.9 Magnesium   13. Hyperlipidemia LDL goal <130  E78.5 Comprehensive metabolic panel (BMP + Alb, Alk Phos, ALT, AST, Total. Bili, TP)     CBC with platelets     Lipid panel reflex to direct LDL Fasting     Comprehensive metabolic panel (BMP + Alb, Alk Phos, ALT, AST, Total. Bili, TP)     CBC with platelets     Lipid panel reflex to direct LDL Fasting   14. Sensorineural hearing loss, bilateral  H90.3      Ordered colonoscopy that will be due in March 2022 with [facility].  Also ordered upper endoscopy to be done in March with the colonoscopy.  You will need to call [facility].    We printed your chest CT from this fall.  Pulmonary nodules are stable.  And mention possible pulmonary arterial hypertension.  Please discuss this with your lung doctor.    Also if the cough is persisting after treatment for reflux please discuss that with your lung doctor as well.    Because there is a risk of losing bone mass with higher doses of omeprazole I want you to decrease that back down to 20 mg daily.  If you are having concerns of acid reflux or cough returns you could add Pepcid 20 mg at bedtime and if that is not enough added in the morning as well.  Then certainly if the cough persists I want you to see pulmonary.    On CPAP.    Since your ENT had mentioned an endoscopy and with your history of reflux and cough I am ordering an endoscopy which is a camera study to the stomach.  This can be done in March with your colonoscopy.  Saw ENT for hearing loss and hearing aids.    Since you are having some incontinence of stool that can be overflow possibly because of constipati  Recommend fiber supplement like Metamucil schedule Citrucel or Benefiber every night and if it is persisting then twice a day.  If the stool incontinence persists after the fiber supplement let me know and I will refer to a colorectal surgeon.    You had a heart ultrasound in 2020.  I will have somebody contact cardiology and see if they can comment on the thoracic aorta to see if it is dilated as that had been mentioned in your chart.  We will then contact you with those results.    Please see dermatology yearly for full body skin checks.    It looks like you do have a few external hemorrhoids.  If they bother you you could use Preparation H or Tucks pads.  Certainly if you feel like anything is growing or bleeding I want you to see a colorectal surgeon.  Please also ask the dermatologist to look at your outer rectal  "area to ensure this does not look like a skin cancer.      Patient has been advised of split billing requirements and indicates understanding: Yes  COUNSELING:  Reviewed preventive health counseling, as reflected in patient instructions       Regular exercise       Healthy diet/nutrition    Estimated body mass index is 31.32 kg/m  as calculated from the following:    Height as of this encounter: 1.575 m (5' 2\").    Weight as of this encounter: 77.7 kg (171 lb 4 oz).    Weight management plan: Discussed healthy diet and exercise guidelines    She reports that she has never smoked. She has never used smokeless tobacco.      Appropriate preventive services were discussed with this patient, including applicable screening as appropriate for cardiovascular disease, diabetes, osteopenia/osteoporosis, and glaucoma.  As appropriate for age/gender, discussed screening for colorectal cancer, prostate cancer, breast cancer, and cervical cancer. Checklist reviewing preventive services available has been given to the patient.    Reviewed patients plan of care and provided an AVS. The Basic Care Plan (routine screening as documented in Health Maintenance) for Tayler meets the Care Plan requirement. This Care Plan has been established and reviewed with the Patient.    Counseling Resources:  ATP IV Guidelines  Pooled Cohorts Equation Calculator  Breast Cancer Risk Calculator  Breast Cancer: Medication to Reduce Risk  FRAX Risk Assessment  ICSI Preventive Guidelines  Dietary Guidelines for Americans, 2010  [employer]'s MyPlate  ASA Prophylaxis  Lung CA Screening    Rissa Corral MD  [facility]    Identified Health Risks:  "

## 2021-10-20 NOTE — PATIENT INSTRUCTIONS
Ordered colonoscopy that will be due in March 2022 with [facility].  Also ordered upper endoscopy to be done in March with the colonoscopy.  You will need to call [facility].    We printed your chest CT from this fall.  Pulmonary nodules are stable.  And mention possible pulmonary arterial hypertension.  Please discuss this with your lung doctor.    Also if the cough is persisting after treatment for reflux please discuss that with your lung doctor as well.    Because there is a risk of losing bone mass with higher doses of omeprazole I want you to decrease that back down to 20 mg daily.  If you are having concerns of acid reflux or cough returns you could add Pepcid 20 mg at bedtime and if that is not enough added in the morning as well.  Then certainly if the cough persists I want you to see pulmonary.    On CPAP.    Since your ENT had mentioned an endoscopy and with your history of reflux and cough I am ordering an endoscopy which is a camera study to the stomach.  This can be done in March with your colonoscopy.    Since you are having some incontinence of stool that can be overflow possibly because of constipati  Recommend fiber supplement like Metamucil schedule Citrucel or Benefiber every night and if it is persisting then twice a day.  If the stool incontinence persists after the fiber supplement let me know and I will refer to a colorectal surgeon.    You had a heart ultrasound in 2020.  I will have somebody contact cardiology and see if they can comment on the thoracic aorta to see if it is dilated as that had been mentioned in your chart.  We will then contact you with those results.    Please see dermatology yearly for full body skin checks.    It looks like you do have a few external hemorrhoids.  If they bother you you could use Preparation H or Tucks pads.  Certainly if you feel like anything is growing or bleeding I want you to see a colorectal surgeon.  Please also ask the dermatologist to look  at your outer rectal area to ensure this does not look like a skin cancer.        Understanding USDA MyPlate  The USDA has guidelines to help you make healthy food choices. These are called MyPlate. MyPlate shows the food groups that make up healthy meals using the image of a place setting. Before you eat, think about the healthiest choices for what to put on your plate or in your cup or bowl. To learn more about building a healthy plate, visit www.choosemyplate.gov.    The food groups    Fruits. Any fruit or 100% fruit juice counts as part of the Fruit Group. Fruits may be fresh, canned, frozen, or dried, and may be whole, cut-up, or pureed. Make 1/2 of your plate fruits and vegetables.    Vegetables. Any vegetable or 100% vegetable juice counts as a member of the Vegetable Group. Vegetables may be fresh, frozen, canned, or dried. They can be served raw or cooked and may be whole, cut-up, or mashed. Make 1/2 of your plate fruits and vegetables.    Grains. All foods made from grains are part of the Grains Group. These include wheat, rice, oats, cornmeal, and barley. Grains are often used to make foods such as bread, pasta, oatmeal, cereal, tortillas, and grits. Grains should be no more than 1/4 of your plate. At least half of your grains should be whole grains.    Protein. This group includes meat, poultry, seafood, beans and peas, eggs, processed soy products (such as tofu), nuts (including nut butters), and seeds. Make protein choices no more than 1/4 of your plate. Meat and poultry choices should be lean or low fat.    Dairy. The Dairy Group includes all fluid milk products and foods made from milk that contain calcium, such as yogurt and cheese. (Foods that have little calcium, such as cream, butter, and cream cheese, are not part of this group.) Most dairy choices should be low-fat or fat-free.    Oils. Oils aren't a food group, but they do contain essential nutrients. However it's important to watch your  intake of oils. These are fats that are liquid at room temperature. They include canola, corn, olive, soybean, vegetable, and sunflower oil. Foods that are mainly oil include mayonnaise, certain salad dressings, and soft margarines. You likely already get your daily oil allowance from the foods you eat.  Things to limit  Eating healthy also means limiting these things in your diet:       Salt (sodium). Many processed foods have a lot of sodium. To keep sodium intake down, eat fresh vegetables, meats, poultry, and seafood when possible. Purchase low-sodium, reduced-sodium, or no-salt-added food products at the store. And don't add salt to your meals at home. Instead, season them with herbs and spices such as dill, oregano, cumin, and paprika. Or try adding flavor with lemon or lime zest and juice.    Saturated fat. Saturated fats are most often found in animal products such as beef, pork, and chicken. They are often solid at room temperature, such as butter. To reduce your saturated fat intake, choose leaner cuts of meat and poultry. And try healthier cooking methods such as grilling, broiling, roasting, or baking. For a simple lower-fat swap, use plain nonfat yogurt instead of mayonnaise when making potato salad or macaroni salad.    Added sugars. These are sugars added to foods. They are in foods such as ice cream, candy, soda, fruit drinks, sports drinks, energy drinks, cookies, pastries, jams, and syrups. Cut down on added sugars by sharing sweet treats with a family member or friend. You can also choose fruit for dessert, and drink water or other unsweetened beverages.     [employer] last reviewed this educational content on 6/1/2020 2000-2021 [employer]. All rights reserved. This information is not intended as a substitute for professional medical care. Always follow your healthcare professional's instructions.          Signs of Hearing Loss      Hearing much better with one ear can be a sign of  hearing loss.   Hearing loss is a problem shared by many people. In fact, it is one of the most common health problems, particularly as people age. Most people age 65 and older have some hearing loss. By age 80, almost everyone does. Hearing loss often occurs slowly over the years. So you may not realize your hearing has gotten worse.  Have your hearing checked  Call your healthcare provider if you:    Have to strain to hear normal conversation    Have to watch other people s faces very carefully to follow what they re saying    Need to ask people to repeat what they ve said    Often misunderstand what people are saying    Turn the volume of the television or radio up so high that others complain    Feel that people are mumbling when they re talking to you    Find that the effort to hear leaves you feeling tired and irritated    Notice, when using the phone, that you hear better with one ear than the other  [employer] last reviewed this educational content on 1/1/2020 2000-2021 [employer]. All rights reserved. This information is not intended as a substitute for professional medical care. Always follow your healthcare professional's instructions.          Urinary Incontinence, Female (Adult)   Urinary incontinence means loss of bladder control. This problem affects many women, especially as they get older. If you have incontinence, you may be embarrassed to ask for help. But know that this problem can be treated.   Types of Incontinence  There are different types of incontinence. Two of the main types are described here. You can have more than one type.     Stress incontinence. With this type, urine leaks when pressure (stress) is put on the bladder. This may happen when you cough, sneeze, or laugh. Stress incontinence most often occurs because the pelvic floor muscles that support the bladder and urethra are weak. This can happen after pregnancy and vaginal childbirth or a hysterectomy. It can also  be due to excess body weight or hormone changes.    Urge incontinence (also called overactive bladder). With this type, a sudden urge to urinate is felt often. This may happen even though there may not be much urine in the bladder. The need to urinate often during the night is common. Urge incontinence most often occurs because of bladder spasms. This may be due to bladder irritation or infection. Damage to bladder nerves or pelvic muscles, constipation, and certain medicines can also lead to urge incontinence.  Treatment depends on the cause. Further evaluation is needed to find the type you have. This will likely include an exam and certain tests. Based on the results, you and your healthcare provider can then plan treatment. Until a diagnosis is made, the home care tips below can help ease symptoms.   Home care    Do pelvic floor muscle exercises, if they are prescribed. The pelvic floor muscles help support the bladder and urethra. Many women find that their symptoms improve when doing special exercises that strengthen these muscles. To do the exercises, contract the muscles you would use to stop your stream of urine. But do this when you re not urinating. Hold for 10 seconds, then relax. Repeat 10 to 20 times in a row, at least 3 times a day. Your healthcare provider may give you other instructions for how to do the exercises and how often.    Keep a bladder diary. This helps track how often and how much you urinate over a set period of time. Bring this diary with you to your next visit with the provider. The information can help your provider learn more about your bladder problem.    Lose weight, if advised to by your provider. Extra weight puts pressure on the bladder. Your provider can help you create a weight-loss plan that s right for you. This may include exercising more and making certain diet changes.    Don't have foods and drinks that may irritate the bladder. These can include alcohol and caffeinated  drinks.    Quit smoking. Smoking and other tobacco use can lead to a long-term (chronic) cough that strains the pelvic floor muscles. Smoking may also damage the bladder and urethra. Talk with your provider about treatments or methods you can use to quit smoking.    If drinking large amounts of fluid makes you have symptoms, you may be advised to limit your fluid intake. You may also be advised to drink most of your fluids during the day and to limit fluids at night.    If you re worried about urine leakage or accidents, you may wear absorbent pads to catch urine. Change the pads often. This helps reduce discomfort. It may also reduce the risk of skin or bladder infections.    Follow-up care  Follow up with your healthcare provider, or as directed. It may take some to find the right treatment for your problem. But healthy lifestyle changes can be made right away. These include such things as exercising on a regular basis, eating a healthy diet, losing weight (if needed), and quitting smoking. Your treatment plan may include special therapies or medicines. Certain procedures or surgery may also be options. Talk about any questions you have with your provider.   When to seek medical advice  Call the healthcare provider right away if any of these occur:    Fever of 100.4 F (38 C) or higher, or as directed by your provider    Bladder pain or fullness    Belly swelling    Nausea or vomiting    Back pain    Weakness, dizziness, or fainting  [employer] last reviewed this educational content on 1/1/2020 2000-2021 [employer]. All rights reserved. This information is not intended as a substitute for professional medical care. Always follow your healthcare professional's instructions.                Health Maintenance   Topic Date Due     ANNUAL REVIEW OF HM ORDERS  Today     HEPATITIS C SCREENING  Today     PHQ-9  Today     FALL RISK ASSESSMENT  Today     COLORECTAL CANCER SCREENING  03/29/2022, ordered      MEDICARE ANNUAL WELLNESS VISIT  Today     MAMMO SCREENING  04/16/2022     LIPID  Today     ADVANCE CARE PLANNING  We would like a copy please     DTAP/TDAP/TD IMMUNIZATION (3 - Td or Tdap) 05/08/2028     DEXA  04/16/2023     INFLUENZA VACCINE  Completed     Pneumococcal Vaccine: 65+ Years  Completed     ZOSTER IMMUNIZATION  Completed     COVID-19 Vaccine  Completed     IPV IMMUNIZATION  Aged Out     MENINGITIS IMMUNIZATION  Aged Out     HEPATITIS B IMMUNIZATION  Completed

## 2021-10-21 LAB
DEPRECATED CALCIDIOL+CALCIFEROL SERPL-MC: 47 UG/L (ref 30–80)
HCV AB SERPL QL IA: NEGATIVE

## 2021-10-22 ENCOUNTER — TELEPHONE (OUTPATIENT)
Dept: FAMILY MEDICINE | Facility: CLINIC | Age: 71
End: 2021-10-22

## 2021-10-22 NOTE — TELEPHONE ENCOUNTER
Dr. Haque,  I saw her recently for her physical and dilated thoracic aorta was on her problem list.  I looked at her echo from 2020 and there is no comment on this.  Are you able to see the thoracic aorta on her echo?  Can you comment if it is enlarged?  If not I will talk to radiology as she recently had a chest CT but it was done without contrast.  Thank you,  Aidee Corral, Family Med

## 2021-10-25 NOTE — TELEPHONE ENCOUNTER
Please call patient or send her a [employer] message.  I talked to cardiology and they were not able to assess the thoracic aorta on the echo.  I did speak to radiology and they were able to assess this on her chest CT from 2017 to 2021.  It does appear stable.    Dilation of thoracic aorta (H)-borderline dilated-2017 was 3.6 x 4 cm, 2021 was 3.5 x 3.8 cm, so stable on chest CT

## 2022-02-18 DIAGNOSIS — F32.A DEPRESSION, UNSPECIFIED DEPRESSION TYPE: ICD-10-CM

## 2022-02-18 RX ORDER — FLUOXETINE 10 MG/1
CAPSULE ORAL
Qty: 90 CAPSULE | Refills: 3 | Status: SHIPPED | OUTPATIENT
Start: 2022-02-18 | End: 2023-03-17

## 2022-03-15 ENCOUNTER — TRANSFERRED RECORDS (OUTPATIENT)
Dept: HEALTH INFORMATION MANAGEMENT | Facility: CLINIC | Age: 72
End: 2022-03-15
Payer: COMMERCIAL

## 2022-04-26 ENCOUNTER — TRANSFERRED RECORDS (OUTPATIENT)
Dept: HEALTH INFORMATION MANAGEMENT | Facility: CLINIC | Age: 72
End: 2022-04-26
Payer: COMMERCIAL

## 2022-05-14 ENCOUNTER — HOSPITAL ENCOUNTER (EMERGENCY)
Facility: CLINIC | Age: 72
Discharge: HOME | End: 2022-05-14
Attending: PHYSICIAN ASSISTANT | Admitting: PHYSICIAN ASSISTANT
Payer: COMMERCIAL

## 2022-05-14 VITALS
HEART RATE: 65 BPM | DIASTOLIC BLOOD PRESSURE: 85 MMHG | RESPIRATION RATE: 18 BRPM | HEIGHT: 62 IN | BODY MASS INDEX: 31.28 KG/M2 | SYSTOLIC BLOOD PRESSURE: 146 MMHG | OXYGEN SATURATION: 95 % | TEMPERATURE: 97.5 F | WEIGHT: 170 LBS

## 2022-05-14 DIAGNOSIS — L72.0 EPIDERMOID CYST OF SKIN: ICD-10-CM

## 2022-05-14 PROCEDURE — 99213 OFFICE O/P EST LOW 20 MIN: CPT | Mod: 25 | Performed by: PHYSICIAN ASSISTANT

## 2022-05-14 PROCEDURE — 10060 I&D ABSCESS SIMPLE/SINGLE: CPT | Performed by: PHYSICIAN ASSISTANT

## 2022-05-14 PROCEDURE — G0463 HOSPITAL OUTPT CLINIC VISIT: HCPCS | Mod: 25 | Performed by: PHYSICIAN ASSISTANT

## 2022-05-14 RX ORDER — CEPHALEXIN 500 MG/1
500 CAPSULE ORAL 4 TIMES DAILY
Qty: 28 CAPSULE | Refills: 0 | Status: SHIPPED | OUTPATIENT
Start: 2022-05-14 | End: 2022-05-21

## 2022-05-14 NOTE — ED PROVIDER NOTES
History     Chief Complaint   Patient presents with     Cyst     HPI  Tayler Mcmahon is a 72 year old female who presents the urgent care with concern over a possible cyst/abscess.  Patient reports that she noted that appeared to be a blackhead on the left upper aspect of her abdomen approximately 10 days ago.  She attempted to drain with direct palpation.  Since then she has developed increasing erythema, swelling, tenderness palpation.  She has not had any further discharge or drainage in the wound.  She denies any fever, chills, myalgias, cough, chest pain, dyspnea, wheezing, nausea, vomiting, generalized abdominal pain or diarrhea.  She denies any history of MRSA.    Allergies:  Allergies   Allergen Reactions     Augmentin      Suture      vicral     Problem List:    Patient Active Problem List    Diagnosis Date Noted     Dilation of thoracic aorta (H)-borderline dilated-2017 was 3.6 x 4 cm, 2021 was 3.5 x 3.8 cm, so stable on chest CT 10/20/2021     Priority: Medium     Plantar fasciitis 10/20/2021     Priority: Medium     Formatting of this note might be different from the original.  Created by Conversion       Hyperlipidemia LDL goal <130 10/20/2021     Priority: Medium     Sensorineural hearing loss, bilateral 10/20/2021     Priority: Medium     Chronic cough 05/02/2019     Priority: Medium     Pulmonary nodules 05/02/2019     Priority: Medium     Furuncle      Priority: Medium     Created by Conversion  Replacement Utility updated for latest IMO load         Ptosis Of Eyelid      Priority: Medium     Created by Conversion  [facility] Annotation: Aug 18 2008  7:05PM - Rissa Corral: Right  Replacement Utility updated for latest IMO load         Adult Sleep Apnea      Priority: Medium     Created by Conversion  [facility] Annotation: Aug 20 2008  8:20AM - Rissa Corral: On CPAP  Replacement Utility updated for latest IMO load         Osteopenia      Priority: Medium     Created by  Conversion  Replacement Utility updated for latest IMO load         Pelvic Pain      Priority: Medium     Created by Conversion  [facility] Annotation: Aug 14 2014 11:31AM Rissa Garibay: Also,   thickened endometrium on US. T see GYN for possible endometrial biopsy.  Replacement Utility updated for latest IMO load         PAC (premature atrial contraction) 09/09/2015     Priority: Medium     Diastolic dysfunction 08/26/2015     Priority: Medium     Vitamin D deficiency 08/26/2015     Priority: Medium     Plantar Fasciitis      Priority: Medium     Created by Conversion         Imaging Studies Nonspecific Abnormal Findings Genitourinary      Priority: Medium     Created by Conversion         Chronic Reflux Esophagitis      Priority: Medium     Created by Conversion         Subacromial Bursitis On The Left      Priority: Medium     Created by Conversion         Prediabetes      Priority: Medium     Created by Conversion            Past Medical History:    Past Medical History:   Diagnosis Date     Appendicitis 11/17/2016     Depression      Diverticulosis      Fibrocystic breast      HLD (hyperlipidemia)      Inverted nipple      PONV (postoperative nausea and vomiting)      Past Surgical History:    Past Surgical History:   Procedure Laterality Date     ARTHROSCOPY KNEE Left 10/23/2006    Dr. Calvo at [place]     BELPHAROPTOSIS REPAIR Left      HYSTEROSCOPY DIAGNOSTIC      Recorded: 08/20/2008;  Comments: With polyp removed, told benign     LAPAROSCOPIC APPENDECTOMY N/A 11/17/2016    Surgeon: Italo Fontaine MD;  Location: [facility] OR;  Service:      MAMMOPLASTY REDUCTION  1982     Family History:    Family History   Problem Relation Age of Onset     Hypertension Mother      Transient ischemic attack Mother      Depression Mother      Polymyalgia rheumatica Mother      Lung Cancer Mother 84.00     Diabetes Type 2  Mother      Cancer Mother      Cancer Father 56.00        Tonsil     Colon Cancer Father  "57.00     Breast Cancer Maternal Grandmother         mastectomy     Social History:  Marital Status:   [4]  Social History     Tobacco Use     Smoking status: Never Smoker     Smokeless tobacco: Never Used   Substance Use Topics     Alcohol use: No     Drug use: No      Medications:    cephALEXin (KEFLEX) 500 MG capsule  acetaminophen (TYLENOL) 500 MG tablet  albuterol (PROAIR HFA;PROVENTIL HFA;VENTOLIN HFA) 90 mcg/actuation inhaler  calcium-vitamin D (CALCIUM-VITAMIN D) 500 mg(1,250mg) -200 unit per tablet  cholecalciferol, vitamin D3, (VITAMIN D3) 2,000 unit cap  cyclobenzaprine (FLEXERIL) 10 MG tablet  famotidine (PEPCID) 20 MG tablet  FLUoxetine (PROZAC) 10 MG capsule  FLUoxetine (PROZAC) 20 MG capsule  fluticasone propionate (FLONASE) 50 mcg/actuation nasal spray  ibuprofen (ADVIL,MOTRIN) 200 MG tablet  magnesium oxide 500 mg Tab  omeprazole (PRILOSEC) 20 MG DR capsule      Review of Systems  CONSTITUTIONAL:NEGATIVE for fever, chills, change in weight  INTEGUMENTARY/SKIN: POSITIVE for tender erythematous lesion   EYES: NEGATIVE for vision changes or irritation  ENT/MOUTH: NEGATIVE for ear, mouth and throat problems  RESP:NEGATIVE for significant cough or SOB  GI: POSITIVE for localized abdominal pain at site of skin changes NEGATIVE for nausea, generalized abdominal pain or change in bowel habits  Physical Exam   BP: (!) 146/85  Pulse: 65  Temp: 97.5  F (36.4  C)  Resp: 18  Height: 157.5 cm (5' 2\")  Weight: 77.1 kg (170 lb)  SpO2: 95 %    Physical Exam  GENERAL APPEARANCE: healthy, alert and no distress  RESP: lungs clear to auscultation - no rales, rhonchi or wheezes  CV: regular rates and rhythm, normal S1 S2, no murmur noted  ABDOMEN:  soft, right is 2 cm erythematous well circumscribed mass in the left upper quadrant of the abdomen, no HSM, bowel sounds normal  SKIN: no suspicious lesions or rashes  ED [facility]    PROCEDURE: " -Incision/Drainage  Performed by: Archana Grimaldo PA-C  Authorized by: Archana Grimaldo PA-C     Risks, benefits and alternatives discussed.      LOCATION:      Type:  Cyst    Size:  2    Location:  Trunk    Trunk location:  Abdomen    PROCEDURE TYPE:     Complexity:  Simple    ANESTHESIA (see MAR for exact dosages):     Anesthesia method:  Local infiltration    Local anesthetic:  Lidocaine 1% w/o epi    PROCEDURE DETAILS:     Incision types:  Single straight    Incision depth:  Subcutaneous    Scalpel blade:  11    Wound management:  Probed and deloculated and irrigated with saline    Drainage:  Purulent    Drainage amount:  Moderate    Wound treatment:  Wound left open    PROCEDURE    Patient Tolerance:  Patient tolerated the procedure well with no immediate complications           Critical Care time:  none        No results found for this or any previous visit (from the past 24 hour(s)).  Medications - No data to display    Assessments & Plan (with Medical Decision Making)     I have reviewed the nursing notes.    I have reviewed the findings, diagnosis, plan and need for follow up with the patient.       New Prescriptions    CEPHALEXIN (KEFLEX) 500 MG CAPSULE    Take 1 capsule (500 mg) by mouth 4 times daily for 7 days       Final diagnoses:   Epidermoid cyst of skin     72-year-old female presents to the urgent care with concern over tender erythematous swollen mass in the left upper abdomen which is been present for the last 10 days.  Physical exam findings most consistent with infected epidermoid cyst.  After discussing versus benefits patient agreed to proceed with incision and drainage of the lesion.  She tolerated procedure with no immediate complications, moderate amount of cottage cheesy and purulent material obtained.  She was discharged home stable with instructions for continued warm compresses to the area 10 to 15 minutes 3-4 times daily.  Follow-up if no resolution of symptoms within the next 3 to 5  days.  Worrisome reasons to return to the ER/UC sooner discussed.    Disclaimer: This note consists of symbols derived from keyboarding, dictation, and/or voice recognition software. As a result, there may be errors in the script that have gone undetected.  Please consider this when interpreting information found in the chart.    5/14/2022   [facility] EMERGENCY DEPT     Archana Grimaldo PA-C  05/16/22 [id number]

## 2022-05-14 NOTE — ED TRIAGE NOTES
Cyst with redness on abdomen since popping a pimple/black head 10 days ago.     Triage Assessment     Row Name 05/14/22 [id number]       Triage Assessment (Adult)    Airway WDL WDL       Respiratory WDL    Respiratory WDL WDL       Skin Circulation/Temperature WDL    Skin Circulation/Temperature WDL WDL       Cardiac WDL    Cardiac WDL WDL       Peripheral/Neurovascular WDL    Peripheral Neurovascular WDL WDL       Cognitive/Neuro/Behavioral WDL    Cognitive/Neuro/Behavioral WDL WDL

## 2022-09-11 ENCOUNTER — HEALTH MAINTENANCE LETTER (OUTPATIENT)
Age: 72
End: 2022-09-11

## 2022-10-20 ASSESSMENT — ENCOUNTER SYMPTOMS
PALPITATIONS: 0
MYALGIAS: 0
JOINT SWELLING: 0
DIZZINESS: 0
NAUSEA: 0
WEAKNESS: 0
FEVER: 0
ARTHRALGIAS: 0
HEMATURIA: 0
CHILLS: 0
SHORTNESS OF BREATH: 0
SORE THROAT: 0
CONSTIPATION: 0
COUGH: 1
FREQUENCY: 0
HEARTBURN: 0
ABDOMINAL PAIN: 0
BREAST MASS: 0
DYSURIA: 0
HEMATOCHEZIA: 0
DIARRHEA: 0
NERVOUS/ANXIOUS: 1
HEADACHES: 0
EYE PAIN: 0
PARESTHESIAS: 0

## 2022-10-20 ASSESSMENT — ACTIVITIES OF DAILY LIVING (ADL): CURRENT_FUNCTION: NO ASSISTANCE NEEDED

## 2022-10-26 ENCOUNTER — OFFICE VISIT (OUTPATIENT)
Dept: FAMILY MEDICINE | Facility: CLINIC | Age: 72
End: 2022-10-26
Payer: COMMERCIAL

## 2022-10-26 VITALS
HEART RATE: 67 BPM | DIASTOLIC BLOOD PRESSURE: 67 MMHG | SYSTOLIC BLOOD PRESSURE: 133 MMHG | TEMPERATURE: 97.7 F | RESPIRATION RATE: 16 BRPM | WEIGHT: 170.38 LBS | BODY MASS INDEX: 31.35 KG/M2 | HEIGHT: 62 IN

## 2022-10-26 DIAGNOSIS — F32.0 CURRENT MILD EPISODE OF MAJOR DEPRESSIVE DISORDER, UNSPECIFIED WHETHER RECURRENT (H): ICD-10-CM

## 2022-10-26 DIAGNOSIS — M89.9 DISORDER OF BONE AND CARTILAGE: ICD-10-CM

## 2022-10-26 DIAGNOSIS — R91.8 PULMONARY NODULES: ICD-10-CM

## 2022-10-26 DIAGNOSIS — E78.5 HYPERLIPIDEMIA LDL GOAL <130: ICD-10-CM

## 2022-10-26 DIAGNOSIS — Z11.59 ENCOUNTER FOR SCREENING FOR OTHER VIRAL DISEASES: ICD-10-CM

## 2022-10-26 DIAGNOSIS — I51.7 LVH (LEFT VENTRICULAR HYPERTROPHY): ICD-10-CM

## 2022-10-26 DIAGNOSIS — R73.09 OTHER ABNORMAL GLUCOSE: ICD-10-CM

## 2022-10-26 DIAGNOSIS — Z78.0 MENOPAUSE: ICD-10-CM

## 2022-10-26 DIAGNOSIS — E55.9 VITAMIN D DEFICIENCY: ICD-10-CM

## 2022-10-26 DIAGNOSIS — I77.810 DILATION OF THORACIC AORTA (H): ICD-10-CM

## 2022-10-26 DIAGNOSIS — Z12.31 VISIT FOR SCREENING MAMMOGRAM: ICD-10-CM

## 2022-10-26 DIAGNOSIS — Z00.00 ENCOUNTER FOR MEDICARE ANNUAL WELLNESS EXAM: Primary | ICD-10-CM

## 2022-10-26 DIAGNOSIS — G47.30 SLEEP APNEA, UNSPECIFIED TYPE: ICD-10-CM

## 2022-10-26 DIAGNOSIS — M94.9 DISORDER OF BONE AND CARTILAGE: ICD-10-CM

## 2022-10-26 DIAGNOSIS — R93.89 ABNORMAL FINDINGS ON DIAGNOSTIC IMAGING OF OTHER SPECIFIED BODY STRUCTURES: ICD-10-CM

## 2022-10-26 LAB
ALBUMIN SERPL BCG-MCNC: 4.3 G/DL (ref 3.5–5.2)
ALP SERPL-CCNC: 79 U/L (ref 35–104)
ALT SERPL W P-5'-P-CCNC: 33 U/L (ref 10–35)
ANION GAP SERPL CALCULATED.3IONS-SCNC: 9 MMOL/L (ref 7–15)
AST SERPL W P-5'-P-CCNC: 27 U/L (ref 10–35)
BILIRUB SERPL-MCNC: 0.5 MG/DL
BUN SERPL-MCNC: 16.9 MG/DL (ref 8–23)
CALCIUM SERPL-MCNC: 10.1 MG/DL (ref 8.8–10.2)
CHLORIDE SERPL-SCNC: 101 MMOL/L (ref 98–107)
CHOLEST SERPL-MCNC: 247 MG/DL
CREAT SERPL-MCNC: 0.86 MG/DL (ref 0.51–0.95)
DEPRECATED CALCIDIOL+CALCIFEROL SERPL-MC: 65 UG/L (ref 20–75)
DEPRECATED HCO3 PLAS-SCNC: 29 MMOL/L (ref 22–29)
ERYTHROCYTE [DISTWIDTH] IN BLOOD BY AUTOMATED COUNT: 12.2 % (ref 10–15)
GFR SERPL CREATININE-BSD FRML MDRD: 71 ML/MIN/1.73M2
GLUCOSE SERPL-MCNC: 99 MG/DL (ref 70–99)
HBA1C MFR BLD: 5.6 % (ref 0–5.6)
HBV SURFACE AB SERPL IA-ACNC: 5.76 M[IU]/ML
HBV SURFACE AB SERPL IA-ACNC: NONREACTIVE M[IU]/ML
HCT VFR BLD AUTO: 45.3 % (ref 35–47)
HDLC SERPL-MCNC: 54 MG/DL
HGB BLD-MCNC: 15.8 G/DL (ref 11.7–15.7)
LDLC SERPL CALC-MCNC: 155 MG/DL
MCH RBC QN AUTO: 33.1 PG (ref 26.5–33)
MCHC RBC AUTO-ENTMCNC: 34.9 G/DL (ref 31.5–36.5)
MCV RBC AUTO: 95 FL (ref 78–100)
NONHDLC SERPL-MCNC: 193 MG/DL
PLATELET # BLD AUTO: 218 10E3/UL (ref 150–450)
POTASSIUM SERPL-SCNC: 4.8 MMOL/L (ref 3.4–5.3)
PROT SERPL-MCNC: 7.1 G/DL (ref 6.4–8.3)
RBC # BLD AUTO: 4.78 10E6/UL (ref 3.8–5.2)
SODIUM SERPL-SCNC: 139 MMOL/L (ref 136–145)
TRIGL SERPL-MCNC: 190 MG/DL
TSH SERPL DL<=0.005 MIU/L-ACNC: 0.93 UIU/ML (ref 0.3–4.2)
WBC # BLD AUTO: 5.4 10E3/UL (ref 4–11)

## 2022-10-26 PROCEDURE — 80061 LIPID PANEL: CPT | Performed by: FAMILY MEDICINE

## 2022-10-26 PROCEDURE — 99214 OFFICE O/P EST MOD 30 MIN: CPT | Mod: 25 | Performed by: FAMILY MEDICINE

## 2022-10-26 PROCEDURE — G0438 PPPS, INITIAL VISIT: HCPCS | Performed by: FAMILY MEDICINE

## 2022-10-26 PROCEDURE — 83036 HEMOGLOBIN GLYCOSYLATED A1C: CPT | Performed by: FAMILY MEDICINE

## 2022-10-26 PROCEDURE — 82306 VITAMIN D 25 HYDROXY: CPT | Performed by: FAMILY MEDICINE

## 2022-10-26 PROCEDURE — 36415 COLL VENOUS BLD VENIPUNCTURE: CPT | Performed by: FAMILY MEDICINE

## 2022-10-26 PROCEDURE — 80053 COMPREHEN METABOLIC PANEL: CPT | Performed by: FAMILY MEDICINE

## 2022-10-26 PROCEDURE — 85027 COMPLETE CBC AUTOMATED: CPT | Performed by: FAMILY MEDICINE

## 2022-10-26 PROCEDURE — 86706 HEP B SURFACE ANTIBODY: CPT | Performed by: FAMILY MEDICINE

## 2022-10-26 PROCEDURE — 84443 ASSAY THYROID STIM HORMONE: CPT | Performed by: FAMILY MEDICINE

## 2022-10-26 RX ORDER — LANOLIN ALCOHOL/MO/W.PET/CERES
1000 CREAM (GRAM) TOPICAL DAILY
COMMUNITY

## 2022-10-26 RX ORDER — MULTIPLE VITAMINS W/ MINERALS TAB 9MG-400MCG
1 TAB ORAL DAILY
COMMUNITY

## 2022-10-26 ASSESSMENT — ENCOUNTER SYMPTOMS
ABDOMINAL PAIN: 0
DIZZINESS: 0
SHORTNESS OF BREATH: 0
COUGH: 1
FEVER: 0
MYALGIAS: 0
CHILLS: 0
PALPITATIONS: 0
HEMATOCHEZIA: 0
HEADACHES: 0
HEARTBURN: 0
EYE PAIN: 0
BREAST MASS: 0
JOINT SWELLING: 0
ARTHRALGIAS: 0
PARESTHESIAS: 0
HEMATURIA: 0
DIARRHEA: 0
FREQUENCY: 0
WEAKNESS: 0
DYSURIA: 0
CONSTIPATION: 0
NAUSEA: 0
SORE THROAT: 0
NERVOUS/ANXIOUS: 1

## 2022-10-26 ASSESSMENT — ANXIETY QUESTIONNAIRES
3. WORRYING TOO MUCH ABOUT DIFFERENT THINGS: SEVERAL DAYS
7. FEELING AFRAID AS IF SOMETHING AWFUL MIGHT HAPPEN: NOT AT ALL
8. IF YOU CHECKED OFF ANY PROBLEMS, HOW DIFFICULT HAVE THESE MADE IT FOR YOU TO DO YOUR WORK, TAKE CARE OF THINGS AT HOME, OR GET ALONG WITH OTHER PEOPLE?: SOMEWHAT DIFFICULT
IF YOU CHECKED OFF ANY PROBLEMS ON THIS QUESTIONNAIRE, HOW DIFFICULT HAVE THESE PROBLEMS MADE IT FOR YOU TO DO YOUR WORK, TAKE CARE OF THINGS AT HOME, OR GET ALONG WITH OTHER PEOPLE: SOMEWHAT DIFFICULT
4. TROUBLE RELAXING: SEVERAL DAYS
7. FEELING AFRAID AS IF SOMETHING AWFUL MIGHT HAPPEN: NOT AT ALL
GAD7 TOTAL SCORE: 4
GAD7 TOTAL SCORE: 4
2. NOT BEING ABLE TO STOP OR CONTROL WORRYING: SEVERAL DAYS
5. BEING SO RESTLESS THAT IT IS HARD TO SIT STILL: NOT AT ALL
GAD7 TOTAL SCORE: 4
6. BECOMING EASILY ANNOYED OR IRRITABLE: NOT AT ALL
1. FEELING NERVOUS, ANXIOUS, OR ON EDGE: SEVERAL DAYS

## 2022-10-26 ASSESSMENT — PATIENT HEALTH QUESTIONNAIRE - PHQ9
SUM OF ALL RESPONSES TO PHQ QUESTIONS 1-9: 7
10. IF YOU CHECKED OFF ANY PROBLEMS, HOW DIFFICULT HAVE THESE PROBLEMS MADE IT FOR YOU TO DO YOUR WORK, TAKE CARE OF THINGS AT HOME, OR GET ALONG WITH OTHER PEOPLE: SOMEWHAT DIFFICULT
SUM OF ALL RESPONSES TO PHQ QUESTIONS 1-9: 7

## 2022-10-26 ASSESSMENT — ACTIVITIES OF DAILY LIVING (ADL): CURRENT_FUNCTION: NO ASSISTANCE NEEDED

## 2022-10-26 NOTE — PROGRESS NOTES
"SUBJECTIVE:   Tayler is a 72 year old who presents for Preventive Visit.      Patient has been advised of split billing requirements and indicates understanding: Yes     Are you in the first 12 months of your Medicare coverage?  No    Healthy Habits:     In general, how would you rate your overall health?  Good    Frequency of exercise:  4-5 days/week    Duration of exercise:  45-60 minutes    Do you usually eat at least 4 servings of fruit and vegetables a day, include whole grains    & fiber and avoid regularly eating high fat or \"junk\" foods?  No    Taking medications regularly:  Yes    Medication side effects:  None    Ability to successfully perform activities of daily living:  No assistance needed    Home Safety:  Lack of grab bars in the bathroom    Hearing Impairment:  Difficulty following a conversation in a noisy restaurant or crowded room and difficulty understanding soft or whispered speech    In the past 6 months, have you been bothered by leaking of urine? Yes    In general, how would you rate your overall mental or emotional health?  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    Left ventricular hypertrophy: This was mild on echo in 2020.  Patient has been monitoring her blood pressure and she said it is always under 140 on top and under 90 on the bottom but she will continue to monitor.    Hyperlipidemia: Diet controlled at this point, following with blood work    Pulmonary nodules: Stable on chest CT from 5819-5398    Depression:  On Prozac 30 mg.  She may consider increasing this medication down the road if needed.  Not necessarily interested in counseling but open to the information.    Social: Stress with daughter in law.  She has not been able to see her grandchildren recently.  Her daughter is getting a divorce and that has been stressful for her as well.         Do you feel safe in your environment? Yes    Have you " ever done Advance Care Planning? (For example, a Health Directive, POLST, or a discussion with a medical provider or your loved ones about your wishes): Yes, patient states has an Advance Care Planning document and will bring a copy to the clinic.       Fall risk  Fallen 2 or more times in the past year?: No  Any fall with injury in the past year?: No    Cognitive Screening   1) Repeat 3 items (Leader, Season, Table)    2) Clock draw: NORMAL  3) 3 item recall: Recalls 3 objects  Results: 3 items recalled: COGNITIVE IMPAIRMENT LESS LIKELY    Mini-CogTM Copyright S Garrett. Licensed by the author for use in [facility]; reprinted with permission (sochris@[facility]). All rights reserved.      Do you have sleep apnea, excessive snoring or daytime drowsiness?: yes    Reviewed and updated as needed this visit by clinical staff   Tobacco  Allergies  Meds              Reviewed and updated as needed this visit by Provider                 Social History     Tobacco Use     Smoking status: Never     Smokeless tobacco: Never   Substance Use Topics     Alcohol use: No         Alcohol Use 10/20/2022   Prescreen: >3 drinks/day or >7 drinks/week? No   Prescreen: >3 drinks/day or >7 drinks/week? -               Current providers sharing in care for this patient include:   Patient Care Team:  Rissa Corral MD as PCP - General (Family Medicine)  Rissa Corral MD as Assigned PCP    The following health maintenance items are reviewed in Epic and correct as of today:    Health Maintenance   Topic Date Due     HEPATITIS B IMMUNIZATION (2 of 3 - 3-dose series) Checking antibodies     ANNUAL REVIEW OF HM ORDERS  Today     MEDICARE ANNUAL WELLNESS VISIT  Today     MAMMO SCREENING  Ordered     PHQ-9  Today     FALL RISK ASSESSMENT  Today     LIPID  Today     COLORECTAL CANCER SCREENING  04/26/2027     ADVANCE CARE PLANNING  We would like a copy please     DTAP/TDAP/TD IMMUNIZATION (3 - Td or Tdap) 05/08/2028     DIANELYS   "Ordered     HEPATITIS C SCREENING  Completed     INFLUENZA VACCINE  Completed     Pneumococcal Vaccine: 65+ Years  Completed     ZOSTER IMMUNIZATION  Completed     COVID-19 Vaccine  Completed     IPV IMMUNIZATION  Aged Out     MENINGITIS IMMUNIZATION  Aged Out     Lab work is in process  See orders          Review of Systems   Constitutional: Negative for chills and fever.   HENT: Positive for hearing loss. Negative for congestion, ear pain and sore throat.    Eyes: Negative for pain and visual disturbance.   Respiratory: Positive for cough. Negative for shortness of breath.    Cardiovascular: Negative for chest pain, palpitations and peripheral edema.   Gastrointestinal: Negative for abdominal pain, constipation, diarrhea, heartburn, hematochezia and nausea.   Breasts:  Negative for tenderness and breast mass.   Genitourinary: Negative for dysuria, frequency, genital sores, hematuria, pelvic pain, urgency, vaginal bleeding and vaginal discharge.   Musculoskeletal: Negative for arthralgias, joint swelling and myalgias.   Skin: Negative for rash.   Neurological: Negative for dizziness, weakness, headaches and paresthesias.   Psychiatric/Behavioral: The patient is nervous/anxious.          OBJECTIVE:   /67 (BP Location: Left arm, Patient Position: Sitting, Cuff Size: Adult Regular)   Pulse 67   Temp 97.7  F (36.5  C) (Oral)   Resp 16   Ht 1.575 m (5' 2\")   Wt 77.3 kg (170 lb 6 oz)   LMP  (LMP Unknown)   BMI 31.16 kg/m   Estimated body mass index is 31.16 kg/m  as calculated from the following:    Height as of this encounter: 1.575 m (5' 2\").    Weight as of this encounter: 77.3 kg (170 lb 6 oz).  Physical Exam  GENERAL: healthy, alert and no distress  EYES: Eyes grossly normal to inspection, PERRL and conjunctivae and sclerae normal  HENT: ear canals and TM's normal, nose and mouth without ulcers or lesions  NECK: no adenopathy, no asymmetry, masses, or scars and thyroid normal to palpation  RESP: lungs " clear to auscultation - no rales, rhonchi or wheezes  BREAST: normal without masses, tenderness or nipple discharge and no palpable axillary masses or adenopathy  CV: regular rate and rhythm, normal S1 S2, no S3 or S4, no murmur, click or rub, no peripheral edema and peripheral pulses strong  ABDOMEN: soft, nontender, no hepatosplenomegaly, no masses and bowel sounds normal  MS: no gross musculoskeletal defects noted, no edema  SKIN: no suspicious lesions or rashes  NEURO: Normal strength and tone, mentation intact and speech normal  PSYCH: mentation appears normal, affect normal/bright    Diagnostic Test Results:  Labs reviewed in Epic    ASSESSMENT / PLAN:       ICD-10-CM    1. Encounter for Medicare annual wellness exam  Z00.00       2. Dilation of thoracic aorta (H)  I77.810 Comprehensive metabolic panel     CBC with platelets     TSH with free T4 reflex     Hepatitis B Surface Antibody     Comprehensive metabolic panel     CBC with platelets     TSH with free T4 reflex     Hepatitis B Surface Antibody      3. Pulmonary nodules  R91.8       4. Sleep apnea, unspecified type  G47.30       5. Prediabetes  R73.09 Hemoglobin A1c     Hemoglobin A1c      6. Current mild episode of major depressive disorder, unspecified whether recurrent (H)  F32.0       7. Osteopenia  M89.9     M94.9       8. LVH (left ventricular hypertrophy)  I51.7       9. Hyperlipidemia LDL goal <130  E78.5 Lipid Profile     Lipid Profile      10. Vitamin D deficiency  E55.9 Vitamin D Deficiency     Vitamin D Deficiency      11. Menopause  Z78.0 DX Hip/Pelvis/Spine      12. Visit for screening mammogram  Z12.31 MA Screen Bilateral w/Iglesia      13. Abnormal findings on diagnostic imaging of other specified body structures  R93.89 TSH with free T4 reflex     TSH with free T4 reflex      14. Encounter for screening for other viral diseases  Z11.59 Hepatitis B Surface Antibody     Hepatitis B Surface Antibody        Counseling if needed or  "wishes.    Continue fluoxetine 30 mg.  If you think you would like to increase to 40 mg please send me a Cellad[employer] message.    Please follow-up with your pulmonary doctor regarding the pulmonary nodule and sleep apnea and possible pulmonary hypertension on CT.  That doctor can order your chest CT if needed.    If your pulmonary doctor is ordering a chest CT please asked them to do that with contrast so that we can again image the ascending aorta.    On your heart ultrasound from 2020 it showed mild left ventricular hypertrophy or mild enlargement of the base of the heart.  There was no change from an echo in 2014.  It showed good heart pump function.  With this we do want you to monitor your blood pressure periodically.  If it is ever over 140 on top or ever over 90 on the bottom I want you to let me know.    I am glad you see Dermatology for full body skin checks.    We will plan an echo next year.  That we will look at the heart and we can reimage the ascending aorta.  In 2020 that was 3.7 cm on echo and 3.8 cm on CT in 2021.    Patient has been advised of split billing requirements and indicates understanding: Yes      COUNSELING:  Reviewed preventive health counseling, as reflected in patient instructions       Regular exercise       Healthy diet/nutrition    Estimated body mass index is 31.16 kg/m  as calculated from the following:    Height as of this encounter: 1.575 m (5' 2\").    Weight as of this encounter: 77.3 kg (170 lb 6 oz).    Weight management plan: Discussed healthy diet and exercise guidelines    She reports that she has never smoked. She has never used smokeless tobacco.      Appropriate preventive services were discussed with this patient, including applicable screening as appropriate for cardiovascular disease, diabetes, osteopenia/osteoporosis, and glaucoma.  As appropriate for age/gender, discussed screening for colorectal cancer, prostate cancer, breast cancer, and cervical cancer. Checklist " reviewing preventive services available has been given to the patient.    Reviewed patients plan of care and provided an AVS. The Basic Care Plan (routine screening as documented in Health Maintenance) for Tayler meets the Care Plan requirement. This Care Plan has been established and reviewed with the Patient.    Counseling Resources:  ATP IV Guidelines  Pooled Cohorts Equation Calculator  Breast Cancer Risk Calculator  Breast Cancer: Medication to Reduce Risk  FRAX Risk Assessment  ICSI Preventive Guidelines  Dietary Guidelines for Americans, 2010  [employer]'s MyPlate  ASA Prophylaxis  Lung CA Screening    Rissa Corral MD  [facility]    Identified Health Risks:  Answers for HPI/ROS submitted by the patient on 10/26/2022  If you checked off any problems, how difficult have these problems made it for you to do your work, take care of things at home, or get along with other people?: Somewhat difficult  PHQ9 TOTAL SCORE: 7  DC 7 TOTAL SCORE: 4

## 2022-10-26 NOTE — PROGRESS NOTES
"    The patient was counseled and encouraged to consider modifying their diet and eating habits. She was provided with information on recommended healthy diet options.  The patient was provided with written information regarding signs of hearing loss.  Information on urinary incontinence and treatment options given to patient.  The patient was provided with suggestions to help her develop a healthy emotional lifestyle.  The patient's PHQ-9 score is consistent with mild depression. She was provided with information regarding depression and was advised to schedule a follow up appointment in  weeks to further address this issue.  Answers for HPI/ROS submitted by the patient on 10/26/2022  If you checked off any problems, how difficult have these problems made it for you to do your work, take care of things at home, or get along with other people?: Somewhat difficult  PHQ9 TOTAL SCORE: 7  DC 7 TOTAL SCORE: 4  In general, how would you rate your overall physical health?: good  Frequency of exercise:: 4-5 days/week  Do you usually eat at least 4 servings of fruit and vegetables a day, include whole grains & fiber, and avoid regularly eating high fat or \"junk\" foods? : No  Taking medications regularly:: Yes  Medication side effects:: None  Activities of Daily Living: no assistance needed  Home safety: lack of grab bars in the bathroom  Hearing Impairment:: difficulty following a conversation in a noisy restaurant or crowded room, difficulty understanding soft or whispered speech  In the past 6 months, have you been bothered by leaking of urine?: Yes  abdominal pain: No  Blood in stool: No  Blood in urine: No  chest pain: No  chills: No  congestion: No  constipation: No  cough: Yes  diarrhea: No  dizziness: No  ear pain: No  eye pain: No  nervous/anxious: Yes  fever: No  frequency: No  genital sores: No  headaches: No  hearing loss: Yes  heartburn: No  arthralgias: No  joint swelling: No  peripheral edema: No  myalgias: " No  nausea: No  dysuria: No  palpitations: No  Skin sensation changes: No  sore throat: No  urgency: No  rash: No  shortness of breath: No  visual disturbance: No  weakness: No  pelvic pain: No  vaginal bleeding: No  vaginal discharge: No  tenderness: No  breast mass: No  In general, how would you rate your overall mental or emotional health?: fair  Additional concerns today:: No  Duration of exercise:: 45-60 minutes

## 2022-10-26 NOTE — PATIENT INSTRUCTIONS
Counseling if needed or wishes.    Continue fluoxetine 30 mg.  If you think you would like to increase to 40 mg please send me a Nualight message.    Please follow-up with your pulmonary doctor regarding the pulmonary nodule and sleep apnea and possible pulmonary hypertension on CT.  That doctor can order your chest CT if needed.    If your pulmonary doctor is ordering a chest CT please asked them to do that with contrast so that we can again image the ascending aorta.    On your heart ultrasound from 2020 it showed mild left ventricular hypertrophy or mild enlargement of the base of the heart.  There was no change from an echo in 2014.  It showed good heart pump function.  With this we do want you to monitor your blood pressure periodically.  If it is ever over 140 on top or ever over 90 on the bottom I want you to let me know.    I am glad you see Dermatology for full body skin checks.    We will plan an echo next year.  That we will look at the heart and we can reimage the ascending aorta.  In 2020 that was 3.7 cm on echo and 3.8 cm on CT in 2021.        Understanding USDA MyPlate  The USDA has guidelines to help you make healthy food choices. These are called MyPlate. MyPlate shows the food groups that make up healthy meals using the image of a place setting. Before you eat, think about the healthiest choices for what to put on your plate or in your cup or bowl. To learn more about building a healthy plate, visit www.choosemyplate.gov.    The food groups  Fruits. Any fruit or 100% fruit juice counts as part of the Fruit Group. Fruits may be fresh, canned, frozen, or dried, and may be whole, cut-up, or pureed. Make 1/2 of your plate fruits and vegetables.  Vegetables. Any vegetable or 100% vegetable juice counts as a member of the Vegetable Group. Vegetables may be fresh, frozen, canned, or dried. They can be served raw or cooked and may be whole, cut-up, or mashed. Make 1/2 of your plate fruits and  vegetables.  Grains. All foods made from grains are part of the Grains Group. These include wheat, rice, oats, cornmeal, and barley. Grains are often used to make foods such as bread, pasta, oatmeal, cereal, tortillas, and grits. Grains should be no more than 1/4 of your plate. At least half of your grains should be whole grains.  Protein. This group includes meat, poultry, seafood, beans and peas, eggs, processed soy products (such as tofu), nuts (including nut butters), and seeds. Make protein choices no more than 1/4 of your plate. Meat and poultry choices should be lean or low fat.  Dairy. The Dairy Group includes all fluid milk products and foods made from milk that contain calcium, such as yogurt and cheese. (Foods that have little calcium, such as cream, butter, and cream cheese, are not part of this group.) Most dairy choices should be low-fat or fat-free.  Oils. Oils aren't a food group, but they do contain essential nutrients. However it's important to watch your intake of oils. These are fats that are liquid at room temperature. They include canola, corn, olive, soybean, vegetable, and sunflower oil. Foods that are mainly oil include mayonnaise, certain salad dressings, and soft margarines. You likely already get your daily oil allowance from the foods you eat.  Things to limit  Eating healthy also means limiting these things in your diet:     Salt (sodium). Many processed foods have a lot of sodium. To keep sodium intake down, eat fresh vegetables, meats, poultry, and seafood when possible. Purchase low-sodium, reduced-sodium, or no-salt-added food products at the store. And don't add salt to your meals at home. Instead, season them with herbs and spices such as dill, oregano, cumin, and paprika. Or try adding flavor with lemon or lime zest and juice.  Saturated fat. Saturated fats are most often found in animal products such as beef, pork, and chicken. They are often solid at room temperature, such as  butter. To reduce your saturated fat intake, choose leaner cuts of meat and poultry. And try healthier cooking methods such as grilling, broiling, roasting, or baking. For a simple lower-fat swap, use plain nonfat yogurt instead of mayonnaise when making potato salad or macaroni salad.  Added sugars. These are sugars added to foods. They are in foods such as ice cream, candy, soda, fruit drinks, sports drinks, energy drinks, cookies, pastries, jams, and syrups. Cut down on added sugars by sharing sweet treats with a family member or friend. You can also choose fruit for dessert, and drink water or other unsweetened beverages.     StayWell last reviewed this educational content on 6/1/2020 2000-2021 [employer]. All rights reserved. This information is not intended as a substitute for professional medical care. Always follow your healthcare professional's instructions.          Signs of Hearing Loss      Hearing much better with one ear can be a sign of hearing loss.   Hearing loss is a problem shared by many people. In fact, it is one of the most common health problems, particularly as people age. Most people age 65 and older have some hearing loss. By age 80, almost everyone does. Hearing loss often occurs slowly over the years. So you may not realize your hearing has gotten worse.  Have your hearing checked  Call your healthcare provider if you:  Have to strain to hear normal conversation  Have to watch other people s faces very carefully to follow what they re saying  Need to ask people to repeat what they ve said  Often misunderstand what people are saying  Turn the volume of the television or radio up so high that others complain  Feel that people are mumbling when they re talking to you  Find that the effort to hear leaves you feeling tired and irritated  Notice, when using the phone, that you hear better with one ear than the other  [employer] last reviewed this educational content on  1/1/2020 2000-2021 [employer]. All rights reserved. This information is not intended as a substitute for professional medical care. Always follow your healthcare professional's instructions.          Urinary Incontinence, Female (Adult)   Urinary incontinence means loss of bladder control. This problem affects many women, especially as they get older. If you have incontinence, you may be embarrassed to ask for help. But know that this problem can be treated.   Types of Incontinence  There are different types of incontinence. Two of the main types are described here. You can have more than one type.   Stress incontinence. With this type, urine leaks when pressure (stress) is put on the bladder. This may happen when you cough, sneeze, or laugh. Stress incontinence most often occurs because the pelvic floor muscles that support the bladder and urethra are weak. This can happen after pregnancy and vaginal childbirth or a hysterectomy. It can also be due to excess body weight or hormone changes.  Urge incontinence (also called overactive bladder). With this type, a sudden urge to urinate is felt often. This may happen even though there may not be much urine in the bladder. The need to urinate often during the night is common. Urge incontinence most often occurs because of bladder spasms. This may be due to bladder irritation or infection. Damage to bladder nerves or pelvic muscles, constipation, and certain medicines can also lead to urge incontinence.  Treatment depends on the cause. Further evaluation is needed to find the type you have. This will likely include an exam and certain tests. Based on the results, you and your healthcare provider can then plan treatment. Until a diagnosis is made, the home care tips below can help ease symptoms.   Home care  Do pelvic floor muscle exercises, if they are prescribed. The pelvic floor muscles help support the bladder and urethra. Many women find that their  symptoms improve when doing special exercises that strengthen these muscles. To do the exercises, contract the muscles you would use to stop your stream of urine. But do this when you re not urinating. Hold for 10 seconds, then relax. Repeat 10 to 20 times in a row, at least 3 times a day. Your healthcare provider may give you other instructions for how to do the exercises and how often.  Keep a bladder diary. This helps track how often and how much you urinate over a set period of time. Bring this diary with you to your next visit with the provider. The information can help your provider learn more about your bladder problem.  Lose weight, if advised to by your provider. Extra weight puts pressure on the bladder. Your provider can help you create a weight-loss plan that s right for you. This may include exercising more and making certain diet changes.  Don't have foods and drinks that may irritate the bladder. These can include alcohol and caffeinated drinks.  Quit smoking. Smoking and other tobacco use can lead to a long-term (chronic) cough that strains the pelvic floor muscles. Smoking may also damage the bladder and urethra. Talk with your provider about treatments or methods you can use to quit smoking.  If drinking large amounts of fluid makes you have symptoms, you may be advised to limit your fluid intake. You may also be advised to drink most of your fluids during the day and to limit fluids at night.  If you re worried about urine leakage or accidents, you may wear absorbent pads to catch urine. Change the pads often. This helps reduce discomfort. It may also reduce the risk of skin or bladder infections.    Follow-up care  Follow up with your healthcare provider, or as directed. It may take some to find the right treatment for your problem. But healthy lifestyle changes can be made right away. These include such things as exercising on a regular basis, eating a healthy diet, losing weight (if needed),  and quitting smoking. Your treatment plan may include special therapies or medicines. Certain procedures or surgery may also be options. Talk about any questions you have with your provider.   When to seek medical advice  Call the healthcare provider right away if any of these occur:  Fever of 100.4 F (38 C) or higher, or as directed by your provider  Bladder pain or fullness  Belly swelling  Nausea or vomiting  Back pain  Weakness, dizziness, or fainting  [employer] last reviewed this educational content on 1/1/2020 2000-2021 [employer]. All rights reserved. This information is not intended as a substitute for professional medical care. Always follow your healthcare professional's instructions.        Your Health Risk Assessment indicates you feel you are not in good emotional health.    Recreation   Recreation is not limited to sports and team events. It includes any activity that provides relaxation, interest, enjoyment, and exercise. Recreation provides an outlet for physical, mental, and social energy. It can give a sense of worth and achievement. It can help you stay healthy.    Mental Exercise and Social Involvement  Mental and emotional health is as important as physical health. Keep in touch with friends and family. Stay as active as possible. Continue to learn and challenge yourself.   Things you can do to stay mentally active are:  Learn something new, like a foreign language or musical instrument.   Play SCRABBLE or do crossword puzzles. If you cannot find people to play these games with you at home, you can play them with others on your computer through the Internet.   Join a games club--anything from card games to chess or checkers or lawn bowling.   Start a new hobby.   Go back to school.   Volunteer.   Read.   Keep up with world events.    Depression and Suicide in Older Adults    Nearly 2 million older Americans have some type of depression. Some of them even take their own lives.  "Yet depression among older adults is often ignored. Learn the warning signs. You may help spare a loved one needless pain. You may also save a life.   What is depression?  Depression is a common and serious illness that affects the way you think and feel. It is not a normal part of aging, nor is it a sign of weakness, a character flaw, or something you can snap out of. Most people with depression need treatment to get better. The most common symptom is a feeling of deep sadness. People who are depressed also may seem tired and listless. And nothing seems to give them pleasure. It s normal to grieve or be sad sometimes. But sadness lessens or passes with time. Depression rarely goes away or improves on its own. A person with clinical depression can't \"snap out of it.\" Other symptoms of depression are:   Sleeping more or less than normal  Eating more or less than normal  Having headaches, stomachaches, or other pains that don t go away  Feeling nervous,  empty,  or worthless  Crying a great deal  Thinking or talking about suicide or death  Loss of interest in activities previously enjoyed  Social isolation  Feeling confused or forgetful  What causes it?  The causes of depression aren t fully known. But it is thought to result from a complex blend of these factors:   Biochemistry. Certain chemicals in the brain play a role.  Genes. Depression does run in families.  Life stress. Life stresses can also trigger depression in some people. Older adults often face many stressors, such as death of friends or a spouse, health problems, and financial concerns.  Chronic conditions. This includes conditions such as diabetes, heart disease, or cancer. These can cause symptoms of depression. Medicine side effects can cause changes in thoughts and behaviors.  How you can help  Often, depressed people may not want to ask for help. When they do, they may be ignored. Or, they may receive the wrong treatment. You can help by showing " parents and older friends love and support. If they seem depressed, don t lecture the person, ignore the symptoms, or discount the symptoms as a  normal  part of aging -which they are not. Get involved, listen, and show interest and support.   Help them understand that depression is a treatable illness. Tell them you can help them find the right treatment. Offer to go to their healthcare provider's appointment with them for support when the symptoms are discussed. With their approval, contact a local mental health center, social service agency, or hospital about services.   You can be an advocate for him or her at healthcare appointments. Many older adults have chronic illnesses that can cause symptoms of depression. Medicine side effects can change thoughts and behaviors. You can help make sure that the healthcare provider looks at all of these factors. He or she should refer your family member or friend to a mental healthcare provider when needed. in some cases, untreated depression can lead to a misdiagnosis. A person may be diagnosed with a brain disorder such as dementia. If the healthcare provider does not take the issue of depression seriously, help your family member or friend to find another provider.   Don't be afraid to ask  If you think an older person you care about could be suicidal, ask,  Have you thought about suicide?  Most people will tell you the truth. If they say  yes,  they may already have a plan for how and when they will attempt it. Find out as much as you can. The more detailed the plan, and the easier it is to carry out, the more danger the person is in right now. Tell the person you are there for them and do not want them to harm him or herself. Don't wait to get help for the person. Call the person's healthcare provider, local hospital, or emergency services.   To learn more  National Suicide Prevention Lifeline (crisis hotline) 957-516-XGBE (837-199-3148)  National [place] of Mental  Meewtt106-371-1731cjp.[facility].nih.gov  National [place] on Mental Voufnpd559-993-1633ovi.jadiel.org  Mental Health Rpraysz409-303-8975kcu.[facility].org  National Suicide Joceced012-PMDWYPY (147-775-4543)    Call 911  Never leave the person alone. A person who is actively suicidal needs psychiatric care right away. They will need constant supervision. Never leave the person out of sight. Call 911 or the [facility] 24-hour suicide crisis hotline at 636-937-RYQQ ([phone]). You can also take the person to the closest emergency room.   [employer] last reviewed this educational content on 5/1/2020 2000-2021 [employer]Well [employer]. All rights reserved. This information is not intended as a substitute for professional medical care. Always follow your healthcare professional's instructions.      [facility]:   [facility] [phone]  [address], [place] [address]   This group also does pediatric ADHD evaluation    [place]:  [place] [facility]: [phone]  [address], [facility] [address]    [facility]: [phone]  [address], [place] [address]    [facility]:  [phone]   [address] [place] [address]  This group also does pediatric ADHD evaluation    [facility]:  [facility] [phone]   [address] , [address]  This group also does pediatric ADHD evaluation    [place]:  [facility]: [phone]  [facility]. SO.     [place]:  [facility]  [phone]  [address] [place] [address]    [facility] [phone]    [address], [facility]  [id number]    [facility] /[phone]   [address], [address]    [facility] [phone] [address], [address]    [facility] [phone]   [facility]    [facility]:  [facility]  Psychiatrists and  Psychologists  [phone]    [facility]:  [place]  Adult Sexual Health Counselors:  Milton Hardy and Harjeet Larry  [phone]    [facility]:  [facility]  Counselor that specializes in youth sexual health including transgender  Patricia Barneyfestus  [phone]    Health Maintenance   Topic Date Due    HEPATITIS B IMMUNIZATION (2 of 3 - 3-dose series) Checking antibodies    ANNUAL REVIEW OF HM ORDERS  Today    MEDICARE ANNUAL WELLNESS VISIT  Today    MAMMO SCREENING  Ordered    PHQ-9  Today    FALL RISK ASSESSMENT  Today    LIPID  Today    COLORECTAL CANCER SCREENING  04/26/2027    ADVANCE CARE PLANNING  We would like a copy please    DTAP/TDAP/TD IMMUNIZATION (3 - Td or Tdap) 05/08/2028    DEXA  Ordered    HEPATITIS C SCREENING  Completed    INFLUENZA VACCINE  Completed    Pneumococcal Vaccine: 65+ Years  Completed    ZOSTER IMMUNIZATION  Completed    COVID-19 Vaccine  Completed    IPV IMMUNIZATION  Aged Out    MENINGITIS IMMUNIZATION  Aged Out       If you choose to use [employer] to send a provider a message please keep this very brief.  They should only be used for a follow-up questions to a previous appointment.  New concerns should addressed by a phone call to triage, E -visit or an office visit.  Certainly if it is an emergency call 911.  If there are labor related questions please call [facility] or [facility].  Thank-you.    Your lab results will be communicated to you via letter, Social[employer] message or phone call when they are all back.  Please refrain from sending messages on one specific lab until they are all back.  Thank you.

## 2022-11-17 ENCOUNTER — HOSPITAL ENCOUNTER (OUTPATIENT)
Dept: BONE DENSITY | Facility: HOSPITAL | Age: 72
Discharge: HOME | End: 2022-11-17
Attending: FAMILY MEDICINE
Payer: COMMERCIAL

## 2022-11-17 ENCOUNTER — ANCILLARY PROCEDURE (OUTPATIENT)
Dept: MAMMOGRAPHY | Facility: HOSPITAL | Age: 72
End: 2022-11-17
Attending: FAMILY MEDICINE
Payer: COMMERCIAL

## 2022-11-17 DIAGNOSIS — Z12.31 VISIT FOR SCREENING MAMMOGRAM: ICD-10-CM

## 2022-11-17 DIAGNOSIS — Z78.0 MENOPAUSE: ICD-10-CM

## 2022-11-17 PROCEDURE — 77080 DXA BONE DENSITY AXIAL: CPT

## 2022-11-17 PROCEDURE — 77067 SCR MAMMO BI INCL CAD: CPT

## 2022-11-27 DIAGNOSIS — M85.80 OSTEOPENIA WITH HIGH RISK OF FRACTURE: Primary | ICD-10-CM

## 2023-02-20 ENCOUNTER — E-VISIT (OUTPATIENT)
Dept: FAMILY MEDICINE | Facility: CLINIC | Age: 73
End: 2023-02-20
Payer: COMMERCIAL

## 2023-02-20 DIAGNOSIS — G47.00 INSOMNIA, UNSPECIFIED TYPE: Primary | ICD-10-CM

## 2023-02-20 PROCEDURE — 99421 OL DIG E/M SVC 5-10 MIN: CPT | Performed by: FAMILY MEDICINE

## 2023-02-20 RX ORDER — TRAZODONE HYDROCHLORIDE 50 MG/1
TABLET, FILM COATED ORAL
Qty: 180 TABLET | Refills: 1 | Status: SHIPPED | OUTPATIENT
Start: 2023-02-20 | End: 2024-04-02

## 2023-02-21 NOTE — PATIENT INSTRUCTIONS
Thank you for choosing us for your care. I have placed an order for a prescription so that you can start treatment. View your full visit summary for details by clicking on the link below. Your pharmacist will able to address any questions you may have about the medication.     If you're not feeling better within 5-7 days, please schedule an appointment.  You can schedule an appointment right here in [facility], or call [phone]  If the visit is for the same symptoms as your eVisit, we'll refund the cost of your eVisit if seen within seven days.

## 2023-03-16 DIAGNOSIS — R45.86 MOOD CHANGES: ICD-10-CM

## 2023-03-16 DIAGNOSIS — F32.A DEPRESSION, UNSPECIFIED DEPRESSION TYPE: ICD-10-CM

## 2023-03-17 RX ORDER — FLUOXETINE 10 MG/1
CAPSULE ORAL
Qty: 90 CAPSULE | Refills: 3 | Status: SHIPPED | OUTPATIENT
Start: 2023-03-17 | End: 2023-11-08

## 2023-03-17 NOTE — TELEPHONE ENCOUNTER
"Routing refill request to provider for review/approval because:  PHQ-9 not <5 in past 6 months    Fluoxetine 10 mg  Last Written Prescription Date:  2/18/22  Last Fill Quantity: 90,  # refills: 3     Fluoxetine 20 mg  Last Written Prescription Date:  4/16/22  Last Fill Quantity: 90,  # refills: 3     Last office visit provider:  10/26/22     Requested Prescriptions   Pending Prescriptions Disp Refills     FLUoxetine (PROZAC) 10 MG capsule [Pharmacy Med Name: FLUoxetine HCl 10 MG Oral Capsule] 90 capsule 3     Sig: TAKE 1 CAPSULE BY MOUTH  DAILY TO TAKE WITH 20 MG TO EQUAL 30 MG DAILY       SSRIs Protocol Failed - 3/16/2023  9:25 PM        Failed - PHQ-9 score less than 5 in past 6 months     Please review last PHQ-9 score.           Passed - Medication is active on med list        Passed - Patient is age 18 or older        Passed - No active pregnancy on record        Passed - No positive pregnancy test in last 12 months        Passed - Recent (6 mo) or future (30 days) visit within the authorizing provider's specialty     Patient had office visit in the last 6 months or has a visit in the next 30 days with authorizing provider or within the authorizing provider's specialty.  See \"Patient Info\" tab in inbasket, or \"Choose Columns\" in Meds & Orders section of the refill encounter.               FLUoxetine (PROZAC) 20 MG capsule [Pharmacy Med Name: FLUoxetine HCl 20 MG Oral Capsule] 90 capsule 3     Sig: TAKE 1 CAPSULE BY MOUTH  DAILY       SSRIs Protocol Failed - 3/16/2023  9:25 PM        Failed - PHQ-9 score less than 5 in past 6 months     Please review last PHQ-9 score.           Passed - Medication is active on med list        Passed - Patient is age 18 or older        Passed - No active pregnancy on record        Passed - No positive pregnancy test in last 12 months        Passed - Recent (6 mo) or future (30 days) visit within the authorizing provider's specialty     Patient had office visit in the last 6 " "months or has a visit in the next 30 days with authorizing provider or within the authorizing provider's specialty.  See \"Patient Info\" tab in inbasket, or \"Choose Columns\" in Meds & Orders section of the refill encounter.                 YANDY SILVA RN 03/17/23 12:36 PM  "

## 2023-05-11 DIAGNOSIS — K21.00 GASTROESOPHAGEAL REFLUX DISEASE WITH ESOPHAGITIS WITHOUT HEMORRHAGE: ICD-10-CM

## 2023-05-11 RX ORDER — FAMOTIDINE 20 MG/1
TABLET, FILM COATED ORAL
Qty: 200 TABLET | Refills: 2 | Status: SHIPPED | OUTPATIENT
Start: 2023-05-11 | End: 2023-11-08

## 2023-05-11 NOTE — TELEPHONE ENCOUNTER
"Last Written Prescription Date:  4/16/2022  Last Fill Quantity: 200,  # refills: 3   Last office visit provider:  10/26/2022     Requested Prescriptions   Pending Prescriptions Disp Refills     famotidine (PEPCID) 20 MG tablet [Pharmacy Med Name: Famotidine 20 MG Oral Tablet] 200 tablet 2     Sig: TAKE 1 TABLET BY MOUTH  TWICE DAILY       H2 Blockers Protocol Passed - 5/11/2023  4:26 AM        Passed - Patient is age 12 or older        Passed - Recent (12 mo) or future (30 days) visit within the authorizing provider's specialty     Patient has had an office visit with the authorizing provider or a provider within the authorizing providers department within the previous 12 mos or has a future within next 30 days. See \"Patient Info\" tab in [employer], or \"Choose Columns\" in Meds & Orders section of the refill encounter.              Passed - Medication is active on med list      Last Written Prescription Date:  4/16/2022  Last Fill Quantity: 100,  # refills: 3   Last office visit provider:  10/26/2022     omeprazole (PRILOSEC) 20 MG DR capsule [Pharmacy Med Name: Omeprazole 20 MG Oral Capsule Delayed Release] 100 capsule 2     Sig: TAKE 1 CAPSULE BY MOUTH  DAILY       PPI Protocol Passed - 5/11/2023  4:26 AM        Passed - Not on Clopidogrel (unless Pantoprazole ordered)        Passed - No diagnosis of osteoporosis on record        Passed - Recent (12 mo) or future (30 days) visit within the authorizing provider's specialty     Patient has had an office visit with the authorizing provider or a provider within the authorizing providers department within the previous 12 mos or has a future within next 30 days. See \"Patient Info\" tab in inb[employer], or \"Choose Columns\" in Meds & Orders section of the refill encounter.              Passed - Medication is active on med list        Passed - Patient is age 18 or older        Passed - No active pregnacy on record        Passed - No positive pregnancy test in past 12 months       "       Archana Harvey, RN 05/11/23 3:26 PM

## 2023-11-03 ENCOUNTER — TRANSFERRED RECORDS (OUTPATIENT)
Dept: HEALTH INFORMATION MANAGEMENT | Facility: CLINIC | Age: 73
End: 2023-11-03
Payer: COMMERCIAL

## 2023-11-07 ASSESSMENT — PATIENT HEALTH QUESTIONNAIRE - PHQ9
SUM OF ALL RESPONSES TO PHQ QUESTIONS 1-9: 5
SUM OF ALL RESPONSES TO PHQ QUESTIONS 1-9: 5
10. IF YOU CHECKED OFF ANY PROBLEMS, HOW DIFFICULT HAVE THESE PROBLEMS MADE IT FOR YOU TO DO YOUR WORK, TAKE CARE OF THINGS AT HOME, OR GET ALONG WITH OTHER PEOPLE: NOT DIFFICULT AT ALL

## 2023-11-07 ASSESSMENT — ENCOUNTER SYMPTOMS
EYE PAIN: 0
ABDOMINAL PAIN: 0
PALPITATIONS: 0
BREAST MASS: 0
CONSTIPATION: 0
NERVOUS/ANXIOUS: 1
HEARTBURN: 0
DIARRHEA: 0
FREQUENCY: 0
HEMATOCHEZIA: 0
WEAKNESS: 0
COUGH: 1
DYSURIA: 0
SORE THROAT: 0
CHILLS: 0
HEMATURIA: 0
MYALGIAS: 1
JOINT SWELLING: 0
FEVER: 0
PARESTHESIAS: 0
SHORTNESS OF BREATH: 0
NAUSEA: 0
DIZZINESS: 0
HEADACHES: 0
ARTHRALGIAS: 1

## 2023-11-07 ASSESSMENT — ACTIVITIES OF DAILY LIVING (ADL): CURRENT_FUNCTION: NO ASSISTANCE NEEDED

## 2023-11-08 ENCOUNTER — OFFICE VISIT (OUTPATIENT)
Dept: FAMILY MEDICINE | Facility: CLINIC | Age: 73
End: 2023-11-08
Payer: COMMERCIAL

## 2023-11-08 VITALS
WEIGHT: 170.8 LBS | HEIGHT: 62 IN | TEMPERATURE: 97.9 F | SYSTOLIC BLOOD PRESSURE: 138 MMHG | RESPIRATION RATE: 18 BRPM | BODY MASS INDEX: 31.43 KG/M2 | OXYGEN SATURATION: 97 % | HEART RATE: 65 BPM | DIASTOLIC BLOOD PRESSURE: 66 MMHG

## 2023-11-08 DIAGNOSIS — F32.A DEPRESSION, UNSPECIFIED DEPRESSION TYPE: ICD-10-CM

## 2023-11-08 DIAGNOSIS — K21.00 GASTROESOPHAGEAL REFLUX DISEASE WITH ESOPHAGITIS WITHOUT HEMORRHAGE: ICD-10-CM

## 2023-11-08 DIAGNOSIS — I77.810 DILATION OF THORACIC AORTA (H): ICD-10-CM

## 2023-11-08 DIAGNOSIS — K76.0 FATTY LIVER: ICD-10-CM

## 2023-11-08 DIAGNOSIS — E55.9 VITAMIN D DEFICIENCY: ICD-10-CM

## 2023-11-08 DIAGNOSIS — Z00.00 ENCOUNTER FOR MEDICARE ANNUAL WELLNESS EXAM: Primary | ICD-10-CM

## 2023-11-08 DIAGNOSIS — F32.0 CURRENT MILD EPISODE OF MAJOR DEPRESSIVE DISORDER, UNSPECIFIED WHETHER RECURRENT (H): ICD-10-CM

## 2023-11-08 DIAGNOSIS — Z12.31 VISIT FOR SCREENING MAMMOGRAM: ICD-10-CM

## 2023-11-08 DIAGNOSIS — R45.86 MOOD CHANGES: ICD-10-CM

## 2023-11-08 DIAGNOSIS — R73.09 OTHER ABNORMAL GLUCOSE: ICD-10-CM

## 2023-11-08 DIAGNOSIS — M85.80 OSTEOPENIA WITH HIGH RISK OF FRACTURE: ICD-10-CM

## 2023-11-08 DIAGNOSIS — E78.5 HYPERLIPIDEMIA LDL GOAL <130: ICD-10-CM

## 2023-11-08 DIAGNOSIS — Z29.11 NEED FOR VACCINATION AGAINST RESPIRATORY SYNCYTIAL VIRUS: ICD-10-CM

## 2023-11-08 LAB
ALBUMIN SERPL BCG-MCNC: 4.3 G/DL (ref 3.5–5.2)
ALP SERPL-CCNC: 71 U/L (ref 35–104)
ALT SERPL W P-5'-P-CCNC: 19 U/L (ref 0–50)
ANION GAP SERPL CALCULATED.3IONS-SCNC: 11 MMOL/L (ref 7–15)
AST SERPL W P-5'-P-CCNC: 20 U/L (ref 0–45)
BILIRUB SERPL-MCNC: 0.5 MG/DL
BUN SERPL-MCNC: 25.1 MG/DL (ref 8–23)
CALCIUM SERPL-MCNC: 9.6 MG/DL (ref 8.8–10.2)
CHLORIDE SERPL-SCNC: 101 MMOL/L (ref 98–107)
CHOLEST SERPL-MCNC: 240 MG/DL
CREAT SERPL-MCNC: 0.96 MG/DL (ref 0.51–0.95)
DEPRECATED HCO3 PLAS-SCNC: 27 MMOL/L (ref 22–29)
EGFRCR SERPLBLD CKD-EPI 2021: 62 ML/MIN/1.73M2
ERYTHROCYTE [DISTWIDTH] IN BLOOD BY AUTOMATED COUNT: 12.1 % (ref 10–15)
GLUCOSE SERPL-MCNC: 105 MG/DL (ref 70–99)
HBA1C MFR BLD: 5.5 % (ref 0–5.6)
HCT VFR BLD AUTO: 43.5 % (ref 35–47)
HDLC SERPL-MCNC: 49 MG/DL
HGB BLD-MCNC: 15.1 G/DL (ref 11.7–15.7)
LDLC SERPL CALC-MCNC: 154 MG/DL
MCH RBC QN AUTO: 32.1 PG (ref 26.5–33)
MCHC RBC AUTO-ENTMCNC: 34.7 G/DL (ref 31.5–36.5)
MCV RBC AUTO: 93 FL (ref 78–100)
NONHDLC SERPL-MCNC: 191 MG/DL
PLATELET # BLD AUTO: 232 10E3/UL (ref 150–450)
POTASSIUM SERPL-SCNC: 4.6 MMOL/L (ref 3.4–5.3)
PROT SERPL-MCNC: 7.3 G/DL (ref 6.4–8.3)
RBC # BLD AUTO: 4.7 10E6/UL (ref 3.8–5.2)
SODIUM SERPL-SCNC: 139 MMOL/L (ref 135–145)
TRIGL SERPL-MCNC: 187 MG/DL
TSH SERPL DL<=0.005 MIU/L-ACNC: 0.62 UIU/ML (ref 0.3–4.2)
VIT D+METAB SERPL-MCNC: 69 NG/ML (ref 20–50)
WBC # BLD AUTO: 6.2 10E3/UL (ref 4–11)

## 2023-11-08 PROCEDURE — 36415 COLL VENOUS BLD VENIPUNCTURE: CPT | Performed by: FAMILY MEDICINE

## 2023-11-08 PROCEDURE — 99214 OFFICE O/P EST MOD 30 MIN: CPT | Mod: 25 | Performed by: FAMILY MEDICINE

## 2023-11-08 PROCEDURE — 80061 LIPID PANEL: CPT | Performed by: FAMILY MEDICINE

## 2023-11-08 PROCEDURE — 85027 COMPLETE CBC AUTOMATED: CPT | Performed by: FAMILY MEDICINE

## 2023-11-08 PROCEDURE — 84443 ASSAY THYROID STIM HORMONE: CPT | Performed by: FAMILY MEDICINE

## 2023-11-08 PROCEDURE — 80053 COMPREHEN METABOLIC PANEL: CPT | Performed by: FAMILY MEDICINE

## 2023-11-08 PROCEDURE — 83036 HEMOGLOBIN GLYCOSYLATED A1C: CPT | Performed by: FAMILY MEDICINE

## 2023-11-08 PROCEDURE — 82306 VITAMIN D 25 HYDROXY: CPT | Performed by: FAMILY MEDICINE

## 2023-11-08 PROCEDURE — G0439 PPPS, SUBSEQ VISIT: HCPCS | Performed by: FAMILY MEDICINE

## 2023-11-08 RX ORDER — FLUOXETINE 10 MG/1
CAPSULE ORAL
Qty: 90 CAPSULE | Refills: 3 | Status: SHIPPED | OUTPATIENT
Start: 2023-11-08 | End: 2023-11-08

## 2023-11-08 RX ORDER — UBIDECARENONE 100 MG
100 CAPSULE ORAL DAILY
COMMUNITY

## 2023-11-08 RX ORDER — FAMOTIDINE 20 MG/1
20 TABLET, FILM COATED ORAL 2 TIMES DAILY
Qty: 180 TABLET | Refills: 3 | Status: SHIPPED | OUTPATIENT
Start: 2023-11-08

## 2023-11-08 RX ORDER — FLUOXETINE 10 MG/1
CAPSULE ORAL
Qty: 90 CAPSULE | Refills: 3 | Status: SHIPPED | OUTPATIENT
Start: 2023-11-08

## 2023-11-08 RX ORDER — RESPIRATORY SYNCYTIAL VIRUS VACCINE 120MCG/0.5
0.5 KIT INTRAMUSCULAR ONCE
Qty: 1 EACH | Refills: 0 | Status: CANCELLED | OUTPATIENT
Start: 2023-11-08 | End: 2023-11-08

## 2023-11-08 RX ORDER — FAMOTIDINE 20 MG/1
20 TABLET, FILM COATED ORAL 2 TIMES DAILY
Qty: 180 TABLET | Refills: 3 | Status: SHIPPED | OUTPATIENT
Start: 2023-11-08 | End: 2023-11-08

## 2023-11-08 ASSESSMENT — ENCOUNTER SYMPTOMS
DYSURIA: 0
DIARRHEA: 0
ABDOMINAL PAIN: 0
PARESTHESIAS: 0
HEADACHES: 0
COUGH: 1
CHILLS: 0
PALPITATIONS: 0
SHORTNESS OF BREATH: 0
ARTHRALGIAS: 1
NAUSEA: 0
MYALGIAS: 1
CONSTIPATION: 0
BREAST MASS: 0
HEMATURIA: 0
FEVER: 0
JOINT SWELLING: 0
SORE THROAT: 0
NERVOUS/ANXIOUS: 1
DIZZINESS: 0
HEARTBURN: 0
FREQUENCY: 0
WEAKNESS: 0
EYE PAIN: 0
HEMATOCHEZIA: 0

## 2023-11-08 ASSESSMENT — ACTIVITIES OF DAILY LIVING (ADL): CURRENT_FUNCTION: NO ASSISTANCE NEEDED

## 2023-11-08 NOTE — LETTER
My Depression Action Plan  Name: Tayler Mcmahon   Date of Birth 1950  Date: 11/8/2023    My doctor: Rissa Corral   [address] [facility] [address]  [phone]            GREEN    ZONE   Good Control    What it looks like:   Things are going generally well. You have normal ups and downs. You may even feel depressed from time to time, but bad moods usually last less than a day.   What you need to do:  Continue to care for yourself (see self care plan)  Check your depression survival kit and update it as needed  Follow your physician s recommendations including any medication.  Do not stop taking medication unless you consult with your physician first.             YELLOW         ZONE Getting Worse    What it looks like:   Depression is starting to interfere with your life.   It may be hard to get out of bed; you may be starting to isolate yourself from others.  Symptoms of depression are starting to last most all day and this has happened for several days.   You may have suicidal thoughts but they are not constant.   What you need to do:     Call your care team. Your response to treatment will improve if you keep your care team informed of your progress. Yellow periods are signs an adjustment may need to be made.     Continue your self-care.  Just get dressed and ready for the day.  Don't give yourself time to talk yourself out of it.    Talk to someone in your support network.    Open up your Depression Self-Care Plan/Wellness Kit.             RED    ZONE Medical Alert - Get Help    What it looks like:   Depression is seriously interfering with your life.   You may experience these or other symptoms: You can t get out of bed most days, can t work or engage in other necessary activities, you have trouble taking care of basic hygiene, or basic responsibilities, thoughts of suicide or death that will not go away, self-injurious  behavior.     What you need to do:  Call your care team and request a same-day appointment. If they are not available (weekends or after hours) call your local crisis line, emergency room or 911.          Depression Self-Care Plan / Wellness Kit    Many people find that medication and therapy are helpful treatments for managing depression. In addition, making small changes to your everyday life can help to boost your mood and improve your wellbeing. Below are some tips for you to consider. Be sure to talk with your medical provider and/or behavioral health consultant if your symptoms are worsening or not improving.     Sleep   Sleep hygiene  means all of the habits that support good, restful sleep. It includes maintaining a consistent bedtime and wake time, using your bedroom only for sleeping or sex, and keeping the bedroom dark and free of distractions like a computer, smartphone, or television.     Develop a Healthy Routine  Maintain good hygiene. Get out of bed in the morning, make your bed, brush your teeth, take a shower, and get dressed. Don t spend too much time viewing media that makes you feel stressed. Find time to relax each day.    Exercise  Get some form of exercise every day. This will help reduce pain and release endorphins, the  feel good  chemicals in your brain. It can be as simple as just going for a walk or doing some gardening, anything that will get you moving.      Diet  Strive to eat healthy foods, including fruits and vegetables. Drink plenty of water. Avoid excessive sugar, caffeine, alcohol, and other mood-altering substances.     Stay Connected with Others  Stay in touch with friends and family members.    Manage Your Mood  Try deep breathing, massage therapy, biofeedback, or meditation. Take part in fun activities when you can. Try to find something to smile about each day.     Psychotherapy  Be open to working with a therapist if your provider recommends it.     Medication  Be sure to  take your medication as prescribed. Most anti-depressants need to be taken every day. It usually takes several weeks for medications to work. Not all medicines work for all people. It is important to follow-up with your provider to make sure you have a treatment plan that is working for you. Do not stop your medication abruptly without first discussing it with your provider.    Crisis Resources   These hotlines are for both adults and children. They and are open 24 hours a day, 7 days a week unless noted otherwise.    National Suicide Prevention Lifeline   988 or 0-184-108-VPEK (3060)    Crisis Text Line    www.crisistextline.org  Text HOME to 520316 from anywhere in the [place], anytime, about any type of crisis. A live, trained crisis counselor will receive the text and respond quickly.    [facility] Lifeline for LGBTQ Youth  A national crisis intervention and suicide lifeline for LGBTQ youth under 25. Provides a safe place to talk without judgement. Call 1-404.731.9018; text START to 002089 or visit www.thetrevorproject.org to talk to a trained counselor.    For [facility] crisis numbers, visit the [facility] website at:  https://[place].gov/dhs/people-we-serve/adults/health-care/mental-health/resources/crisis-contacts.jsp

## 2023-11-08 NOTE — PROGRESS NOTES
"SUBJECTIVE:   Tayler is a 73 year old who presents for Preventive Visit.      Are you in the first 12 months of your Medicare coverage?  No    Healthy Habits:     In general, how would you rate your overall health?  Good    Frequency of exercise:  1 day/week    Duration of exercise:  15-30 minutes    Do you usually eat at least 4 servings of fruit and vegetables a day, include whole grains    & fiber and avoid regularly eating high fat or \"junk\" foods?  No    Taking medications regularly:  Yes    Medication side effects:  None    Ability to successfully perform activities of daily living:  No assistance needed    Home Safety:  No safety concerns identified    Hearing Impairment:  Difficulty following a conversation in a noisy restaurant or crowded room, feel that people are mumbling or not speaking clearly, need to ask people to speak up or repeat themselves and difficulty understanding soft or whispered speech    In the past 6 months, have you been bothered by leaking of urine? Yes    In general, how would you rate your overall mental or emotional health?  Fair    Mood:  Down.  Some issues with her daughter in law.  Fluoxetine increaed from 20 to 30 mg within the last year.  She does not want to go any higher at this point.  She be agreeable to counseling.    Chronic cough:  Saw ENT and will see pulmonary (sleep doctor).  Feels she has post nasal drip.  Second hand smoke exposre as a child.  She does have pulmonary nodules but they have been stable on CT for 2 years.    Ostepenia with high risk of fracture: He did not see the specialist because she does not wish to be on a medication.    Mildly dilated thoracic aorta: This has been stable on chest CT but if she is going to have another chest CT per pulmonary they could possibly order with contrast to reassess this as well.    Mild left ventricular hypertrophy: She did have a echo in 2020 there is no change from 2014.    Blood pressure is within normal limits today.  " He is not on blood pressure medication.      Today's PHQ-9 Score:       11/7/2023    11:07 AM   PHQ-9 SCORE   PHQ-9 Total Score MyChart 5 (Mild depression)   PHQ-9 Total Score 5           Have you ever done Advance Care Planning? (For example, a Health Directive, POLST, or a discussion with a medical provider or your loved ones about your wishes): Yes, patient states has an Advance Care Planning document and will bring a copy to the clinic.       Fall risk  Fallen 2 or more times in the past year?: No  Any fall with injury in the past year?: No    Cognitive Screening   1) Repeat 3 items (Leader, Season, Table)    2) Clock draw: NORMAL  3) 3 item recall: Recalls 3 objects  Results: 3 items recalled: COGNITIVE IMPAIRMENT LESS LIKELY    Mini-CogTM Copyright S Garrett. Licensed by the author for use in [facility]; reprinted with permission (mason@[facility]). All rights reserved.          Reviewed and updated as needed this visit by clinical staff   Tobacco  Allergies  Meds              Reviewed and updated as needed this visit by Provider                 Social History     Tobacco Use    Smoking status: Never    Smokeless tobacco: Never   Substance Use Topics    Alcohol use: No             11/7/2023    11:21 AM   Alcohol Use   Prescreen: >3 drinks/day or >7 drinks/week? No     Do you have a current opioid prescription? No  Do you use any other controlled substances or medications that are not prescribed by a provider? None              Current providers sharing in care for this patient include:   Patient Care Team:  Rissa Corral MD as PCP - General (Family Medicine)  Rissa Corral MD as Assigned PCP  Klever Burnette MD as MD (Endocrinology, Diabetes, and Metabolism)    The following health maintenance items are reviewed in Epic and correct as of today:  Health Maintenance   Topic Date Due    DEPRESSION ACTION PLAN  Today    RSV VACCINE (Pregnancy & 60+) (1 - 1-dose 60+ series) To check at a  "pharmacy if wishes    COVID-19 Vaccine (6 - 2023-24 season) Completed    ANNUAL REVIEW OF HM ORDERS  Today    MEDICARE ANNUAL WELLNESS VISIT  Today    PHQ-9  Today    FALL RISK ASSESSMENT  Today    MAMMO SCREENING  11/17/2023, ordered    COLORECTAL CANCER SCREENING  04/26/2027    LIPID  Today    ADVANCE CARE PLANNING  We would like a copy please    DTAP/TDAP/TD IMMUNIZATION (3 - Td or Tdap) 05/08/2028    DEXA  11/17/2024 if wishes    HEPATITIS C SCREENING  Completed    INFLUENZA VACCINE  Completed    Pneumococcal Vaccine: 65+ Years  Completed    ZOSTER IMMUNIZATION  Completed    IPV IMMUNIZATION  Aged Out    HPV IMMUNIZATION  Aged Out    MENINGITIS IMMUNIZATION  Aged Out    RSV MONOCLONAL ANTIBODY  NA     Lab work is in process          Review of Systems   Constitutional:  Negative for chills and fever.   HENT:  Positive for hearing loss. Negative for congestion, ear pain and sore throat.    Eyes:  Negative for pain and visual disturbance.   Respiratory:  Positive for cough. Negative for shortness of breath.    Cardiovascular:  Negative for chest pain, palpitations and peripheral edema.   Gastrointestinal:  Negative for abdominal pain, constipation, diarrhea, heartburn, hematochezia and nausea.   Breasts:  Negative for tenderness, breast mass and discharge.   Genitourinary:  Negative for dysuria, frequency, genital sores, hematuria, pelvic pain, urgency, vaginal bleeding and vaginal discharge.   Musculoskeletal:  Positive for arthralgias and myalgias. Negative for joint swelling.   Skin:  Negative for rash.   Neurological:  Negative for dizziness, weakness, headaches and paresthesias.   Psychiatric/Behavioral:  Positive for mood changes. The patient is nervous/anxious.          OBJECTIVE:   /66 (BP Location: Left arm, Patient Position: Sitting, Cuff Size: Adult Regular)   Pulse 65   Temp 97.9  F (36.6  C) (Oral)   Resp 18   Ht 1.565 m (5' 1.61\")   Wt 77.5 kg (170 lb 12.8 oz)   LMP  (LMP Unknown)   SpO2 " "97%   BMI 31.63 kg/m   Estimated body mass index is 31.63 kg/m  as calculated from the following:    Height as of this encounter: 1.565 m (5' 1.61\").    Weight as of this encounter: 77.5 kg (170 lb 12.8 oz).  Physical Exam  GENERAL: healthy, alert and no distress  EYES: Eyes grossly normal to inspection, PERRL and conjunctivae and sclerae normal  HENT: ear canals and TM's normal, nose and mouth without ulcers or lesions  NECK: no adenopathy, no asymmetry, masses, or scars and thyroid normal to palpation  RESP: lungs clear to auscultation - no rales, rhonchi or wheezes  BREAST: normal without masses, tenderness or nipple discharge and no palpable axillary masses or adenopathy  CV: regular rate and rhythm, normal S1 S2, no S3 or S4, no murmur, click or rub, no peripheral edema and peripheral pulses strong  ABDOMEN: soft, nontender, no hepatosplenomegaly, no masses and bowel sounds normal  MS: no gross musculoskeletal defects noted, no edema  SKIN: no suspicious lesions or rashes, but full body skin check not performed  NEURO: Normal strength and tone, mentation intact and speech normal  PSYCH: mentation appears normal, affect normal/bright    Diagnostic Test Results:  Labs reviewed in Epic    ASSESSMENT / PLAN:       ICD-10-CM    1. Encounter for Medicare annual wellness exam  Z00.00       2. Gastroesophageal reflux disease with esophagitis without hemorrhage  K21.00 CBC with platelets     famotidine (PEPCID) 20 MG tablet     omeprazole (PRILOSEC) 20 MG DR capsule     CBC with platelets     DISCONTINUED: famotidine (PEPCID) 20 MG tablet     DISCONTINUED: omeprazole (PRILOSEC) 20 MG DR capsule      3. Mood changes  R45.86 FLUoxetine (PROZAC) 20 MG capsule     DISCONTINUED: FLUoxetine (PROZAC) 20 MG capsule      4. Depression, unspecified depression type  F32.A FLUoxetine (PROZAC) 10 MG capsule     DISCONTINUED: FLUoxetine (PROZAC) 10 MG capsule      5. Need for vaccination against respiratory syncytial virus  Z29.11  "      6. Current mild episode of major depressive disorder, unspecified whether recurrent (H24)  F32.0 Adult Mental Health  Referral     TSH with free T4 reflex     TSH with free T4 reflex      7. Dilation of thoracic aorta (H24)  I77.810       8. Fatty liver-seen on US in 2019  K76.0 Comprehensive metabolic panel (BMP + Alb, Alk Phos, ALT, AST, Total. Bili, TP)     Comprehensive metabolic panel (BMP + Alb, Alk Phos, ALT, AST, Total. Bili, TP)      9. Prediabetes  R73.09 Hemoglobin A1c     Hemoglobin A1c      10. Osteopenia with high risk of fracture  M85.80       11. Hyperlipidemia LDL goal <130  E78.5 Lipid panel reflex to direct LDL Fasting     Lipid panel reflex to direct LDL Fasting      12. Visit for screening mammogram  Z12.31 MA Screen Bilateral w/Iglesia      13. Vitamin D deficiency  E55.9 Vitamin D Deficiency     Vitamin D Deficiency        Recommend 3 dairy servings a day or calcium with vitamin D twice a day with food.    I am placing a referral to counseling.  I think it can only be helpful not harmful.    You are currently on fluoxetine 30 mg if you wish to increase that to 40 mg please let me know.\    Please mention your chronic cough to your pulmonary doctor and ask if another chest CT is needed and also ask them about the possible pulmonary hypertension.    If pulmonary is ordered a chest CT asked her to order it with contrast so we can look at the mildly dilated thoracic aorta.    See Dermatology yearly for full body skin check.    I inadvertently sent your meds to [facility] if they call you do not pick them up.  I now sent them to your mail away.    Per recent antibody testing you are not immune to hepatitis B.  You could get hepatitis B shot if you wish and check with your insurance and at the pharmacy.    For the mild fatty liver we recommend healthy diet and exercise.    Consider rechecking an echo in 2025 for mild left ventricular hypertrophy.  We will continue to monitor blood  pressure.    The total time spent preparing to see this patient, face-to-face time and coordination of care time on the same calendar date for this visit was 40 minutes, 10 minutes of which was for the Medicare wellness visit.      Patient has been advised of split billing requirements and indicates understanding: Yes      COUNSELING:  Reviewed preventive health counseling, as reflected in patient instructions       Regular exercise       Healthy diet/nutrition        She reports that she has never smoked. She has never used smokeless tobacco.      Appropriate preventive services were discussed with this patient, including applicable screening as appropriate for fall prevention, nutrition, physical activity, Tobacco-use cessation, weight loss and cognition.  Checklist reviewing preventive services available has been given to the patient.    Reviewed patients plan of care and provided an AVS. The Basic Care Plan (routine screening as documented in Health Maintenance) for Tayler meets the Care Plan requirement. This Care Plan has been established and reviewed with the Patient.          Rissa Corral MD  [facility]    Identified Health Risks:  I have reviewed Opioid Use Disorder and Substance Use Disorder risk factors and made any needed referrals.   Answers submitted by the patient for this visit:  Patient Health Questionnaire (Submitted on 11/7/2023)  If you checked off any problems, how difficult have these problems made it for you to do your work, take care of things at home, or get along with other people?: Not difficult at all  PHQ9 TOTAL SCORE: 5

## 2023-11-08 NOTE — PROGRESS NOTES
"She is at risk for lack of exercise and has been provided with information to increase physical activity for the benefit of her well-being.  The patient was counseled and encouraged to consider modifying their diet and eating habits. She was provided with information on recommended healthy diet options.  The patient was provided with written information regarding signs of hearing loss.  Information on urinary incontinence and treatment options given to patient.  The patient was provided with suggestions to help her develop a healthy emotional lifestyle.  The patient's PHQ-9 score is consistent with mild depression. She was provided with information regarding depression and was advised to schedule a follow up appointment in next visit weeks to further address this issue.  Answers submitted by the patient for this visit:  Patient Health Questionnaire (Submitted on 11/7/2023)  If you checked off any problems, how difficult have these problems made it for you to do your work, take care of things at home, or get along with other people?: Not difficult at all  PHQ9 TOTAL SCORE: 5  Annual Preventive Visit (Submitted on 11/7/2023)  Chief Complaint: Annual Exam:  In general, how would you rate your overall physical health?: good  Frequency of exercise:: 1 day/week  Do you usually eat at least 4 servings of fruit and vegetables a day, include whole grains & fiber, and avoid regularly eating high fat or \"junk\" foods? : No  Taking medications regularly:: Yes  Medication side effects:: None  Activities of Daily Living: no assistance needed  Home safety: no safety concerns identified  Hearing Impairment:: difficulty following a conversation in a noisy restaurant or crowded room, feel that people are mumbling or not speaking clearly, need to ask people to speak up or repeat themselves, difficulty understanding soft or whispered speech  In the past 6 months, have you been bothered by leaking of urine?: Yes  abdominal pain: " No  Blood in stool: No  Blood in urine: No  chest pain: No  chills: No  congestion: No  constipation: No  cough: Yes  diarrhea: No  dizziness: No  ear pain: No  eye pain: No  nervous/anxious: Yes  fever: No  frequency: No  genital sores: No  headaches: No  hearing loss: Yes  heartburn: No  arthralgias: Yes  joint swelling: No  peripheral edema: No  mood changes: Yes  myalgias: Yes  nausea: No  dysuria: No  palpitations: No  Skin sensation changes: No  sore throat: No  urgency: No  rash: No  shortness of breath: No  visual disturbance: No  weakness: No  pelvic pain: No  vaginal bleeding: No  vaginal discharge: No  tenderness: No  breast mass: No  breast discharge: No  In general, how would you rate your overall mental or emotional health?: fair  Exercise outside of work (Submitted on 11/7/2023)  Chief Complaint: Annual Exam:  Duration of exercise:: 15-30 minutes

## 2023-11-08 NOTE — PATIENT INSTRUCTIONS
Recommend 3 dairy servings a day or calcium with vitamin D twice a day with food.    I am placing a referral to counseling.  I think it can only be helpful not harmful.    You are currently on fluoxetine 30 mg if you wish to increase that to 40 mg please let me know.\    Please mention your chronic cough to your pulmonary doctor and ask if another chest CT is needed and also ask them about the possible pulmonary hypertension.    If pulmonary is ordered a chest CT asked her to order it with contrast so we can look at the mildly dilated thoracic aorta.    See Dermatology yearly for full body skin check.    I inadvertently sent your meds to [facility] if they call you do not pick them up.  I now sent them to your mail away.    Per recent antibody testing you are not immune to hepatitis B.  You could get hepatitis B shot if you wish and check with your insurance and at the pharmacy.    For the mild fatty liver we recommend healthy diet and exercise.    Consider rechecking an echo in 2025 for mild left ventricular hypertrophy.  We will continue to monitor blood pressure.      Patient Education   Personalized Prevention Plan  You are due for the preventive services outlined below.  Your care team is available to assist you in scheduling these services.  If you have already completed any of these items, please share that information with your care team to update in your medical record.      Health Maintenance   Topic Date Due    DEPRESSION ACTION PLAN  Today    RSV VACCINE (Pregnancy & 60+) (1 - 1-dose 60+ series) To check at a pharmacy if wishes    COVID-19 Vaccine (6 - 2023-24 season) Completed    ANNUAL REVIEW OF HM ORDERS  Today    MEDICARE ANNUAL WELLNESS VISIT  Today    PHQ-9  Today    FALL RISK ASSESSMENT  Today    MAMMO SCREENING  11/17/2023, ordered    COLORECTAL CANCER SCREENING  04/26/2027    LIPID  Today    ADVANCE CARE PLANNING  We would like a copy please    DTAP/TDAP/TD IMMUNIZATION (3 - Td or Tdap) 05/08/2028  "   DEXA  11/17/2024 if wishes    HEPATITIS C SCREENING  Completed    INFLUENZA VACCINE  Completed    Pneumococcal Vaccine: 65+ Years  Completed    ZOSTER IMMUNIZATION  Completed    IPV IMMUNIZATION  Aged Out    HPV IMMUNIZATION  Aged Out    MENINGITIS IMMUNIZATION  Aged Out    RSV MONOCLONAL ANTIBODY  NA     Learning About Being Physically Active  What is physical activity?     Being physically active means doing any kind of activity that gets your body moving.  The types of physical activity that can help you get fit and stay healthy include:  Aerobic or \"cardio\" activities. These make your heart beat faster and make you breathe harder, such as brisk walking, riding a bike, or running. They strengthen your heart and lungs and build up your endurance.  Strength training activities. These make your muscles work against, or \"resist,\" something. Examples include lifting weights or doing push-ups. These activities help tone and strengthen your muscles and bones.  Stretches. These let you move your joints and muscles through their full range of motion. Stretching helps you be more flexible.  Reaching a balance between these three types of physical activity is important because each one contributes to your overall fitness.  What are the benefits of being active?  Being active is one of the best things you can do for your health. It helps you to:  Feel stronger and have more energy to do all the things you like to do.  Focus better at school or work.  Feel, think, and sleep better.  Reach and stay at a healthy weight.  Lose fat and build lean muscle.  Lower your risk for serious health problems, including diabetes, heart attack, high blood pressure, and some cancers.  Keep your heart, lungs, bones, muscles, and joints strong and healthy.  How can you make being active part of your life?  Start slowly. Make it your long-term goal to get at least 30 minutes of exercise on most days of the week. Walking is a good choice. You " "also may want to do other activities, such as running, swimming, cycling, or playing tennis or team sports.  Pick activities that you like--ones that make your heart beat faster, your muscles stronger, and your muscles and joints more flexible. If you find more than one thing you like doing, do them all. You don't have to do the same thing every day.  Get your heart pumping every day. Any activity that makes your heart beat faster and keeps it at that rate for a while counts.  Here are some great ways to get your heart beating faster:  Go for a brisk walk, run, or hike.  Go for a swim or bike ride.  Take an online exercise class or dance.  Play a game of touch football, basketball, or soccer.  Play tennis, pickleball, or racquetball.  Climb stairs.  Even some household chores can be aerobic. Just do them at a faster pace. Raking or mowing the lawn, sweeping the garage, and vacuuming and cleaning your home all can help get your heart rate up.  Strengthen your muscles during the week. You don't have to lift heavy weights or grow big, bulky muscles to get stronger. Doing a few simple activities that make your muscles work against, or \"resist,\" something can help you get stronger. Aim for at least twice a week.  For example, you can:  Do push-ups or sit-ups, which use your own body weight as resistance.  Lift weights or dumbbells or use stretch bands at home or in a gym or community center.  Stretch your muscles often. Stretching will help you as you become more active. It can help you stay flexible and loosen tight muscles. It can also help improve your balance and posture and can be a great way to relax.  Be sure to stretch the muscles you'll be using when you work out. It's best to warm your muscles slightly before you stretch them. Walk or do some other light aerobic activity for a few minutes. Then start stretching.  When you stretch your muscles:  Do it slowly. Stretching is not about going fast or making sudden " "movements.  Don't push or bounce during a stretch.  Hold each stretch for at least 15 to 30 seconds, if you can. You should feel a stretch in the muscle, but not pain.  Breathe out as you do the stretch. Then breathe in as you hold the stretch. Don't hold your breath.  If you're worried about how more activity might affect your health, have a checkup before you start. Follow any special advice your doctor gives you for getting a smart start.  Where can you learn more?  Go to https://www.[employer].net/patiented  Enter W332 in the search box to learn more about \"Learning About Being Physically Active.\"  Current as of: June 6, 2023               Content Version: 13.8    1511-1190 [employer].   Care instructions adapted under license by your healthcare professional. If you have questions about a medical condition or this instruction, always ask your healthcare professional. [employer] disclaims any warranty or liability for your use of this information.      Learning About Dietary Guidelines  What are the Dietary Guidelines for Americans?     Dietary Guidelines for Americans provide tips for eating well and staying healthy. This helps reduce the risk for long-term (chronic) diseases.  These guidelines recommend that you:  Eat and drink the right amount for you. The U.[place] government's food guide is called MyPlate. It can help you make your own well-balanced eating plan.  Try to balance your eating with your activity. This helps you stay at a healthy weight.  Drink alcohol in moderation, if at all.  Limit foods high in salt, saturated fat, trans fat, and added sugar.  These guidelines are from the U.S. Department of Agriculture and the U.S. Department of Health and Human Services. They are updated every 5 years.  What is MyPlate?  MyPlate is the U.[place] government's food guide. It can help you make your own well-balanced eating plan. A balanced eating plan means that you eat enough, but not too " "much, and that your food gives you the nutrients you need to stay healthy.  MyPlate focuses on eating plenty of whole grains, fruits, and vegetables, and on limiting fat and sugar. It is available online at www.ChooseMyPlate.gov.  How can you get started?  If you're trying to eat healthier, you can slowly change your eating habits over time. You don't have to make big changes all at once. Start by adding one or two healthy foods to your meals each day.  Grains  Choose whole-grain breads and cereals and whole-wheat pasta and whole-grain crackers.  Vegetables  Eat a variety of vegetables every day. They have lots of nutrients and are part of a heart-healthy diet.  Fruits  Eat a variety of fruits every day. Fruits contain lots of nutrients. Choose fresh fruit instead of fruit juice.  Protein foods  Choose fish and lean poultry more often. Eat red meat and fried meats less often. Dried beans, tofu, and nuts are also good sources of protein.  Dairy  Choose low-fat or fat-free products from this food group. If you have problems digesting milk, try eating cheese or yogurt instead.  Fats and oils  Limit fats and oils if you're trying to cut calories. Choose healthy fats when you cook. These include canola oil and olive oil.  Where can you learn more?  Go to https://www.[employer].net/patiented  Enter D676 in the search box to learn more about \"Learning About Dietary Guidelines.\"  Current as of: February 28, 2023               Content Version: 13.8    0411-5498 [employer].   Care instructions adapted under license by your healthcare professional. If you have questions about a medical condition or this instruction, always ask your healthcare professional. [employer] disclaims any warranty or liability for your use of this information.      Hearing Loss: Care Instructions  Overview     Hearing loss is a sudden or slow decrease in how well you hear. It can range from slight to profound. Permanent " hearing loss can occur with aging. It also can happen when you are exposed long-term to loud noise. Examples include listening to loud music, riding motorcycles, or being around other loud machines.  Hearing loss can affect your work and home life. It can make you feel lonely or depressed. You may feel that you have lost your independence. But hearing aids and other devices can help you hear better and feel connected to others.  Follow-up care is a key part of your treatment and safety. Be sure to make and go to all appointments, and call your doctor if you are having problems. It's also a good idea to know your test results and keep a list of the medicines you take.  How can you care for yourself at home?  Avoid loud noises whenever possible. This helps keep your hearing from getting worse.  Always wear hearing protection around loud noises.  Wear a hearing aid as directed.  A professional can help you pick a hearing aid that will work best for you.  You can also get hearing aids over the counter for mild to moderate hearing loss.  Have hearing tests as your doctor suggests. They can show whether your hearing has changed. Your hearing aid may need to be adjusted.  Use other devices as needed. These may include:  Telephone amplifiers and hearing aids that can connect to a television, stereo, radio, or microphone.  Devices that use lights or vibrations. These alert you to the doorbell, a ringing telephone, or a baby monitor.  Television closed-captioning. This shows the words at the bottom of the screen. Most new TVs can do this.  TTY (text telephone). This lets you type messages back and forth on the telephone instead of talking or listening. These devices are also called TDD. When messages are typed on the keyboard, they are sent over the phone line to a receiving TTY. The message is shown on a monitor.  Use text messaging, social media, and email if it is hard for you to communicate by telephone.  Try to learn a  "listening technique called speechreading. It is not lipreading. You pay attention to people's gestures, expressions, posture, and tone of voice. These clues can help you understand what a person is saying. Face the person you are talking to, and have them face you. Make sure the lighting is good. You need to see the other person's face clearly.  Think about counseling if you need help to adjust to your hearing loss.  When should you call for help?  Watch closely for changes in your health, and be sure to contact your doctor if:    You think your hearing is getting worse.     You have new symptoms, such as dizziness or nausea.   Where can you learn more?  Go to https://www.[employer].net/patiented  Enter R798 in the search box to learn more about \"Hearing Loss: Care Instructions.\"  Current as of: February 28, 2023               Content Version: 13.8    6022-6630 [employer]   Care instructions adapted under license by your healthcare professional. If you have questions about a medical condition or this instruction, always ask your healthcare professional. [employer] disclaims any warranty or liability for your use of this information.      Bladder Training: Care Instructions  Your Care Instructions     Bladder training is used to treat urge incontinence and stress incontinence. Urge incontinence means that the need to urinate comes on so fast that you can't get to a toilet in time. Stress incontinence means that you leak urine because of pressure on your bladder. For example, it may happen when you laugh, cough, or lift something heavy.  Bladder training can increase how long you can wait before you have to urinate. It can also help your bladder hold more urine. And it can give you better control over the urge to urinate.  It is important to remember that bladder training takes a few weeks to a few months to make a difference. You may not see results right away, but don't give " up.  Follow-up care is a key part of your treatment and safety. Be sure to make and go to all appointments, and call your doctor if you are having problems. It's also a good idea to know your test results and keep a list of the medicines you take.  How can you care for yourself at home?  Work with your doctor to come up with a bladder training program that is right for you. You may use one or more of the following methods.  Delayed urination  In the beginning, try to keep from urinating for 5 minutes after you first feel the need to go.  While you wait, take deep, slow breaths to relax. Kegel exercises can also help you delay the need to go to the bathroom.  After some practice, when you can easily wait 5 minutes to urinate, try to wait 10 minutes before you urinate.  Slowly increase the waiting period until you are able to control when you have to urinate.  Scheduled urination  Empty your bladder when you first wake up in the morning.  Schedule times throughout the day when you will urinate.  Start by going to the bathroom every hour, even if you don't need to go.  Slowly increase the time between trips to the bathroom.  When you have found a schedule that works well for you, keep doing it.  If you wake up during the night and have to urinate, do it. Apply your schedule to waking hours only.  Kegel exercises  These tighten and strengthen pelvic muscles, which can help you control the flow of urine. (If doing these exercises causes pain, stop doing them and talk with your doctor.) To do Kegel exercises:  Squeeze your muscles as if you were trying not to pass gas. Or squeeze your muscles as if you were stopping the flow of urine. Your belly, legs, and buttocks shouldn't move.  Hold the squeeze for 3 seconds, then relax for 5 to 10 seconds.  Start with 3 seconds, then add 1 second each week until you are able to squeeze for 10 seconds.  Repeat the exercise 10 times a session. Do 3 to 8 sessions a day.  When should you  "call for help?  Watch closely for changes in your health, and be sure to contact your doctor if:    Your incontinence is getting worse.     You do not get better as expected.   Where can you learn more?  Go to https://www.[employer].net/patiented  Enter V684 in the search box to learn more about \"Bladder Training: Care Instructions.\"  Current as of: February 28, 2023               Content Version: 13.8    7200-4852 [employer].   Care instructions adapted under license by your healthcare professional. If you have questions about a medical condition or this instruction, always ask your healthcare professional. [employer] disclaims any warranty or liability for your use of this information.      Your Health Risk Assessment indicates you feel you are not in good emotional health.    Recreation   Recreation is not limited to sports and team events. It includes any activity that provides relaxation, interest, enjoyment, and exercise. Recreation provides an outlet for physical, mental, and social energy. It can give a sense of worth and achievement. It can help you stay healthy.    Mental Exercise and Social Involvement  Mental and emotional health is as important as physical health. Keep in touch with friends and family. Stay as active as possible. Continue to learn and challenge yourself.   Things you can do to stay mentally active are:  Learn something new, like a foreign language or musical instrument.   Play [employer] or do crossword puzzles. If you cannot find people to play these games with you at home, you can play them with others on your computer through the Internet.   Join a games club--anything from card games to chess or checkers or lawn bowling.   Start a new hobby.   Go back to school.   Volunteer.   Read.   Keep up with world events.  Learning About Depression Screening  What is depression screening?  Depression screening is a way to see if you have depression symptoms. It may be " "done by a doctor or counselor. It's often part of a routine checkup. That's because your mental health is just as important as your physical health.  Depression is a mental health condition that affects how you feel, think, and act. You may:  Have less energy.  Lose interest in your daily activities.  Feel sad and grouchy for a long time.  Depression is very common. It affects people of all ages.  Many things can lead to depression. Some people become depressed after they have a stroke or find out they have a major illness like cancer or heart disease. The death of a loved one or a breakup may lead to depression. It can run in families. Most experts believe that a combination of inherited genes and stressful life events can cause it.  What happens during screening?  You may be asked to fill out a form about your depression symptoms. You and the doctor will discuss your answers. The doctor may ask you more questions to learn more about how you think, act, and feel.  What happens after screening?  If you have symptoms of depression, your doctor will talk to you about your options.  Doctors usually treat depression with medicines or counseling. Often, combining the two works best. Many people don't get help because they think that they'll get over the depression on their own. But people with depression may not get better unless they get treatment.  The cause of depression is not well understood. There may be many factors involved. But if you have depression, it's not your fault.  A serious symptom of depression is thinking about death or suicide. If you or someone you care about talks about this or about feeling hopeless, get help right away.  It's important to know that depression can be treated. Medicine, counseling, and self-care may help.  Where can you learn more?  Go to https://www.healthwise.net/patiented  Enter T185 in the search box to learn more about \"Learning About Depression Screening.\"  Current as of: " June 25, 2023               Content Version: 13.8    1647-7586 [employer].   Care instructions adapted under license by your healthcare professional. If you have questions about a medical condition or this instruction, always ask your healthcare professional. [employer] disclaims any warranty or liability for your use of this information.

## 2023-12-01 ENCOUNTER — ANCILLARY PROCEDURE (OUTPATIENT)
Dept: MAMMOGRAPHY | Facility: HOSPITAL | Age: 73
End: 2023-12-01
Attending: FAMILY MEDICINE
Payer: COMMERCIAL

## 2023-12-01 ENCOUNTER — TRANSFERRED RECORDS (OUTPATIENT)
Dept: HEALTH INFORMATION MANAGEMENT | Facility: CLINIC | Age: 73
End: 2023-12-01

## 2023-12-01 DIAGNOSIS — Z12.31 VISIT FOR SCREENING MAMMOGRAM: ICD-10-CM

## 2023-12-01 PROCEDURE — 77067 SCR MAMMO BI INCL CAD: CPT

## 2023-12-15 DIAGNOSIS — M62.830 BACK MUSCLE SPASM: ICD-10-CM

## 2023-12-15 RX ORDER — CYCLOBENZAPRINE HCL 10 MG
TABLET ORAL
Qty: 30 TABLET | Refills: 1 | Status: SHIPPED | OUTPATIENT
Start: 2023-12-15

## 2023-12-21 ENCOUNTER — NURSE TRIAGE (OUTPATIENT)
Dept: FAMILY MEDICINE | Facility: CLINIC | Age: 73
End: 2023-12-21

## 2023-12-21 NOTE — TELEPHONE ENCOUNTER
Nurse Triage SBAR    Is this a 2nd Level Triage? YES, LICENSED PRACTITIONER REVIEW IS REQUIRED    Situation: Patient called in stating that she had back pain that has now transition into nerve pain going down her right buttock and leg.     Background: Patient is a 73 year old female. She reports having had back pain the past, but not sciatica pain.     Assessment: Patient is wanting to be seen. Since this is a new symptom that she has developed, nurse encouraged her to be seen.     Protocol Recommended Disposition:   See in Office Today or Tomorrow    Recommendation: Please review and advise. Patient scheduled to see provider in [place] today.      Routed to provider    Does the patient meet one of the following criteria for ADS visit consideration? 16+ years old, with an [facility] PCP     TIP  Providers, please consider if this condition is appropriate for management at one of our Acute and Diagnostic Services sites.     If patient is a good candidate, please use dotphrase <dot>triageresponse and select Refer to ADS to document.       Reason for Disposition   Patient wants to be seen    Additional Information   Negative: Looks like a broken bone or dislocated joint (e.g., crooked or deformed)   Negative: Sounds like a life-threatening emergency to the triager   Negative: Followed a hip injury   Negative: Followed a knee injury   Negative: Followed an ankle or foot injury   Negative: Back pain radiating (shooting) into leg(s)   Negative: Foot pain is main symptom   Negative: Ankle pain is main symptom   Negative: Knee pain is main symptom   Negative: Leg swelling is main symptom   Negative: Chest pain   Negative: Difficulty breathing   Negative: Entire foot is cool or blue in comparison to other side   Negative: Unable to walk   Negative: Fever and red area (or area very tender to touch)   Negative: Swollen joint and fever   Negative: Thigh or calf pain in only one leg and present > 1 hour   Negative: Thigh, calf, or  ankle swelling in only one leg   Negative: Thigh, calf, or ankle swelling in both legs, but one side is definitely more swollen   Negative: History of prior 'blood clot' in leg or lungs (i.e., deep vein thrombosis, pulmonary embolism)   Negative: History of inherited increased risk of blood clots (e.g., factor 5 Leiden, antithrombin 3, protein C or protein S deficiency, prothrombin mutation)   Negative: Major surgery in past month   Negative: Hip or leg fracture (broken bone) in past month (or had cast on leg or ankle in past month)   Negative: Illness requiring prolonged bedrest in past month (e.g., immobilization, long hospital stay)   Negative: Long-distance travel in past month (e.g., car, bus, train, plane; with trip lasting 6 or more hours)   Negative: Cancer treatment in past six months (or has cancer now)   Negative: Patient sounds very sick or weak to the triager   Negative: SEVERE pain (e.g., excruciating, unable to do any normal activities)   Negative: Cast on leg or ankle and now has increasing pain   Negative: Red area or streak > 2 inches (or 5 cm)   Negative: Painful rash with multiple small blisters grouped together (i.e., dermatomal distribution or 'band' or 'stripe')   Negative: Localized rash is very painful (no fever)   Negative: Looks like a boil, infected sore, deep ulcer, or other infected rash (spreading redness, pus)   Negative: Numbness in a leg or foot (i.e., loss of sensation)   Negative: Localized pain, redness or hard lump along vein    Protocols used: Leg Pain-A-[place]

## 2024-02-26 ASSESSMENT — PATIENT HEALTH QUESTIONNAIRE - PHQ9
10. IF YOU CHECKED OFF ANY PROBLEMS, HOW DIFFICULT HAVE THESE PROBLEMS MADE IT FOR YOU TO DO YOUR WORK, TAKE CARE OF THINGS AT HOME, OR GET ALONG WITH OTHER PEOPLE: SOMEWHAT DIFFICULT
SUM OF ALL RESPONSES TO PHQ QUESTIONS 1-9: 4

## 2024-02-27 ENCOUNTER — VIRTUAL VISIT (OUTPATIENT)
Dept: PSYCHOLOGY | Facility: CLINIC | Age: 74
End: 2024-02-27
Attending: FAMILY MEDICINE
Payer: COMMERCIAL

## 2024-02-27 DIAGNOSIS — F43.23 ADJUSTMENT DISORDER WITH MIXED ANXIETY AND DEPRESSED MOOD: Primary | ICD-10-CM

## 2024-02-27 PROCEDURE — 90791 PSYCH DIAGNOSTIC EVALUATION: CPT | Mod: 95 | Performed by: SOCIAL WORKER

## 2024-02-27 ASSESSMENT — ANXIETY QUESTIONNAIRES: GAD7 TOTAL SCORE: 5

## 2024-02-27 NOTE — PROGRESS NOTES
"[facility]      PATIENT'S NAME: Tyaler Mcmahon  PREFERRED NAME: Tayler  PRONOUNS:  MRN: [id number]  : 1950  ADDRESS: [address] [place] [id number]  [facility]:  [id number]  DATE OF SERVICE: 24  START TIME: 11:06am  END TIME: 11:51am  PREFERRED PHONE: [phone]  May we leave a program related message: Yes  EMERGENCY CONTACT: was obtained.  SERVICE MODALITY:  Video Visit:      Provider verified identity through the following two step process.  Patient provided:  Patient  and Patient was verified at admission/transfer    Telemedicine Visit: The patient's condition can be safely assessed and treated via synchronous audio and visual telemedicine encounter.      Reason for Telemedicine Visit: Patient has requested telehealth visit    Originating Site (Patient Location): Patient's home    Distant Site (Provider Location): Provider Remote Setting- Home Office    Consent:  The patient/guardian has verbally consented to: the potential risks and benefits of telemedicine (video visit) versus in person care; bill my insurance or make self-payment for services provided; and responsibility for payment of non-covered services.     Patient would like the video invitation sent by:  Send to e-mail at: [email or web address]    Mode of Communication:  Video Conference via Amwell    Distant Location (Provider):  Off-site    As the provider I attest to compliance with applicable laws and regulations related to telemedicine.    UNIVERSAL ADULT Mental Health DIAGNOSTIC ASSESSMENT    Identifying Information:  Patient is a 74 year old,   individual. Patient was referred for an assessment by self. Patient attended the session alone.    Chief Complaint:   The reason for seeking services at this time is: \"Family relationships\". The problem(s) began 20.    Patient has not attempted to resolve these concerns in the past.    Social/Family History:  Patient reported that she grew up in " "[place], [place]. She was raised by her biological parents. Parents . Patient reported that her childhood was \"good overall.\" Patient described her current relationships with family of origin as positive and supportive with daughter, strained with her son and daughter in law, notes that both of her parents are , as well as her brother being .    The patient describes her cultural background as .  Cultural influences and impact on patient's life structure, values, norms, and healthcare: Scientologist Scientologist community, being different was not talked about.  My mother was very understanding, my father was distant. Contextual influences on patient's health include: Contextual Factors: Individual Factors history of anxiety and wanting to better manage stress and Family Factors Patient also reports some strain in her relationships with son and daughter in law, and not being able to see grandchildren recently, which has impacted her mental health .These factors will be addressed in the Preliminary Treatment plan. Patient identified her preferred language to be English. Patient reported that she does not need the assistance of an  or other support involved in therapy.     Patient reported had no significant delays in developmental tasks.  Patient's highest education level was college graduate.  Patient identified the following learning problems: none reported.  Modifications will not be used to assist communication in therapy. Patient reports they are  able to understand written materials.    Patient's current relationship status is has a partner or significant other. Patient identified her sexual orientation as ibrahim.  Patient reported having 2 child(callum). Patient identified partner; adult child; friends as part of her support system.  Patient identified the quality of these relationships as fair.      Patient's current living/housing situation involves staying in own home/apartment.  The " immediate members of family and household include Elyse Hendrix, 79,Partner  and she reports that housing is stable.    Patient is currently retired.  Patient reports that her finances are obtained through partner. Patient does not identify finances as a current stressor.      Patient reported that they have not been involved with the legal system. Patient does not report being under probation/ parole/ jurisdiction.    Patient's Strengths and Limitations:  Patient identified the following strengths or resources that will help them succeed in treatment: commitment to health and well being, friends / good social support, family support, insight, intelligence, and motivation. Things that may interfere with the patient's success in treatment include: none identified.     Assessments:  The following assessments were completed by patient for this visit:  PHQ9:       10/19/2020    11:00 AM 10/20/2021    11:57 AM 10/26/2022     8:15 AM 11/7/2023    11:07 AM 2/26/2024     4:54 PM   PHQ-9 SCORE   PHQ-9 Total Score MyChart   7 (Mild depression) 5 (Mild depression) 4 (Minimal depression)   PHQ-9 Total Score 2 0 7 5 4     GAD7:       10/26/2022     8:16 AM   DC-7 SCORE   Total Score 4 (minimal anxiety)   Total Score 4     CAGE-AID:       2/26/2024     5:51 PM   CAGE-AID Total Score   Total Score 0   Total Score MyChart 0 (A total score of 2 or greater is considered clinically significant)     PROMIS 10-Global Health (all questions and answers displayed):       2/26/2024     5:50 PM   PROMIS 10   In general, would you say your health is: Good   In general, would you say your quality of life is: Good   In general, how would you rate your physical health? Good   In general, how would you rate your mental health, including your mood and your ability to think? Good   In general, how would you rate your satisfaction with your social activities and relationships? Fair   In general, please rate how well you carry out your usual social  activities and roles Good   To what extent are you able to carry out your everyday physical activities such as walking, climbing stairs, carrying groceries, or moving a chair? Completely   In the past 7 days, how often have you been bothered by emotional problems such as feeling anxious, depressed, or irritable? Sometimes   In the past 7 days, how would you rate your fatigue on average? Mild   In the past 7 days, how would you rate your pain on average, where 0 means no pain, and 10 means worst imaginable pain? 4   In general, would you say your health is: 3   In general, would you say your quality of life is: 3   In general, how would you rate your physical health? 3   In general, how would you rate your mental health, including your mood and your ability to think? 3   In general, how would you rate your satisfaction with your social activities and relationships? 2   In general, please rate how well you carry out your usual social activities and roles. (This includes activities at home, at work and in your community, and responsibilities as a parent, child, spouse, employee, friend, etc.) 3   To what extent are you able to carry out your everyday physical activities such as walking, climbing stairs, carrying groceries, or moving a chair? 5   In the past 7 days, how often have you been bothered by emotional problems such as feeling anxious, depressed, or irritable? 3   In the past 7 days, how would you rate your fatigue on average? 2   In the past 7 days, how would you rate your pain on average, where 0 means no pain, and 10 means worst imaginable pain? 4   Global Mental Health Score 11   Global Physical Health Score 15   PROMIS TOTAL - SUBSCORES 26     PROMIS 10-Global Health (only subscores and total score):       2/26/2024     5:50 PM   PROMIS-10 Scores Only   Global Mental Health Score 11   Global Physical Health Score 15   PROMIS TOTAL - SUBSCORES 26     [place] Suicide Severity Rating Scale  (Lifetime/Recent)       No data to display              [place] Suicide Severity Rating Scale (Short Version)      5/14/2022     1:15 PM   [place] Suicide Severity Rating (Short Version)   Over the past 2 weeks have you felt down, depressed, or hopeless? no   Over the past 2 weeks have you had thoughts of killing yourself? no   Have you ever attempted to kill yourself? no       Personal and Family Medical History:  Patient does not report a family history of mental health concerns.  Patient reports family history includes Breast Cancer in her maternal grandmother; Cancer in her mother; Cancer (age of onset: 56.00) in her father; Colon Cancer (age of onset: 57.00) in her father; Depression in her mother; Diabetes Type 2  in her mother; Hypertension in her mother; Lung Cancer (age of onset: 84.00) in her mother; Polymyalgia rheumatica in her mother; Transient ischemic attack in her mother..     Patient does not report Mental Health Diagnosis or Treatment.      Patient has had a physical exam to rule out medical causes for current symptoms. Date of last physical exam was within the past year. Client was encouraged to follow up with PCP if symptoms were to develop. The patient has a [facility] Primary Care Provider, who is named Rissa Corral.  Patient reports no current medical concerns.  Patient denies any issues with pain. There are not significant appetite / nutritional concerns / weight changes.   Patient does not report a history of head injury / trauma / cognitive impairment.      Patient reports current meds as:   No outpatient medications have been marked as taking for the 2/27/24 encounter (Virtual Visit) with Fabiana Escamilla LICSW.       Medication Adherence:  Patient reports   taking prescribed medications as prescribed.    Patient Allergies:    Allergies   Allergen Reactions    Amoxicillin-Pot Clavulanate     Suture      vicral       Medical History:    Past Medical History:   Diagnosis Date     Appendicitis 11/17/2016    Depression     Diverticulosis     Fibrocystic breast     HLD (hyperlipidemia)     Inverted nipple     PONV (postoperative nausea and vomiting)          Current Mental Status Exam:   Appearance:  Appropriate    Eye Contact:  Good   Psychomotor:  Normal       Gait / station:  no problem  Attitude / Demeanor: Cooperative  Interested Pleasant  Speech      Rate / Production: Normal/ Responsive      Volume:  Normal  volume      Language:  intact, no problems, and good  Mood:   Normal  Affect:   Appropriate    Thought Content: Clear   Thought Process: Coherent  Logical       Associations: No loosening of associations  Insight:   Good   Judgment:  Intact   Orientation:  All  Attention/concentration: Good    Substance Use:   Patient did not report a family history of substance use concerns; see medical history section for details.  Patient has not received chemical dependency treatment in the past.  Patient has not ever been to detox.      Patient is not currently receiving any chemical dependency treatment.           Substance History of use Age of first use Date of last use     Pattern and duration of use (include amounts and frequency)   Alcohol never used     REPORTS SUBSTANCE USE: N/A   Cannabis   never used   REPORTS SUBSTANCE USE: N/A     Amphetamines   never used   REPORTS SUBSTANCE USE: N/A   Cocaine/crack    never used   REPORTS SUBSTANCE USE: N/A   Hallucinogens never used     REPORTS SUBSTANCE USE: N/A   Inhalants never used     REPORTS SUBSTANCE USE: N/A   Heroin never used     REPORTS SUBSTANCE USE: N/A   Other Opiates used in the past 44 01/01/15 REPORTS SUBSTANCE USE: N/A   Benzodiazepine   never used   REPORTS SUBSTANCE USE: N/A   Barbiturates never used   REPORTS SUBSTANCE USE: N/A   Over the counter meds used in the past 19 02/03/20 REPORTS SUBSTANCE USE: N/A   Caffeine currently use 20  REPORTS SUBSTANCE USE: N/A   Nicotine  never used   REPORTS SUBSTANCE USE: N/A   Other  substances not listed above:  Identify:  never used   REPORTS SUBSTANCE USE: N/A     Patient reported the following problems as a result of their substance use: no problems, not applicable.    Substance Use: No symptoms    Based on the negative CAGE score and clinical interview there  are not indications of drug or alcohol abuse.    Significant Losses / Trauma / Abuse / Neglect Issues:   Patient did not serve in the .  There are not indications or report of significant loss, trauma, abuse or neglect issues.  Concerns for possible neglect are not present.     Safety Assessment:   Patient denies current homicidal ideation and behaviors.  Patient denies current self-injurious ideation and behaviors.    Patient denied risk behaviors associated with substance use.   Patient denies any high risk behaviors associated with mental health symptoms.  Patient reports the following current concerns for their personal safety: None.  Patient reports there are not firearms in the house.        History of Safety Concerns:  Patient denied a history of homicidal ideation.     Patient denied a history of personal safety concerns.    Patient denied a history of assaultive behaviors.    Patient denied a history of sexual assault behaviors.     Patient denied a history of risk behaviors associated with substance use.  Patient denies any history of high risk behaviors associated with mental health symptoms.  Patient reports the following protective factors: forward or future oriented thinking; dedication to family or friends; regular sleep; regular physical activity; help seeking behaviors when distressed; adherence with prescribed medication; living with other people; daily obligations; commitment to well being; positive social skills; sense of personal control or determination    Risk Plan:  See Recommendations for Safety and Risk Management Plan    Review of Symptoms per patient report:   Depression: Change in sleep, Lack of  interest, Excessive or inappropriate guilt, Change in energy level, Change in appetite, Feelings of hopelessness, Feelings of helplessness, Ruminations, Irritability, and Feeling sad, down, or depressed  Roberta:  No Symptoms  Psychosis: No Symptoms  Anxiety: Excessive worry, Nervousness, Physical complaints, such as headaches, stomachaches, muscle tension, Sleep disturbance, Ruminations, Poor concentration, and Irritability  Panic:  No symptoms  Post Traumatic Stress Disorder:  No Symptoms   Eating Disorder: No Symptoms  ADD / ADHD:  No symptoms  Conduct Disorder: No symptoms  Autism Spectrum Disorder: No symptoms  Obsessive Compulsive Disorder: No Symptoms    Patient reports the following compulsive behaviors and treatment history:  none reported .      Diagnostic Criteria:   Adjustment Disorder  A. The development of emotional or behavioral symptoms in response to an identifiable stressor(s) occurring within 3 months of the onset of the stressor(s)  B. These symptoms or behaviors are clinically significant, as evidenced by one or both of the following:       - Marked distress that is out of proportion to the severity/intensity of the stressor (with consideration for external context & culture)       - Significant impairment in social, occupational, or other important areas of functioning  C. The stress-related disturbance does not meet criteria for another disorder & is not not an exacerbation of another mental disorder  D. The symptoms do not represent normal bereavement  E. Once the stressor or its consequences have terminated, the symptoms do not persist for more than an additional 6 months       * Adjustment Disorder with Mixed Anxiety and Depressed Mood: The predominant manifestation is a combination of depression and anxiety    Functional Status:  Patient reports the following functional impairments:  management of the household and or completion of tasks, relationship(s), self-care, and social interactions.      Nonprogrammatic care:  Patient is requesting basic services to address current mental health concerns.    Clinical Summary:  1. Psychosocial, Cultural and Contextual Factors: Patient reports strain in her relationships with with son and daughter in law, and not being able to see grandchildren recently, which has impacted her mental health.  2. Principal DSM5 Diagnoses  (Sustained by DSM5 Criteria Listed Above):   Adjustment Disorders  309.28 (F43.23) With mixed anxiety and depressed mood.  3. Other Diagnoses that is relevant to services:   N/A  4. Provisional Diagnosis:  N/A  5. Prognosis: Return to Baseline Functioning, Expect Improvement, Relieve Acute Symptoms, and Maintain Current Status / Prevent Deterioration.  6. Likely consequences of symptoms if not treated: If the reported symptoms and concerns are not treated, it would be anticipated that they would potentially increase in frequency and severity, further impacting patient's mental health, functioning, and general sense of well-being. .  7. Client strengths include:  empathetic, goal-focused, insightful, motivated, open to learning, open to suggestions / feedback, and support of family, friends and providers .     Recommendations:     1. Plan for Safety and Risk Management:   Safety and Risk: Recommended that patient call 911 or go to the local ED should there be a change in any of these risk factors..          Report to child / adult protection services was NA.     2. Patient's identified mental health concerns with a cultural influence will be addressed by having a conversation with patient at the start of services in regard to how to best manage any potential concerns that might come up in therapy in a manner that feels both respectful and culturally appropriate .     3. Initial Treatment will focus on:    Depressed Mood - decreasing frequency and severity of symptoms   Anxiety - decreasing frequency and severity of symptoms .     4.  Resources/Service Plan:    services are not indicated.   Modifications to assist communication are not indicated.   Additional disability accommodations are not indicated.      5. Collaboration:   Collaboration / coordination of treatment will be initiated with the following  support professionals:  none indicated at this time .      6.  Referrals:   The following referral(s) will be initiated:  none indicated at this time .       A Release of Information has been obtained for the following: N/A     Clinical Substantiation/medical necessity for the above recommendations: Outpatient therapy services are being recommended at this time, are are deemed medically necessary to assist patient in addressing the reported symptoms and concerns, as well as improving mental health, functioning, and general sense of well-being.     7. VITO:    VITO:  Discussed the general effects of drugs and alcohol on health and well-being. Provider gave patient printed information about the effects of chemical use on their health and well being. Recommendations:  none, based upon the information reported at the time of this assessment.     8. Records:   These were reviewed at time of assessment.   Information in this assessment was obtained from the medical record and  provided by patient who is a good historian.  Patient will have open access to their mental health medical record.    9.   Interactive Complexity: No    10. Safety Plan:  Patient denied any current/recent/lifetime history of suicidal ideation and/or behaviors.  No safety plan indicated at this time.     Provider Name/ Credentials:  VIVIENNE Mckeon, [facility] February 27, 2024

## 2024-03-18 ENCOUNTER — OFFICE VISIT (OUTPATIENT)
Dept: PSYCHOLOGY | Facility: CLINIC | Age: 74
End: 2024-03-18
Payer: COMMERCIAL

## 2024-03-18 DIAGNOSIS — F43.23 ADJUSTMENT DISORDER WITH MIXED ANXIETY AND DEPRESSED MOOD: Primary | ICD-10-CM

## 2024-03-18 PROCEDURE — 90837 PSYTX W PT 60 MINUTES: CPT | Performed by: SOCIAL WORKER

## 2024-03-18 NOTE — PROGRESS NOTES
[facility]                                     Progress Note    Patient Name: Tayler Mcmahon  Date: 3/18/2024         Service Type: Individual      Session Start Time: 11:02am  Session End Time: 11:55am     Session Length: 53 mintues    Session #: 2    Attendees: Client    Service Modality:  In-person    DATA  Interactive Complexity: No  Crisis: No        Progress Since Last Session (Related to Symptoms / Goals / Homework):   Symptoms: No change Patient does not report any significant changes to the reported symptoms and concerns since the time of the previous diagnostic assessment appointment.    Homework: Achieved / completed to satisfaction      Episode of Care Goals: Satisfactory progress - ACTION (Actively working towards change); Intervened by reinforcing change plan / affirming steps taken     Current / Ongoing Stressors and Concerns:   Patient reports strain in her relationships with with son and daughter in law, and not being able to see grandchildren recently, which has impacted her mental health. Patient reports no significant changes to the reported symptoms and concerns since the time of her previous appointment. She reports some stress in relation to whether or not she should reach out to her son again, and how to best approach this, as he has not responded to communication she had sent him a couple of weeks ago. Patient notes struggling with not understanding why their relationship changed in the past several years, worrying for her son, and missing the relationship that she had with him and her grandchildren. Patient notes that he has been behaving in the same manner toward his sister/patient's daughter, and patient having some concerns over whether her son is involved in an unhealthy relationship with how he has cut off his family without explanation.      Treatment Objective(s) Addressed in This Session:   N/A--treatment plan goals and objectives were developed during today's  appointment.      Intervention:   Motivational Interviewing    MI Intervention: Expressed Empathy/Understanding, Supported Autonomy, Collaboration, Evocation, Permission to raise concern or advise, Open-ended questions, Change talk (evoked), and Reframe     Change Talk Expressed by the Patient: Desire to change Ability to change Reasons to change Need to change Committment to change Activation Taking steps    Provider Response to Change Talk: E - Evoked more info from patient about behavior change, A - Affirmed patient's thoughts, decisions, or attempts at behavior change, R - Reflected patient's change talk, and S - Summarized patient's change talk statements    Psychodynamic: This writer used open ended questions and reflective listening to assist patient in processing her thoughts and emotions as related to the current presenting symptoms and concerns.     Assessments completed prior to visit:  The following assessments were completed by patient for this visit:  PHQ9:       10/19/2020    11:00 AM 10/20/2021    11:57 AM 10/26/2022     8:15 AM 11/7/2023    11:07 AM 2/26/2024     4:54 PM   PHQ-9 SCORE   PHQ-9 Total Score MyChart   7 (Mild depression) 5 (Mild depression) 4 (Minimal depression)   PHQ-9 Total Score 2 0 7 5 4     GAD7:       10/26/2022     8:16 AM 2/27/2024    11:33 AM   DC-7 SCORE   Total Score 4 (minimal anxiety)    Total Score 4 5         ASSESSMENT: Current Emotional / Mental Status (status of significant symptoms):   Risk status (Self / Other harm or suicidal ideation)   Patient denies current fears or concerns for personal safety.   Patient denies current or recent suicidal ideation or behaviors.   Patient denies current or recent homicidal ideation or behaviors.   Patient denies current or recent self injurious behavior or ideation.   Patient denies other safety concerns.   Patient reports there has been no change in risk factors since their last session.     Patient reports there has been no  change in protective factors since their last session.     Recommended that patient call 911 or go to the local ED should there be a change in any of these risk factors.     Appearance:   Appropriate    Eye Contact:   Good    Psychomotor Behavior: Normal    Attitude:   Cooperative  Interested Pleasant   Orientation:   All   Speech    Rate / Production: Normal     Volume:  Normal    Mood:    Anxious  Normal   Affect:    Appropriate    Thought Content:  Clear    Thought Form:  Coherent  Logical    Insight:    Good      Medication Review:   No changes to current psychiatric medication(s)     Medication Compliance:   Yes     Changes in Health Issues:   None reported     Chemical Use Review:   Substance Use: Chemical use reviewed, no active concerns identified      Tobacco Use: No current tobacco use.      Diagnosis:  F43.23 Adjustment Disorder with mixed anxiety and depressed mood    Collateral Reports Completed:   Not Applicable    PLAN: (Patient Tasks / Therapist Tasks / Other)  Between now and her next appointment, patient will practice using the communication and mindfulness based coping skills discussed during today's appointment to assist her in managing current reported stressors and anxiety.         Fabiana Escamilla, [facility]                                                         ______________________________________________________________________    Individual Treatment Plan    Patient's Name: Tayler Mcmahon  YOB: 1950    Date of Creation: 3/18/2024  Date Treatment Plan Last Reviewed/Revised: 3/18/2024 (initial treatment plan)    DSM5 Diagnoses: Adjustment Disorders  309.28 (F43.23) With mixed anxiety and depressed mood  Psychosocial / Contextual Factors: Patient reports strain in her relationships with with son and daughter in law, and not being able to see grandchildren recently, which has impacted her mental health.   PROMIS (reviewed every 90 days): completed on 2/27/2024, with a score of 26      Referral / Collaboration:  Referral to another professional/service is not indicated at this time..    Anticipated number of session for this episode of care: 3-6 sessions  Anticipation frequency of session: Weekly  Anticipated Duration of each session: 38-52 minutes  Treatment plan will be reviewed in 90 days or when goals have been changed.       MeasurableTreatment Goal(s) related to diagnosis / functional impairment(s)  Goal 1: Patient will further develop at least 3-4 new coping skills for management of depression and anxiety symptoms, and will practice using these skills at least 1-2 times daily, as reported by her, over 3 months' time.     Objective #A (Patient Action)    Patient will identify at least 3-4 worries and/or thought patterns that contribute to feeling anxious or depressed.  Status: New - Date: 3/18/2024      Intervention(s)  Therapist will ask patient to track her symptoms, to identify any potential patterns with emotions, thoughts, situations/circumstances, and/or triggers.     Objective #B  Patient will further develop at least 3-4 new coping skills for anxiety and depression symptom management, and will practice using these skills at least 1-2 times weekly.  Status: New - Date: 3/18/2024      Intervention(s)  Therapist will teach and practice mindfulness based and cognitive coping skills in session with patient, and will ask patient to practice these skills outside of of appointments as well.       Goal 2: Patient will continue to set and maintain healthy boundaries with others, at least 1-2 times weekly.     Objective #A (Patient Action)    Status: New - Date: 3/18/2024      Patient will compile a list of at least 3-5 boundaries or limits that she would like to set with others.     Intervention(s)  Therapist will assist patient in identifying healthy vs unhealthy relationship dynamics, as well as identify potential areas of change she would like to see in her relationships with others.      Objective #B  Patient will practice setting  healthy boundaries and limits with others, at least 1-2 times monthly.    Status: New - Date: 3/18/2024      Intervention(s)  Therapist will teach and practice healthy boundary setting skills in session with patient, and will ask her to practice these skills outside of appointments as well.         Patient has reviewed and agreed to the above plan.      Fabiana Escamilla, [facility]  March 18, 2024

## 2024-03-31 ASSESSMENT — PATIENT HEALTH QUESTIONNAIRE - PHQ9
SUM OF ALL RESPONSES TO PHQ QUESTIONS 1-9: 5
SUM OF ALL RESPONSES TO PHQ QUESTIONS 1-9: 5
10. IF YOU CHECKED OFF ANY PROBLEMS, HOW DIFFICULT HAVE THESE PROBLEMS MADE IT FOR YOU TO DO YOUR WORK, TAKE CARE OF THINGS AT HOME, OR GET ALONG WITH OTHER PEOPLE: SOMEWHAT DIFFICULT

## 2024-04-01 ENCOUNTER — OFFICE VISIT (OUTPATIENT)
Dept: PSYCHOLOGY | Facility: CLINIC | Age: 74
End: 2024-04-01
Payer: COMMERCIAL

## 2024-04-01 DIAGNOSIS — F43.23 ADJUSTMENT DISORDER WITH MIXED ANXIETY AND DEPRESSED MOOD: Primary | ICD-10-CM

## 2024-04-01 PROCEDURE — 90837 PSYTX W PT 60 MINUTES: CPT | Performed by: SOCIAL WORKER

## 2024-04-01 NOTE — PROGRESS NOTES
[facility]                                     Progress Note    Patient Name: Tayler Mcmahon  Date: 4/1/2024         Service Type: Individual      Session Start Time: 11:02am  Session End Time: 11:57am     Session Length: 55 minutes    Session #: 3    Attendees: Client    Service Modality:  In-person    DATA  Interactive Complexity: No  Crisis: No        Progress Since Last Session (Related to Symptoms / Goals / Homework):   Symptoms: No change Patient does not report any significant changes to the reported symptoms and concerns since the time of her previous appointment.    Homework: Achieved / completed to satisfaction      Episode of Care Goals: Satisfactory progress - ACTION (Actively working towards change); Intervened by reinforcing change plan / affirming steps taken     Current / Ongoing Stressors and Concerns:    Patient reports strain in her relationships with with son and daughter in law, and not being able to see grandchildren recently, which has impacted her mental health. Patient reports no significant changes to the reported symptoms and concerns since the time of her previous appointment. She reports some continued stress in relation to whether or not she should reach out to her son again, and how to best approach this, as he has not responded to communication she had sent him a few weeks ago. Patient notes struggling with not understanding why their relationship changed in the past several years, worrying for her son, and missing the relationship that she had with him and her grandchildren, as well as noting that she would have liked to have a relationship with her daughter in law as well, and not understanding why her son and daughter in law have significantly limited their contact with her over the years. Patient notes that her son has been behaving in the same manner toward his sister/patient's daughter, and patient continues to note some concerns over whether her son is  involved in an unhealthy relationship, with how he has cut off his family without explanation.       Treatment Objective(s) Addressed in This Session:   1) Patient will identify at least 3-4 worries and/or thought patterns that contribute to feeling anxious or depressed.  2) Patient will further develop at least 3-4 new coping skills for anxiety and depression symptom management, and will practice using these skills at least 1-2 times weekly.  3) Patient will compile a list of at least 3-5 boundaries or limits that she would like to set with others.   4) Patient will practice setting  healthy boundaries and limits with others, at least 1-2 times monthly.         Intervention:    Motivational Interviewing     MI Intervention: Expressed Empathy/Understanding, Supported Autonomy, Collaboration, Evocation, Permission to raise concern or advise, Open-ended questions, Change talk (evoked), and Reframe      Change Talk Expressed by the Patient: Desire to change Ability to change Reasons to change Need to change Committment to change Activation Taking steps     Provider Response to Change Talk: E - Evoked more info from patient about behavior change, A - Affirmed patient's thoughts, decisions, or attempts at behavior change, R - Reflected patient's change talk, and S - Summarized patient's change talk statements     Psychodynamic: This writer used open ended questions and reflective listening to assist patient in processing her thoughts and emotions as related to the current presenting symptoms and concerns.        Assessments completed prior to visit:  The following assessments were completed by patient for this visit:  PHQ9:       10/19/2020    11:00 AM 10/20/2021    11:57 AM 10/26/2022     8:15 AM 11/7/2023    11:07 AM 2/26/2024     4:54 PM 3/31/2024    12:08 PM   PHQ-9 SCORE   PHQ-9 Total Score MyChart   7 (Mild depression) 5 (Mild depression) 4 (Minimal depression) 5 (Mild depression)   PHQ-9 Total Score 2 0 7 5 4 5      GAD7:       10/26/2022     8:16 AM 2/27/2024    11:33 AM   DC-7 SCORE   Total Score 4 (minimal anxiety)    Total Score 4 5         ASSESSMENT: Current Emotional / Mental Status (status of significant symptoms):   Risk status (Self / Other harm or suicidal ideation)   Patient denies current fears or concerns for personal safety.   Patient denies current or recent suicidal ideation or behaviors.   Patient denies current or recent homicidal ideation or behaviors.   Patient denies current or recent self injurious behavior or ideation.   Patient denies other safety concerns.   Patient reports there has been no change in risk factors since their last session.     Patient reports there has been no change in protective factors since their last session.     Recommended that patient call 911 or go to the local ED should there be a change in any of these risk factors.     Appearance:   Appropriate    Eye Contact:   Good    Psychomotor Behavior: Normal    Attitude:   Cooperative  Interested Pleasant   Orientation:   All   Speech    Rate / Production: Normal     Volume:  Normal    Mood:    Anxious  Normal   Affect:    Appropriate    Thought Content:  Clear    Thought Form:  Coherent  Logical    Insight:    Good      Medication Review:   No changes to current psychiatric medication(s)     Medication Compliance:   Yes     Changes in Health Issues:   None reported     Chemical Use Review:   Substance Use: Chemical use reviewed, no active concerns identified      Tobacco Use: No current tobacco use.      Diagnosis:  F43.23 Adjustment Disorder with mixed anxiety and depressed mood       Collateral Reports Completed:   Not Applicable    PLAN: (Patient Tasks / Therapist Tasks / Other)  Between now and her next appointment, patient will continue to practice using the communication and mindfulness based coping skills discussed during today's appointment to assist her in managing current reported stressors and anxiety.           Fabiana Escamilla, LICSW                                                         ______________________________________________________________________    Individual Treatment Plan     Patient's Name: Tayler Mcmahon                       YOB: 1950     Date of Creation: 3/18/2024  Date Treatment Plan Last Reviewed/Revised: 3/18/2024 (initial treatment plan)     DSM5 Diagnoses: Adjustment Disorders  309.28 (F43.23) With mixed anxiety and depressed mood  Psychosocial / Contextual Factors: Patient reports strain in her relationships with with son and daughter in law, and not being able to see grandchildren recently, which has impacted her mental health.   PROMIS (reviewed every 90 days): completed on 2/27/2024, with a score of 26      Referral / Collaboration:  Referral to another professional/service is not indicated at this time..     Anticipated number of session for this episode of care: 3-6 sessions  Anticipation frequency of session: Weekly  Anticipated Duration of each session: 38-52 minutes  Treatment plan will be reviewed in 90 days or when goals have been changed.         MeasurableTreatment Goal(s) related to diagnosis / functional impairment(s)  Goal 1: Patient will further develop at least 3-4 new coping skills for management of depression and anxiety symptoms, and will practice using these skills at least 1-2 times daily, as reported by her, over 3 months' time.      Objective #A (Patient Action)                          Patient will identify at least 3-4 worries and/or thought patterns that contribute to feeling anxious or depressed.  Status: New - Date: 3/18/2024       Intervention(s)  Therapist will ask patient to track her symptoms, to identify any potential patterns with emotions, thoughts, situations/circumstances, and/or triggers.      Objective #B  Patient will further develop at least 3-4 new coping skills for anxiety and depression symptom management, and will practice  using these skills at least 1-2 times weekly.  Status: New - Date: 3/18/2024       Intervention(s)  Therapist will teach and practice mindfulness based and cognitive coping skills in session with patient, and will ask patient to practice these skills outside of of appointments as well.         Goal 2: Patient will continue to set and maintain healthy boundaries with others, at least 1-2 times weekly.      Objective #A (Patient Action)                          Status: New - Date: 3/18/2024       Patient will compile a list of at least 3-5 boundaries or limits that she would like to set with others.      Intervention(s)  Therapist will assist patient in identifying healthy vs unhealthy relationship dynamics, as well as identify potential areas of change she would like to see in her relationships with others.      Objective #B  Patient will practice setting  healthy boundaries and limits with others, at least 1-2 times monthly.                          Status: New - Date: 3/18/2024       Intervention(s)  Therapist will teach and practice healthy boundary setting skills in session with patient, and will ask her to practice these skills outside of appointments as well.            Patient has reviewed and agreed to the above plan.        Fabiana Escamilla, [facility]                 March 18, 2024

## 2024-04-02 DIAGNOSIS — G47.00 INSOMNIA, UNSPECIFIED TYPE: ICD-10-CM

## 2024-04-02 RX ORDER — TRAZODONE HYDROCHLORIDE 50 MG/1
TABLET, FILM COATED ORAL
Qty: 180 TABLET | Refills: 0 | Status: SHIPPED | OUTPATIENT
Start: 2024-04-02 | End: 2024-06-03

## 2024-06-03 DIAGNOSIS — G47.00 INSOMNIA, UNSPECIFIED TYPE: ICD-10-CM

## 2024-06-03 RX ORDER — TRAZODONE HYDROCHLORIDE 50 MG/1
TABLET, FILM COATED ORAL
Qty: 180 TABLET | Refills: 0 | Status: SHIPPED | OUTPATIENT
Start: 2024-06-03

## 2024-06-17 ENCOUNTER — NURSE TRIAGE (OUTPATIENT)
Dept: FAMILY MEDICINE | Facility: CLINIC | Age: 74
End: 2024-06-17

## 2024-06-17 ENCOUNTER — ANCILLARY PROCEDURE (OUTPATIENT)
Dept: GENERAL RADIOLOGY | Facility: CLINIC | Age: 74
End: 2024-06-17
Attending: NURSE PRACTITIONER
Payer: COMMERCIAL

## 2024-06-17 ENCOUNTER — OFFICE VISIT (OUTPATIENT)
Dept: PEDIATRICS | Facility: CLINIC | Age: 74
End: 2024-06-17
Payer: COMMERCIAL

## 2024-06-17 VITALS
OXYGEN SATURATION: 96 % | WEIGHT: 160.8 LBS | BODY MASS INDEX: 30.36 KG/M2 | RESPIRATION RATE: 16 BRPM | SYSTOLIC BLOOD PRESSURE: 130 MMHG | HEIGHT: 61 IN | HEART RATE: 63 BPM | TEMPERATURE: 98.3 F | DIASTOLIC BLOOD PRESSURE: 67 MMHG

## 2024-06-17 DIAGNOSIS — R00.2 PALPITATIONS: Primary | ICD-10-CM

## 2024-06-17 DIAGNOSIS — R07.89 CHEST PRESSURE: ICD-10-CM

## 2024-06-17 LAB
ANION GAP SERPL CALCULATED.3IONS-SCNC: 13 MMOL/L (ref 7–15)
BUN SERPL-MCNC: 17.9 MG/DL (ref 8–23)
CALCIUM SERPL-MCNC: 9.2 MG/DL (ref 8.8–10.2)
CHLORIDE SERPL-SCNC: 104 MMOL/L (ref 98–107)
CREAT SERPL-MCNC: 0.82 MG/DL (ref 0.51–0.95)
DEPRECATED HCO3 PLAS-SCNC: 24 MMOL/L (ref 22–29)
EGFRCR SERPLBLD CKD-EPI 2021: 74 ML/MIN/1.73M2
ERYTHROCYTE [DISTWIDTH] IN BLOOD BY AUTOMATED COUNT: 13 % (ref 10–15)
GLUCOSE SERPL-MCNC: 109 MG/DL (ref 70–99)
HCT VFR BLD AUTO: 42.8 % (ref 35–47)
HGB BLD-MCNC: 14.5 G/DL (ref 11.7–15.7)
MAGNESIUM SERPL-MCNC: 1.8 MG/DL (ref 1.7–2.3)
MCH RBC QN AUTO: 31.8 PG (ref 26.5–33)
MCHC RBC AUTO-ENTMCNC: 33.9 G/DL (ref 31.5–36.5)
MCV RBC AUTO: 94 FL (ref 78–100)
PLATELET # BLD AUTO: 211 10E3/UL (ref 150–450)
POTASSIUM SERPL-SCNC: 4.3 MMOL/L (ref 3.4–5.3)
RBC # BLD AUTO: 4.56 10E6/UL (ref 3.8–5.2)
SODIUM SERPL-SCNC: 141 MMOL/L (ref 135–145)
TROPONIN T SERPL HS-MCNC: 15 NG/L
TROPONIN T SERPL HS-MCNC: 15 NG/L
TSH SERPL DL<=0.005 MIU/L-ACNC: 0.56 UIU/ML (ref 0.3–4.2)
WBC # BLD AUTO: 5.8 10E3/UL (ref 4–11)

## 2024-06-17 PROCEDURE — 80048 BASIC METABOLIC PNL TOTAL CA: CPT | Performed by: NURSE PRACTITIONER

## 2024-06-17 PROCEDURE — 71046 X-RAY EXAM CHEST 2 VIEWS: CPT | Mod: TC | Performed by: RADIOLOGY

## 2024-06-17 PROCEDURE — 83735 ASSAY OF MAGNESIUM: CPT | Performed by: NURSE PRACTITIONER

## 2024-06-17 PROCEDURE — 36415 COLL VENOUS BLD VENIPUNCTURE: CPT | Performed by: NURSE PRACTITIONER

## 2024-06-17 PROCEDURE — 85027 COMPLETE CBC AUTOMATED: CPT | Performed by: NURSE PRACTITIONER

## 2024-06-17 PROCEDURE — 84443 ASSAY THYROID STIM HORMONE: CPT | Performed by: NURSE PRACTITIONER

## 2024-06-17 PROCEDURE — 99215 OFFICE O/P EST HI 40 MIN: CPT | Performed by: NURSE PRACTITIONER

## 2024-06-17 PROCEDURE — 84484 ASSAY OF TROPONIN QUANT: CPT | Mod: 59 | Performed by: NURSE PRACTITIONER

## 2024-06-17 PROCEDURE — 93005 ELECTROCARDIOGRAM TRACING: CPT | Performed by: NURSE PRACTITIONER

## 2024-06-17 ASSESSMENT — PAIN SCALES - GENERAL: PAINLEVEL: NO PAIN (0)

## 2024-06-17 NOTE — TELEPHONE ENCOUNTER
Referral to Acute and Diagnostic Services    [phone] ([place]) [address], [address], [place]  [address]    Transition to Acute & Diagnostic Services Clinic has been discussed with patient, and she agrees with next level of care.   Patient understands that evaluation/treatment at [facility] typically takes significantly longer than in clinic/urgent care (>2 hours).  [facility] has been contacted by provider/staff to confirm patient acceptance.         Special issues:      None                        The following provider has assessed this patient for intervention at [facility], and directed the patient for referral: Lottie Zimmerman RN

## 2024-06-17 NOTE — PROGRESS NOTES
"Acute and Diagnostic Services Clinic Visit    Assessment & Plan     Palpitations  Chest pressure  Episode of palpitations and chest pressure at 0530 this morning after awakening to find her power out, CPAP not working. Not associated with shortness of breath, dyspnea on exertion, vomiting, diaphoresis, radiation of pain. Fell back to sleep, pain had resolved when she woke, and has not recurred. EKG in [facility] today with NSR, no ectopy or evidence of ischemic changes. TSH, CBC, BMP unremarkable. Initial troponin 15, 2 hour repeat also 15. CXR without evidence of infiltrate or effusion. Low suspicion for AAA, pain not ripping/tearing in quality, no widened mediastinum on CXR. She does have risk factors for cardiac disease to include FILEMON, obesity, may need to consider stress imaging, will defer to PCP. Zio Patch ordered for further evaluation of palpitations. TSH and mag normal. Advised if any return of symptoms, should present to ER immediately, otherwise PCP follow up within the next few days for further discussion of symptoms.   - EKG 12-lead, tracing only  - TSH with free T4 reflex; Future  - ZIO PATCH MAIL OUT; Future  - CBC with platelets; Future  - Basic metabolic panel; Future  - Troponin T, High Sensitivity; Future  - XR Chest 2 Views; Future  - Troponin T, High Sensitivity    40 minutes were spent doing chart review, history and exam, documentation and further activities per the note.      BMI  Estimated body mass index is 30.38 kg/m  as calculated from the following:    Height as of this encounter: 1.549 m (5' 1\").    Weight as of this encounter: 72.9 kg (160 lb 12.8 oz).         See Patient Instructions    Return in about 1 week (around 6/24/2024) for Recheck of symptoms.    Elin Lester is a 74 year old, presenting for the following health issues:  Palpitations (Had heart palpitations at 5:30 this morning.  She uses C-PAP.  Her power went out at this time, that shut off the C-PAP when power went out. Sx " began then)    HPI     Chest Pain  Onset/Duration: This morning at 5:30 - Heart palpitations, chest heaviness. Her power went out along with her C-PAP.  Fell asleep.  SX resolved by the time she woke up  Description:   Location: left side  Character: achey  Radiation: no  Duration: 30 minutes   Intensity: mild  Progression of Symptoms: improving  Accompanying Signs & Symptoms:  Shortness of breath: No  Sweating: No  Nausea/vomiting: No  Lightheadedness: No  Palpitations: YES  Fever/Chills: No  Cough: No           Heartburn: No  History:   Family history of heart disease: No  Tobacco use: No  Previous similar symptoms: no   Precipitating factors:   Worse with exertion: No  Worse with deep breaths: No           Related to eating: No           Better with burping: No  Alleviating factors: she went back to sleep  Therapies tried and outcome: nothing    Above HPI reviewed. Additionally, around 530 this morning, patient awoke to note that her power had gone out and her CPAP was not functioning.  She got up to move to her recliner, and at that time a noted that she was having palpitations as well as chest pressure.  She got to her recliner, fell asleep for approximately 3 hours, and awoke and was asymptomatic.  She has not had further palpitations or chest pain.  Denies arm pain, jaw pain, back pain.  No diaphoresis, no nausea.  No dizziness. Has not had exertional pain or dyspnea.  Does have a history of palpitations, had a Holter in 2015, showed some PACs.  She does occasionally have these.  Treadmill stress in 2021 was negative for inducible myocardial ischemia.      Review of Systems  Constitutional, neuro, ENT, endocrine, pulmonary, cardiac, gastrointestinal, genitourinary, musculoskeletal, integument and psychiatric systems are negative, except as otherwise noted.      Objective    /67 (BP Location: Left arm, Patient Position: Sitting, Cuff Size: Adult Regular)   Pulse 63   Temp 98.3  F (36.8  C) (Oral)    "Resp 16   Ht 1.549 m (5' 1\")   Wt 72.9 kg (160 lb 12.8 oz)   LMP  (LMP Unknown)   SpO2 96%   BMI 30.38 kg/m    Body mass index is 30.38 kg/m .  Physical Exam  Vitals and nursing note reviewed.   Constitutional:       Appearance: Normal appearance.   HENT:      Head: Normocephalic and atraumatic.      Mouth/Throat:      Mouth: Mucous membranes are moist.   Eyes:      Comments: Non-icteric   Cardiovascular:      Rate and Rhythm: Normal rate and regular rhythm.      Pulses: Normal pulses.      Heart sounds: Normal heart sounds, S1 normal and S2 normal. Heart sounds not distant. No murmur heard.     No friction rub. No gallop.   Pulmonary:      Effort: Pulmonary effort is normal.      Breath sounds: Normal breath sounds.   Abdominal:      General: Abdomen is flat. Bowel sounds are normal.      Palpations: Abdomen is soft.   Musculoskeletal:      Cervical back: Neck supple.      Right lower leg: No edema.      Left lower leg: No edema.   Skin:     General: Skin is warm and dry.      Capillary Refill: Capillary refill takes less than 2 seconds.   Neurological:      General: No focal deficit present.      Mental Status: She is alert and oriented to person, place, and time.   Psychiatric:         Mood and Affect: Mood normal.         Behavior: Behavior normal.         Thought Content: Thought content normal.         Judgment: Judgment normal.            EKG - Reviewed and interpreted by me appears normal, NSR, normal axis, normal intervals, no acute ST/T changes c/w ischemia, no LVH by voltage criteria, unchanged from previous tracings  Results for orders placed or performed in visit on 06/17/24 (from the past 24 hour(s))   CBC with platelets   Result Value Ref Range    WBC Count 5.8 4.0 - 11.0 10e3/uL    RBC Count 4.56 3.80 - 5.20 10e6/uL    Hemoglobin 14.5 11.7 - 15.7 g/dL    Hematocrit 42.8 35.0 - 47.0 %    MCV 94 78 - 100 fL    MCH 31.8 26.5 - 33.0 pg    MCHC 33.9 31.5 - 36.5 g/dL    RDW 13.0 10.0 - 15.0 %    " Platelet Count 211 150 - 450 10e3/uL   Basic metabolic panel   Result Value Ref Range    Sodium 141 135 - 145 mmol/L    Potassium 4.3 3.4 - 5.3 mmol/L    Chloride 104 98 - 107 mmol/L    Carbon Dioxide (CO2) 24 22 - 29 mmol/L    Anion Gap 13 7 - 15 mmol/L    Urea Nitrogen 17.9 8.0 - 23.0 mg/dL    Creatinine 0.82 0.51 - 0.95 mg/dL    GFR Estimate 74 >60 mL/min/1.73m2    Calcium 9.2 8.8 - 10.2 mg/dL    Glucose 109 (H) 70 - 99 mg/dL   Troponin T, High Sensitivity   Result Value Ref Range    Troponin T, High Sensitivity 15 (H) <=14 ng/L   TSH with free T4 reflex   Result Value Ref Range    TSH 0.56 0.30 - 4.20 uIU/mL   Magnesium   Result Value Ref Range    Magnesium 1.8 1.7 - 2.3 mg/dL   Troponin T, High Sensitivity   Result Value Ref Range    Troponin T, High Sensitivity 15 (H) <=14 ng/L     No results found for this visit on 06/17/24.  XR Chest 2 Views    Result Date: 6/17/2024  CHEST TWO VIEWS  6/17/2024 4:07 PM HISTORY:  Chest pressure. COMPARISON: None.     IMPRESSION: Negative chest. Lungs clear. ILANA MEEKS MD   SYSTEM ID:  [id number]         Signed Electronically by: ODALYS Newton CNP

## 2024-06-17 NOTE — TELEPHONE ENCOUNTER
"Nurse Triage SBAR    Is this a 2nd Level Triage? no    Situation: Incoming call from patient. Patient states that this morning around 5:30am her power went out and her CPAP turned off which prompted her to wake up. At that time she noticed some heart palpitations and chest pressure/aches    Background: Patient has a history of PACs and says that she has experience palpations before, but it has been some time since she has experienced them.    Assessment: Chest pressure/ache pain is described as mild pain. It was location on the left side of her chest. It did not radiate and she did not experience any jaw or shoulder pain. She has difficulty remembering how long the pain lasted because she was able to fall back asleep with the pain still occurring, but it lasted at least 10 minutes. Other symptoms include heart palpations/ \"fluttering\" or \"irregular\" feeling heart beats. Currently she is not experiencing any symptoms.     Protocol Recommended Disposition:   Call [facility]/Go to ED/[facility] Now (Or To Office with PCP Approval)    Recommendation: Referring to [facility] pending approval from [facility] provider. [facility] will plan on seeing patient if she can arrive within necessary time. They will advise her to go to ED if she cannot get to [facility] in time        Does the patient meet one of the following criteria for [facility] visit consideration? 16+ years old, with an [facility] PCP     TIP  Providers, please consider if this condition is appropriate for management at one of our Acute and Diagnostic Services sites.     If patient is a good candidate, please use dotphrase <dot>triageresponse and select Refer to [facility] to document.      Reason for Disposition   Chest pain lasting longer than 5 minutes and occurred in last 3 days (72 hours) (Exception: Feels exactly the same as previously diagnosed heartburn and has accompanying sour taste in mouth.)    Additional Information   Negative: SEVERE chest pain   Negative: Pain also in shoulder(s) or arm(s) or jaw   " "Negative: Difficulty breathing   Negative: Cocaine use within last 3 days   Negative: Major surgery in the past month   Negative: Hip or leg fracture (broken bone) in past month (or had cast on leg or ankle in past month)   Negative: Illness requiring prolonged bedrest in past month (e.g., immobilization, long hospital stay)   Negative: Long-distance travel in past month (e.g., car, bus, train, plane; with trip lasting 6 or more hours)   Negative: History of prior 'blood clot' in leg or lungs (i.e., deep vein thrombosis, pulmonary embolism)   Negative: History of inherited increased risk of blood clots (e.g., Factor 5 Leiden, Anti-thrombin 3, Protein C or Protein S deficiency, Prothrombin mutation)   Negative: Cancer treatment in the past two months (or has cancer now)   Negative: Heart beating irregularly or very rapidly   Negative: Followed an injury to chest   Negative: SEVERE difficulty breathing (e.g., struggling for each breath, speaks in single words)   Negative: Difficult to awaken or acting confused (e.g., disoriented, slurred speech)   Negative: Shock suspected (e.g., cold/pale/clammy skin, too weak to stand, low BP, rapid pulse)   Negative: Passed out (i.e., lost consciousness, collapsed and was not responding)   Negative: Chest pain lasting longer than 5 minutes and ANY of the following:         Pain is crushing, pressure-like, or heavy         Over 44 years old          Over 30 years old and one cardiac risk factor (e.g diabetes, high blood pressure, high cholesterol, smoker, or family history of heart disease)         History of heart disease (e.g. angina, heart attack, heart failure, bypass surgery, takes nitroglycerin)   Negative: Heart beating < 50 beats per minute OR > 140 beats per minute   Negative: Visible sweat on face or sweat dripping down face   Negative: Sounds like a life-threatening emergency to the triager    Answer Assessment - Initial Assessment Questions  1. LOCATION: \"Where does it " "hurt?\"        Left chest  2. RADIATION: \"Does the pain go anywhere else?\" (e.g., into neck, jaw, arms, back)      no  3. ONSET: \"When did the chest pain begin?\" (Minutes, hours or days)       5:30am this morning  4. PATTERN: \"Does the pain come and go, or has it been constant since it started?\"  \"Does it get worse with exertion?\"       Not experiencing pain currently  5. DURATION: \"How long does it last\" (e.g., seconds, minutes, hours)      At least 10 minutes, but patient is unsure  6. SEVERITY: \"How bad is the pain?\"  (e.g., Scale 1-10; mild, moderate, or severe)     - MILD (1-3): doesn't interfere with normal activities      - MODERATE (4-7): interferes with normal activities or awakens from sleep     - SEVERE (8-10): excruciating pain, unable to do any normal activities        mild  7. CARDIAC RISK FACTORS: \"Do you have any history of heart problems or risk factors for heart disease?\" (e.g., angina, prior heart attack; diabetes, high blood pressure, high cholesterol, smoker, or strong family history of heart disease)      History of PACs  8. PULMONARY RISK FACTORS: \"Do you have any history of lung disease?\"  (e.g., blood clots in lung, asthma, emphysema, birth control pills)      no  9. CAUSE: \"What do you think is causing the chest pain?\"      unsure  10. OTHER SYMPTOMS: \"Do you have any other symptoms?\" (e.g., dizziness, nausea, vomiting, sweating, fever, difficulty breathing, cough)        no    Protocols used: Chest Pain-A-[place]    "

## 2024-06-17 NOTE — PATIENT INSTRUCTIONS
A Zio Patch monitor will be mailed to you. You can expect results usually about 2 weeks after mailing that back.  Labs look ok today, but may need follow up testing such as a repeat stress test.  If you have further pain, jaw/shoulder/neck/arm pain, vomiting, sweating, go to ER immediately.  Follow up with Dr. Corral next week.

## 2024-06-17 NOTE — TELEPHONE ENCOUNTER
Patient going to [place] [facility] as it is closer to her home, coordinated with the other [facility] and patient agreeable.

## 2024-06-18 ENCOUNTER — ORDERS ONLY (AUTO-RELEASED) (OUTPATIENT)
Dept: PEDIATRICS | Facility: CLINIC | Age: 74
End: 2024-06-18
Payer: COMMERCIAL

## 2024-06-18 DIAGNOSIS — R00.2 PALPITATIONS: ICD-10-CM

## 2024-07-03 PROCEDURE — 93244 EXT ECG>48HR<7D REV&INTERPJ: CPT | Performed by: INTERNAL MEDICINE

## 2024-07-15 ENCOUNTER — OFFICE VISIT (OUTPATIENT)
Dept: FAMILY MEDICINE | Facility: CLINIC | Age: 74
End: 2024-07-15
Payer: COMMERCIAL

## 2024-07-15 VITALS
BODY MASS INDEX: 30.25 KG/M2 | HEIGHT: 61 IN | WEIGHT: 160.2 LBS | HEART RATE: 63 BPM | SYSTOLIC BLOOD PRESSURE: 134 MMHG | TEMPERATURE: 98 F | OXYGEN SATURATION: 95 % | DIASTOLIC BLOOD PRESSURE: 61 MMHG | RESPIRATION RATE: 16 BRPM

## 2024-07-15 DIAGNOSIS — F32.0 CURRENT MILD EPISODE OF MAJOR DEPRESSIVE DISORDER, UNSPECIFIED WHETHER RECURRENT (H): ICD-10-CM

## 2024-07-15 DIAGNOSIS — R07.9 CHEST PAIN, UNSPECIFIED TYPE: Primary | ICD-10-CM

## 2024-07-15 DIAGNOSIS — I77.810 DILATION OF THORACIC AORTA (H): ICD-10-CM

## 2024-07-15 PROCEDURE — 99214 OFFICE O/P EST MOD 30 MIN: CPT | Performed by: PHYSICIAN ASSISTANT

## 2024-07-15 NOTE — PROGRESS NOTES
Assessment & Plan     Chest pain, unspecified type  No recurrent symptoms of chest pain since occurrence in June.  However, given age as well as left ventricular hypertrophy and sleep apnea and BMI, elevated risk for possible coronary artery disease.  Recommending formal stress echocardiogram for evaluation of this as well as echo aspects to possibly monitor hypertrophy and diastolic dysfunction.  Of note, this echo may be helpful in terms of monitoring thoracic aorta dilation as well.  However on past echoes this has not been mentioned and she may need to continue considering CT evaluations for ongoing monitoring.  Does not look like this has been rechecked since 2021..  If unable to obtain CTs with contrast through pulmonology, recommending discussing with PCP I am obtaining routine monitoring of this at a later date.  - Echocardiogram Exercise Stress; Future    Dilation of thoracic aorta (H24)  As above    Current mild episode of major depressive disorder, unspecified whether recurrent (H24)  Stable PHQ-9 as below      11/7/2023    11:07 AM 2/26/2024     4:54 PM 3/31/2024    12:08 PM   PHQ   PHQ-9 Total Score 5 4 5   Q9: Thoughts of better off dead/self-harm past 2 weeks Not at all Not at all Not at all           Subjective   Tayler is a 74 year old, presenting for the following health issues:  Follow Up (Follow up [facility] 6/17/24 Palpitations/Chest Pressure; still having intermittent palpitations)        7/15/2024    11:12 AM   Additional Questions   Roomed by Mercy BREWER LPN     History of Present Illness       Reason for visit:  Irregular pulse with mild left chest pain. Seen that day, June 17 at [facility]  Symptom onset:  1-3 days ago  Symptoms include:  Had irregular heart rate and mild left chest discomfort  Symptom intensity:  Mild  Symptom progression:  Improving  Had these symptoms before:  No  What makes it worse:  No  What makes it better:  No    She eats 2-3 servings of fruits and  "vegetables daily.She consumes 0 sweetened beverage(s) daily.She exercises with enough effort to increase her heart rate 9 or less minutes per day.  She exercises with enough effort to increase her heart rate 3 or less days per week.   She is taking medications regularly.     Patient is a 74-year-old female with a past history of major depressive disorder, sleep apnea, pulmonary nodules followed closely by pulmonology, left ventricular hypertrophy and diastolic dysfunction.  She presents today after experiencing an acute onsets of palpitations and chest discomfort on June 17, 2024.  She discussed this with triage nurse and PCP who recommended [facility] evaluation.  While at [facility] troponin was slightly elevated at 15 and remained stable at 15 on repeat.  Her chest pains were resolved.  Her palpitations at time of visit was resolved.  EKG showed no concerns.  Zio patch was obtained which has since resulted showing symptomatic PVCs and short runs of both ventricular tachycardia as well as supraventricular tachycardia.  Of note, she has had a Holter monitor in 2015 as well showing symptomatic PACs.  She has seen cardiology in the past for the PACs who stated they could give a beta-blocker if she wished but patient prefers not to take medicines if not concerning.    She presents today for follow-up and to discuss ongoing monitoring and management.  She denies any chest pains and has had very rare occurrences of palpitations lasting for seconds.    Upon her onset of above symptoms, her power went out of her home and her CPAP was no longer working.  She woke up with the symptoms.      Of note, past CTs as recent as 2021 has shown stable and ongoing mild thoracic aneurysm at 3.7 cm.  She continues to have updated CTs without contrast through pulmonology for nodule monitoring.            Objective    /61   Pulse 63   Temp 98  F (36.7  C) (Oral)   Resp 16   Ht 1.549 m (5' 1\")   Wt 72.7 kg (160 lb 3.2 oz)   LMP  (LMP " Unknown)   SpO2 95%   BMI 30.27 kg/m    Body mass index is 30.27 kg/m .  Physical Exam   GENERAL: alert and no distress  RESP: lungs clear to auscultation - no rales, rhonchi or wheezes  CV: regular rates and rhythm, normal S1 S2, no S3 or S4, and no murmur, click or rub  PSYCH: mentation appears normal, affect normal/bright          Signed Electronically by: Sam Thakur PA-C

## 2024-08-17 DIAGNOSIS — K21.00 GASTROESOPHAGEAL REFLUX DISEASE WITH ESOPHAGITIS WITHOUT HEMORRHAGE: ICD-10-CM

## 2024-08-19 RX ORDER — FAMOTIDINE 20 MG/1
20 TABLET, FILM COATED ORAL 2 TIMES DAILY
Qty: 200 TABLET | Refills: 2 | OUTPATIENT
Start: 2024-08-19

## 2024-09-06 ENCOUNTER — HOSPITAL ENCOUNTER (OUTPATIENT)
Dept: CARDIOLOGY | Facility: HOSPITAL | Age: 74
Discharge: HOME | End: 2024-09-06
Attending: PHYSICIAN ASSISTANT | Admitting: PHYSICIAN ASSISTANT
Payer: COMMERCIAL

## 2024-09-06 DIAGNOSIS — R07.9 CHEST PAIN, UNSPECIFIED TYPE: ICD-10-CM

## 2024-09-06 LAB
CV STRESS CURRENT BP HE: NORMAL
CV STRESS CURRENT HR HE: 104
CV STRESS CURRENT HR HE: 115
CV STRESS CURRENT HR HE: 117
CV STRESS CURRENT HR HE: 118
CV STRESS CURRENT HR HE: 125
CV STRESS CURRENT HR HE: 125
CV STRESS CURRENT HR HE: 126
CV STRESS CURRENT HR HE: 132
CV STRESS CURRENT HR HE: 132
CV STRESS CURRENT HR HE: 57
CV STRESS CURRENT HR HE: 60
CV STRESS CURRENT HR HE: 65
CV STRESS CURRENT HR HE: 67
CV STRESS CURRENT HR HE: 71
CV STRESS CURRENT HR HE: 71
CV STRESS CURRENT HR HE: 73
CV STRESS CURRENT HR HE: 77
CV STRESS CURRENT HR HE: 79
CV STRESS CURRENT HR HE: 83
CV STRESS CURRENT HR HE: 87
CV STRESS CURRENT HR HE: 90
CV STRESS CURRENT HR HE: 98
CV STRESS DEVIATION TIME HE: NORMAL
CV STRESS ECHO PERCENT HR HE: NORMAL
CV STRESS EXERCISE STAGE HE: NORMAL
CV STRESS EXERCISE STAGE REACHED HE: NORMAL
CV STRESS FINAL RESTING BP HE: NORMAL
CV STRESS FINAL RESTING HR HE: 67
CV STRESS MAX HR HE: 132
CV STRESS MAX TREADMILL GRADE HE: 12
CV STRESS MAX TREADMILL SPEED HE: 2.5
CV STRESS PEAK DIA BP HE: NORMAL
CV STRESS PEAK SYS BP HE: NORMAL
CV STRESS PHASE HE: NORMAL
CV STRESS PROTOCOL HE: NORMAL
CV STRESS REASON STOPPED HE: NORMAL
CV STRESS RESTING PT POSITION HE: NORMAL
CV STRESS RESTING PT POSITION HE: NORMAL
CV STRESS ST DEVIATION AMOUNT HE: NORMAL
CV STRESS ST DEVIATION ELEVATION HE: NORMAL
CV STRESS ST EVELATION AMOUNT HE: NORMAL
CV STRESS SYMPTOMS HE: NORMAL
CV STRESS TEST TYPE HE: NORMAL
CV STRESS TOTAL STAGE TIME MIN 1 HE: NORMAL
STRESS ECHO BASELINE DIASTOLIC HE: 60
STRESS ECHO BASELINE HR: 59
STRESS ECHO BASELINE SYSTOLIC BP: 132
STRESS ECHO LAST STRESS DIASTOLIC BP: 60
STRESS ECHO LAST STRESS HR: 132
STRESS ECHO LAST STRESS SYSTOLIC BP: 160
STRESS ECHO POST ESTIMATED WORKLOAD: 7.1
STRESS ECHO POST EXERCISE DUR MIN: 5
STRESS ECHO POST EXERCISE DUR SEC: 59
STRESS ECHO TARGET HR: 123

## 2024-09-06 PROCEDURE — 93352 ADMIN ECG CONTRAST AGENT: CPT | Performed by: INTERNAL MEDICINE

## 2024-09-06 PROCEDURE — 93018 CV STRESS TEST I&R ONLY: CPT | Performed by: INTERNAL MEDICINE

## 2024-09-06 PROCEDURE — 93321 DOPPLER ECHO F-UP/LMTD STD: CPT | Mod: 26 | Performed by: INTERNAL MEDICINE

## 2024-09-06 PROCEDURE — 93325 DOPPLER ECHO COLOR FLOW MAPG: CPT | Mod: TC

## 2024-09-06 PROCEDURE — 93016 CV STRESS TEST SUPVJ ONLY: CPT | Performed by: INTERNAL MEDICINE

## 2024-09-06 PROCEDURE — 93325 DOPPLER ECHO COLOR FLOW MAPG: CPT | Mod: 26 | Performed by: INTERNAL MEDICINE

## 2024-09-06 PROCEDURE — 93350 STRESS TTE ONLY: CPT | Mod: 26 | Performed by: INTERNAL MEDICINE

## 2024-09-06 PROCEDURE — C8928 TTE W OR W/O FOL W/CON,STRES: HCPCS

## 2024-09-06 PROCEDURE — 255N000002 HC RX 255 OP 636: Performed by: PHYSICIAN ASSISTANT

## 2024-09-06 RX ADMIN — PERFLUTREN 4 ML: 6.52 INJECTION, SUSPENSION INTRAVENOUS at 09:54

## 2024-10-07 ENCOUNTER — TELEPHONE (OUTPATIENT)
Dept: FAMILY MEDICINE | Facility: CLINIC | Age: 74
End: 2024-10-07

## 2024-10-07 NOTE — TELEPHONE ENCOUNTER
I will write an appeal letter.  I am not sure what else needs to be done.  Patient does have a past history of mild hypertrophy of the left ventricle as well as dilation of the thoracic aorta.  A stress echocardiogram was ordered to assess for not only ischemia that may have been resulting in her chest pains but also for any wall motion abnormalities that may have been playing a role in her chest pains as well given her past history of hypertrophy in her thoracic aortic dilation.    Santosh Thakur PA-C

## 2024-10-07 NOTE — TELEPHONE ENCOUNTER
The patient called to inform us that she contacted her insurance prior to her stress echo test, but has now received a letter stating that the procedure is not covered. The insurance company mentioned they would send an appeal letter to Dr. Corral. The patient wants to know if Dr. Corral has received this letter from [employer]. Please call the patient back to update her.

## 2024-10-07 NOTE — TELEPHONE ENCOUNTER
Can we send this to the billing department by chance?  She has already had her stress echo and I was under the impression all of these image testing procedures are preapproved before obtained.  If her insurance did not cover this, it should have been caught before he was done    Santosh Thakur PA-C

## 2024-10-07 NOTE — TELEPHONE ENCOUNTER
RN called billing. Billing reports that they have talked with the patient about this issue. Patient is working on gathering information from her insurance company as to how to appeal the denial decision.    Ordering provider can help by appealing

## 2024-10-07 NOTE — LETTER
October 7, 2024      Tayler Mcmahon  [id number] [facility] [address]        Subject: Appeal for Coverage of Stress Echocardiogram for Tayler Mcmahon, Case Number: [id number]     Dear [employer] / [employer],    I am writing to formally appeal the denial of coverage for a stress echocardiogram for my patient, Tayler Mcmahon, whose case number is [id number] . As Tayler Mcmahon's healthcare provider, I believe that a stress echocardiogram is medically necessary due to his/her significant cardiac history.    Tayler Mcmahon has a documented history of left ventricular hypertrophy and dilation of the thoracic aorta. These conditions pose an increased risk for cardiac complications, and thorough evaluation is essential for proper management and treatment planning. A standard exercise stress test may not provide sufficient diagnostic information for a patient with such complex cardiac history.    A stress echocardiogram offers a more comprehensive assessment by allowing visualization of cardiac wall motion and function under stress, which is crucial in detecting ischemia or other abnormalities in patients with left ventricular hypertrophy. Additionally, given the dilation of the thoracic aorta, it is imperative to assess the heart's response under stress conditions to prevent potential adverse outcomes.    The American College of Cardiology and the American Heart Association recognize the value of stress echocardiograms in such scenarios, emphasizing their role in the evaluation of cardiac patients with structural heart disease. Therefore, it is in the best interest of Tayler Mcmahon to undergo a stress echocardiogram to ensure accurate diagnosis and optimal treatment.    Sincerely,        Sam Thakur PAC

## 2024-10-08 NOTE — TELEPHONE ENCOUNTER
Central PA team,    Can you please jump in. Santosh wrote an appeal letter located in the letter section on this patient's chart.    What are our next steps?

## 2024-10-11 DIAGNOSIS — F32.A DEPRESSION, UNSPECIFIED DEPRESSION TYPE: ICD-10-CM

## 2024-10-11 DIAGNOSIS — R45.86 MOOD CHANGES: ICD-10-CM

## 2024-10-13 DIAGNOSIS — K21.00 GASTROESOPHAGEAL REFLUX DISEASE WITH ESOPHAGITIS WITHOUT HEMORRHAGE: ICD-10-CM

## 2024-10-14 RX ORDER — FLUOXETINE 10 MG/1
CAPSULE ORAL
Qty: 90 CAPSULE | Refills: 3 | Status: SHIPPED | OUTPATIENT
Start: 2024-10-14

## 2024-10-14 RX ORDER — FAMOTIDINE 20 MG/1
20 TABLET, FILM COATED ORAL 2 TIMES DAILY
Qty: 30 TABLET | Refills: 0 | Status: SHIPPED | OUTPATIENT
Start: 2024-10-14 | End: 2024-10-29

## 2024-10-29 DIAGNOSIS — K21.00 GASTROESOPHAGEAL REFLUX DISEASE WITH ESOPHAGITIS WITHOUT HEMORRHAGE: ICD-10-CM

## 2024-10-29 RX ORDER — FAMOTIDINE 20 MG/1
20 TABLET, FILM COATED ORAL 2 TIMES DAILY
Qty: 40 TABLET | Refills: 0 | Status: SHIPPED | OUTPATIENT
Start: 2024-10-29 | End: 2024-11-13

## 2024-11-11 DIAGNOSIS — K21.00 GASTROESOPHAGEAL REFLUX DISEASE WITH ESOPHAGITIS WITHOUT HEMORRHAGE: ICD-10-CM

## 2024-11-13 DIAGNOSIS — K21.00 GASTROESOPHAGEAL REFLUX DISEASE WITH ESOPHAGITIS WITHOUT HEMORRHAGE: ICD-10-CM

## 2024-11-13 RX ORDER — FAMOTIDINE 20 MG/1
20 TABLET, FILM COATED ORAL 2 TIMES DAILY
Qty: 180 TABLET | Refills: 1 | Status: SHIPPED | OUTPATIENT
Start: 2024-11-13

## 2024-11-18 ENCOUNTER — OFFICE VISIT (OUTPATIENT)
Dept: FAMILY MEDICINE | Facility: CLINIC | Age: 74
End: 2024-11-18
Attending: FAMILY MEDICINE
Payer: COMMERCIAL

## 2024-11-18 VITALS
HEIGHT: 61 IN | TEMPERATURE: 98.1 F | BODY MASS INDEX: 28.47 KG/M2 | RESPIRATION RATE: 16 BRPM | WEIGHT: 150.8 LBS | HEART RATE: 59 BPM | DIASTOLIC BLOOD PRESSURE: 66 MMHG | SYSTOLIC BLOOD PRESSURE: 135 MMHG | OXYGEN SATURATION: 91 %

## 2024-11-18 DIAGNOSIS — I77.810 DILATION OF THORACIC AORTA (H): ICD-10-CM

## 2024-11-18 DIAGNOSIS — F32.A DEPRESSION, UNSPECIFIED DEPRESSION TYPE: ICD-10-CM

## 2024-11-18 DIAGNOSIS — Z12.31 VISIT FOR SCREENING MAMMOGRAM: ICD-10-CM

## 2024-11-18 DIAGNOSIS — F32.0 CURRENT MILD EPISODE OF MAJOR DEPRESSIVE DISORDER, UNSPECIFIED WHETHER RECURRENT (H): ICD-10-CM

## 2024-11-18 DIAGNOSIS — G47.00 INSOMNIA, UNSPECIFIED TYPE: ICD-10-CM

## 2024-11-18 DIAGNOSIS — I47.20 VENTRICULAR TACHYCARDIA (H): ICD-10-CM

## 2024-11-18 DIAGNOSIS — G47.30 SLEEP APNEA, UNSPECIFIED TYPE: ICD-10-CM

## 2024-11-18 DIAGNOSIS — R45.86 MOOD CHANGES: ICD-10-CM

## 2024-11-18 DIAGNOSIS — K21.00 GASTROESOPHAGEAL REFLUX DISEASE WITH ESOPHAGITIS WITHOUT HEMORRHAGE: ICD-10-CM

## 2024-11-18 DIAGNOSIS — E78.5 HYPERLIPIDEMIA LDL GOAL <130: ICD-10-CM

## 2024-11-18 DIAGNOSIS — E55.9 VITAMIN D DEFICIENCY: ICD-10-CM

## 2024-11-18 DIAGNOSIS — R73.09 OTHER ABNORMAL GLUCOSE: ICD-10-CM

## 2024-11-18 DIAGNOSIS — Z00.00 ENCOUNTER FOR ANNUAL WELLNESS EXAM IN MEDICARE PATIENT: Primary | ICD-10-CM

## 2024-11-18 LAB
EST. AVERAGE GLUCOSE BLD GHB EST-MCNC: 108 MG/DL
HBA1C MFR BLD: 5.4 % (ref 0–5.6)

## 2024-11-18 PROCEDURE — G0439 PPPS, SUBSEQ VISIT: HCPCS | Performed by: FAMILY MEDICINE

## 2024-11-18 PROCEDURE — 99214 OFFICE O/P EST MOD 30 MIN: CPT | Mod: 25 | Performed by: FAMILY MEDICINE

## 2024-11-18 PROCEDURE — 80053 COMPREHEN METABOLIC PANEL: CPT | Performed by: FAMILY MEDICINE

## 2024-11-18 PROCEDURE — 82306 VITAMIN D 25 HYDROXY: CPT | Performed by: FAMILY MEDICINE

## 2024-11-18 PROCEDURE — 80061 LIPID PANEL: CPT | Performed by: FAMILY MEDICINE

## 2024-11-18 PROCEDURE — 83036 HEMOGLOBIN GLYCOSYLATED A1C: CPT | Performed by: FAMILY MEDICINE

## 2024-11-18 PROCEDURE — 36415 COLL VENOUS BLD VENIPUNCTURE: CPT | Performed by: FAMILY MEDICINE

## 2024-11-18 RX ORDER — FLUOXETINE 10 MG/1
CAPSULE ORAL
Qty: 90 CAPSULE | Refills: 3 | Status: SHIPPED | OUTPATIENT
Start: 2024-11-18

## 2024-11-18 RX ORDER — FAMOTIDINE 20 MG/1
20 TABLET, FILM COATED ORAL 2 TIMES DAILY
Qty: 180 TABLET | Refills: 3 | Status: SHIPPED | OUTPATIENT
Start: 2024-11-18

## 2024-11-18 RX ORDER — TRAZODONE HYDROCHLORIDE 50 MG/1
TABLET, FILM COATED ORAL
Qty: 180 TABLET | Refills: 3 | Status: SHIPPED | OUTPATIENT
Start: 2024-11-18

## 2024-11-18 SDOH — HEALTH STABILITY: PHYSICAL HEALTH: ON AVERAGE, HOW MANY MINUTES DO YOU ENGAGE IN EXERCISE AT THIS LEVEL?: 30 MIN

## 2024-11-18 SDOH — HEALTH STABILITY: PHYSICAL HEALTH: ON AVERAGE, HOW MANY DAYS PER WEEK DO YOU ENGAGE IN MODERATE TO STRENUOUS EXERCISE (LIKE A BRISK WALK)?: 3 DAYS

## 2024-11-18 ASSESSMENT — PATIENT HEALTH QUESTIONNAIRE - PHQ9
SUM OF ALL RESPONSES TO PHQ QUESTIONS 1-9: 2
10. IF YOU CHECKED OFF ANY PROBLEMS, HOW DIFFICULT HAVE THESE PROBLEMS MADE IT FOR YOU TO DO YOUR WORK, TAKE CARE OF THINGS AT HOME, OR GET ALONG WITH OTHER PEOPLE: NOT DIFFICULT AT ALL
SUM OF ALL RESPONSES TO PHQ QUESTIONS 1-9: 2

## 2024-11-18 ASSESSMENT — SOCIAL DETERMINANTS OF HEALTH (SDOH)
HOW OFTEN DO YOU GET TOGETHER WITH FRIENDS OR RELATIVES?: ONCE A WEEK
HOW OFTEN DO YOU GET TOGETHER WITH FRIENDS OR RELATIVES?: ONCE A WEEK

## 2024-11-18 NOTE — PROGRESS NOTES
Preventive Care Visit  [facility]  Rissa Corral MD, Family Medicine  Nov 18, 2024      Assessment & Plan       ICD-10-CM    1. Encounter for annual wellness exam in Medicare patient  Z00.00       2. Depression, unspecified depression type  F32.A FLUoxetine (PROZAC) 10 MG capsule      3. Mood changes  R45.86 FLUoxetine (PROZAC) 20 MG capsule      4. Gastroesophageal reflux disease with esophagitis without hemorrhage  K21.00 famotidine (PEPCID) 20 MG tablet     omeprazole (PRILOSEC) 20 MG DR capsule      5. Insomnia, unspecified type  G47.00 traZODone (DESYREL) 50 MG tablet      6. Hyperlipidemia LDL goal <130  E78.5 Lipid panel reflex to direct LDL Fasting     Lipid panel reflex to direct LDL Fasting      7. Current mild episode of major depressive disorder, unspecified whether recurrent (H)  F32.0       8. Dilation of thoracic aorta (H)  I77.810       9. Sleep apnea, unspecified type  G47.30       10. Prediabetes  R73.09 Hemoglobin A1c     Hemoglobin A1c      11. Visit for screening mammogram  Z12.31 MA Screen Bilateral w/Iglesia      12. Ventricular tachycardia (H)  I47.20 Adult Cardiology Eval [facility] Referral     Comprehensive metabolic panel (BMP + Alb, Alk Phos, ALT, AST, Total. Bili, TP)     Comprehensive metabolic panel (BMP + Alb, Alk Phos, ALT, AST, Total. Bili, TP)      13. Vitamin D deficiency  E55.9 Vitamin D Deficiency     Vitamin D Deficiency        Recommend 3 dairy servings a day or calcium with vitamin D twice a day with food.    We would like a copy of your living will please.    I am glad you are not on anything for blood pressure but if you are feeling dizzy you could check your blood pressure and let us know.  Next if you are feeling any recurrent palpitations or racing heart we should do a longer monitor and we can do a 30-day monitor that is placed on at the hospital.    Your heart monitor did show some runs of ventricular tachycardia.  I like you to visit  "with an EP or electrophysiology cardiologist.  [facility] will call you to coordinate your care as prescribed by your provider. If you don't hear from a representative within 2 business days, please call [phone].     When you see cardiology of course discussed the heart monitor that showed the runs of ventricular tachycardia and see if they are recommending a regular echocardiogram to look at heart function, heart valves and recheck the dilated thoracic aorta.    I am glad you follow with the pulmonary doctor for your sleep apnea.  You could see if they recommend another chest CT but the pulmonary nodules have been stable.    Please set dermatology appointment for full body skin check.    If hip pain is persisting let me know if you need a referral to [facility].    If you are thinking about having the cataract surgery we need to do a preop within 30 days and you could let us know.    The total time spent preparing to see this patient, face-to-face time and coordination of care time on the same calendar date for this visit was 40 minutes, 10 minutes of which was for the Medicare wellness visit.    Patient has been advised of split billing requirements and indicates understanding: Yes        BMI  Estimated body mass index is 28.49 kg/m  as calculated from the following:    Height as of this encounter: 1.549 m (5' 1\").    Weight as of this encounter: 68.4 kg (150 lb 12.8 oz).   Weight management plan: Discussed healthy diet and exercise guidelines    Counseling  Appropriate preventive services were addressed with this patient via screening, questionnaire, or discussion as appropriate for fall prevention, nutrition, physical activity, Tobacco-use cessation, social engagement, weight loss and cognition.  Checklist reviewing preventive services available has been given to the patient.  Reviewed patient's diet, addressing concerns and/or questions.   She is at risk for lack of exercise and has been provided " with information to increase physical activity for the benefit of her well-being.   The patient was provided with written information regarding signs of hearing loss.   Information on urinary incontinence and treatment options given to patient.           Elin Lester is a 74 year old, presenting for the following:  Physical (Annual Wellness. Would like to discuss Echo results )        11/18/2024    12:40 PM   Additional Questions   Roomed by Althea COLES CMA           HPI    Low bone mass with high risk of fracture: Patient is not wishing to be on any medication.  Because of that she is not wishing another bone density scan.  She does get 3 dairy servings in a day and weightbearing exercise.    Mood:  Doing ok for what is going on with her family.  Would like to stay on same dose of Prozac and Trazadone.    Sleep apnea:  Wear CPAP.  Sleeping soundly and power went off, her partner Luna woke up and woke her up and her heart was racing.  Thinks she was not getting enough oxygen.  Then had left chest pressure.  Thought she should go to the ER and then went to sleep.  Woke up in am and went to ER.    ER follow-up:  Summer 2024, see above, chest pressure.  Troponin elevated at 15.  Stress echo ordered and she did have that and that was normal.  Also had a Zio patch and it did show the below readings including ventricular tachycardia.  She is not seeing a cardiologist since that was done.  Further chest pain.  She will get dizzy at times.  She does not take her blood pressure when she is dizzy.  She is checking heart rate on her watch and has had some PACs PVCs but no atrial fibrillation or ventricular tachycardia that she knows of.    7-day cardiac monitor.  12 VT runs, longest 5 beats.  28 SVT runs, longest 11 beats.  Patient activations correlated with ectopic beats, primarily PVCs.    Weight loss:  trying to cut our carbs a lot and less calories down about 20 lbs from Nov 2024.    Social:  Her son and daughter in  law are not talking to her right now.  They have 2 children and Tayler has not seen them much.   She had done counseling for this.  Her daughter got a divorce and living with Tayler.     Health Care Directive  Patient does not have a Health Care Directive: Patient states has Advance Directive and will bring in a copy to clinic.      11/18/2024   General Health   How would you rate your overall physical health? Good   Feel stress (tense, anxious, or unable to sleep) Not at all            11/18/2024   Nutrition   Diet: Regular (no restrictions)            11/18/2024   Exercise   Days per week of moderate/strenous exercise 3 days   Average minutes spent exercising at this level 30 min            11/18/2024   Social Factors   Frequency of gathering with friends or relatives Once a week   Worry food won't last until get money to buy more No   Food not last or not have enough money for food? No   Do you have housing? (Housing is defined as stable permanent housing and does not include staying ouside in a car, in a tent, in an abandoned building, in an overnight shelter, or couch-surfing.) Yes   Are you worried about losing your housing? No   Lack of transportation? No   Unable to get utilities (heat,electricity)? No            11/18/2024   Fall Risk   Fallen 2 or more times in the past year? No     No    Trouble with walking or balance? No     No        Patient-reported    Multiple values from one day are sorted in reverse-chronological order          11/18/2024   Activities of Daily Living- Home Safety   Needs help with the following daily activites None of the above   Safety concerns in the home None of the above            11/18/2024   Dental   Dentist two times every year? Yes            11/18/2024   Hearing Screening   Hearing concerns? (!) I NEED TO ASK PEOPLE TO SPEAK UP OR REPEAT THEMSELVES.    (!) IT'S HARD TO FOLLOW A CONVERSATION IN A NOISY RESTAURANT OR CROWDED ROOM.    (!) TROUBLE UNDERSTANDING SOFT OR WHISPERED  SPEECH.       Multiple values from one day are sorted in reverse-chronological order         11/18/2024   Driving Risk Screening   Patient/family members have concerns about driving No            11/18/2024   General Alertness/Fatigue Screening   Have you been more tired than usual lately? No            11/18/2024   Urinary Incontinence Screening   Bothered by leaking urine in past 6 months Yes             Today's PHQ-9 Score:       11/18/2024    12:16 PM   PHQ-9 SCORE   PHQ-9 Total Score MyChart 2 (Minimal depression)   PHQ-9 Total Score 2        Patient-reported         11/18/2024   Substance Use   Alcohol more than 3/day or more than 7/wk No   Do you have a current opioid prescription? No   How severe/bad is pain from 1 to 10? 3/10   Do you use any other substances recreationally? No        Social History     Tobacco Use    Smoking status: Never    Smokeless tobacco: Never   Substance Use Topics    Alcohol use: No    Drug use: No           12/1/2023   LAST FHS-7 RESULTS   1st degree relative breast or ovarian cancer Yes   Any relative bilateral breast cancer No   Any male have breast cancer No   Any ONE woman have BOTH breast AND ovarian cancer No   Any woman with breast cancer before 50yrs No   2 or more relatives with breast AND/OR ovarian cancer No   2 or more relatives with breast AND/OR bowel cancer No           Mammogram Screening - After age 74- determine frequency with patient based on health status, life expectancy and patient goals    ASCVD Risk   The 10-year ASCVD risk score (Marcia DILLON, et al., 2019) is: 17.9%    Values used to calculate the score:      Age: 74 years      Sex: Female      Is Non- : No      Diabetic: No      Tobacco smoker: No      Systolic Blood Pressure: 135 mmHg      Is BP treated: No      HDL Cholesterol: 49 mg/dL      Total Cholesterol: 240 mg/dL            Reviewed and updated as needed this visit by Provider                      Current providers  "sharing in care for this patient include:  Patient Care Team:  Rissa Corral MD as PCP - General (Family Medicine)  Rissa Corral MD as Assigned PCP  Klever Burnette MD as MD (Endocrinology, Diabetes, and Metabolism)  Fabiana Escamilla LICSW as Assigned Behavioral Health Provider    The following health maintenance items are reviewed in Epic and correct as of today:  Health Maintenance   Topic Date Due    LIPID  Today    ANNUAL REVIEW OF HM ORDERS  Today    MEDICARE ANNUAL WELLNESS VISIT  Today    PHQ-9  Today    FALL RISK ASSESSMENT  Today    COLORECTAL CANCER SCREENING  04/26/2027    GLUCOSE  Today    DTAP/TDAP/TD IMMUNIZATION (3 - Td or Tdap) 05/08/2028    ADVANCE CARE PLANNING  We would haleigh a copy please    DEXA  Declined    HEPATITIS C SCREENING  Completed    DEPRESSION ACTION PLAN  Completed    INFLUENZA VACCINE  Completed    Pneumococcal Vaccine: 65+ Years  Completed    ZOSTER IMMUNIZATION  Completed    RSV VACCINE  Completed    COVID-19 Vaccine  Completed    HPV IMMUNIZATION  Aged Out    MENINGITIS IMMUNIZATION  Aged Out    RSV MONOCLONAL ANTIBODY  Aged Out    MAMMO SCREENING  Due in Dec. And ordered            Objective    Exam  /66 (BP Location: Left arm, Patient Position: Sitting, Cuff Size: Adult Large)   Pulse 59   Temp 98.1  F (36.7  C) (Oral)   Resp 16   Ht 1.549 m (5' 1\")   Wt 68.4 kg (150 lb 12.8 oz)   LMP  (LMP Unknown)   SpO2 91%   BMI 28.49 kg/m     Estimated body mass index is 28.49 kg/m  as calculated from the following:    Height as of this encounter: 1.549 m (5' 1\").    Weight as of this encounter: 68.4 kg (150 lb 12.8 oz).    Physical Exam  GENERAL: alert and no distress  EYES: Eyes grossly normal to inspection, PERRL and conjunctivae and sclerae normal  HENT: ear canals and TM's normal, nose and mouth without ulcers or lesions  NECK: no adenopathy, no asymmetry, masses, or scars  RESP: lungs clear to auscultation - no rales, rhonchi or wheezes  BREAST: normal " without masses, tenderness or nipple discharge and no palpable axillary masses or adenopathy  CV: regular rate and rhythm, normal S1 S2, no S3 or S4, no murmur, click or rub, no peripheral edema  ABDOMEN: soft, nontender, no hepatosplenomegaly, no masses and bowel sounds normal  MS: no gross musculoskeletal defects noted, no edema  SKIN: no suspicious lesions or rashes  NEURO: Normal strength and tone, mentation intact and speech normal  PSYCH: mentation appears normal, affect normal/bright         11/18/2024   Mini Cog   Clock Draw Score 0 Abnormal   3 Item Recall 3 objects recalled   Mini Cog Total Score 3                 Signed Electronically by: Rissa Corral MD    Answers submitted by the patient for this visit:  Patient Health Questionnaire (Submitted on 11/18/2024)  If you checked off any problems, how difficult have these problems made it for you to do your work, take care of things at home, or get along with other people?: Not difficult at all  PHQ9 TOTAL SCORE: 2

## 2024-11-18 NOTE — PATIENT INSTRUCTIONS
Recommend 3 dairy servings a day or calcium with vitamin D twice a day with food.    We would like a copy of your living will please.    I am glad you are not on anything for blood pressure but if you are feeling dizzy you could check your blood pressure and let us know.  Next if you are feeling any recurrent palpitations or racing heart we should do a longer monitor and we can do a 30-day monitor that is placed on at the hospital.    Your heart monitor did show some runs of ventricular tachycardia.  I like you to visit with an EP or electrophysiology cardiologist.  [facility] will call you to coordinate your care as prescribed by your provider. If you don't hear from a representative within 2 business days, please call [phone].     When you see cardiology of course discussed the heart monitor that showed the runs of ventricular tachycardia and see if they are recommending a regular echocardiogram to look at heart function, heart valves and recheck the dilated thoracic aorta.    I am glad you follow with the pulmonary doctor for your sleep apnea.  You could see if they recommend another chest CT but the pulmonary nodules have been stable.    Please set dermatology appointment for full body skin check.    If hip pain is persisting let me know if you need a referral to [facility].    If you are thinking about having the cataract surgery we need to do a preop within 30 days and you could let us know.      Health Maintenance   Topic Date Due    LIPID  Today    ANNUAL REVIEW OF HM ORDERS  Today    MEDICARE ANNUAL WELLNESS VISIT  Today    PHQ-9  Today    FALL RISK ASSESSMENT  Today    COLORECTAL CANCER SCREENING  04/26/2027    GLUCOSE  Today    DTAP/TDAP/TD IMMUNIZATION (3 - Td or Tdap) 05/08/2028    ADVANCE CARE PLANNING  We would haleigh a copy please    DEXA  Declined    HEPATITIS C SCREENING  Completed    DEPRESSION ACTION PLAN  Completed    INFLUENZA VACCINE  Completed    Pneumococcal Vaccine: 65+ Years   Completed    ZOSTER IMMUNIZATION  Completed    RSV VACCINE  Completed    COVID-19 Vaccine  Completed    HPV IMMUNIZATION  Aged Out    MENINGITIS IMMUNIZATION  Aged Out    RSV MONOCLONAL ANTIBODY  Aged Out    MAMMO SCREENING  Due in Dec. And ordered

## 2024-11-19 ENCOUNTER — PATIENT OUTREACH (OUTPATIENT)
Dept: CARE COORDINATION | Facility: CLINIC | Age: 74
End: 2024-11-19
Payer: COMMERCIAL

## 2024-11-19 LAB
ALBUMIN SERPL BCG-MCNC: 4.4 G/DL (ref 3.5–5.2)
ALP SERPL-CCNC: 63 U/L (ref 40–150)
ALT SERPL W P-5'-P-CCNC: 19 U/L (ref 0–50)
ANION GAP SERPL CALCULATED.3IONS-SCNC: 13 MMOL/L (ref 7–15)
AST SERPL W P-5'-P-CCNC: 20 U/L (ref 0–45)
BILIRUB SERPL-MCNC: 0.6 MG/DL
BUN SERPL-MCNC: 21.1 MG/DL (ref 8–23)
CALCIUM SERPL-MCNC: 10 MG/DL (ref 8.8–10.4)
CHLORIDE SERPL-SCNC: 102 MMOL/L (ref 98–107)
CHOLEST SERPL-MCNC: 212 MG/DL
CREAT SERPL-MCNC: 0.86 MG/DL (ref 0.51–0.95)
EGFRCR SERPLBLD CKD-EPI 2021: 70 ML/MIN/1.73M2
FASTING STATUS PATIENT QL REPORTED: ABNORMAL
FASTING STATUS PATIENT QL REPORTED: ABNORMAL
GLUCOSE SERPL-MCNC: 103 MG/DL (ref 70–99)
HCO3 SERPL-SCNC: 27 MMOL/L (ref 22–29)
HDLC SERPL-MCNC: 58 MG/DL
LDLC SERPL CALC-MCNC: 128 MG/DL
NONHDLC SERPL-MCNC: 154 MG/DL
POTASSIUM SERPL-SCNC: 4.8 MMOL/L (ref 3.4–5.3)
PROT SERPL-MCNC: 7.3 G/DL (ref 6.4–8.3)
SODIUM SERPL-SCNC: 142 MMOL/L (ref 135–145)
TRIGL SERPL-MCNC: 131 MG/DL
VIT D+METAB SERPL-MCNC: 65 NG/ML (ref 20–50)

## 2024-12-05 ENCOUNTER — ANCILLARY PROCEDURE (OUTPATIENT)
Dept: MAMMOGRAPHY | Facility: CLINIC | Age: 74
End: 2024-12-05
Attending: FAMILY MEDICINE
Payer: COMMERCIAL

## 2024-12-05 DIAGNOSIS — Z12.31 VISIT FOR SCREENING MAMMOGRAM: ICD-10-CM

## 2024-12-05 PROCEDURE — 77067 SCR MAMMO BI INCL CAD: CPT

## 2024-12-05 PROCEDURE — 77063 BREAST TOMOSYNTHESIS BI: CPT

## 2024-12-06 DIAGNOSIS — E78.5 HYPERLIPIDEMIA LDL GOAL <130: ICD-10-CM

## 2024-12-06 DIAGNOSIS — K21.00 GASTROESOPHAGEAL REFLUX DISEASE WITH ESOPHAGITIS WITHOUT HEMORRHAGE: ICD-10-CM

## 2024-12-06 RX ORDER — ROSUVASTATIN CALCIUM 5 MG/1
5 TABLET, COATED ORAL DAILY
Qty: 90 TABLET | Refills: 3 | Status: CANCELLED | OUTPATIENT
Start: 2024-12-06

## 2024-12-06 NOTE — TELEPHONE ENCOUNTER
Attempted to call patient, received busy signal x3. Please try to call patient again later to confirm that she would like this prescription sent to mail order (see 11/22 MyChart enc). Thanks!    Teresita Davalos RN

## 2024-12-09 NOTE — TELEPHONE ENCOUNTER
Called patient. She will call or [employer] message when closer to finishing her current bottle of Rosuvastatin to have it switched to [facility].     Geovanny Caballero RN

## 2024-12-10 ENCOUNTER — ANCILLARY PROCEDURE (OUTPATIENT)
Dept: MAMMOGRAPHY | Facility: CLINIC | Age: 74
End: 2024-12-10
Attending: FAMILY MEDICINE
Payer: COMMERCIAL

## 2024-12-10 DIAGNOSIS — R92.8 ABNORMAL MAMMOGRAM: ICD-10-CM

## 2024-12-10 PROCEDURE — 77065 DX MAMMO INCL CAD UNI: CPT | Mod: LT

## 2024-12-10 PROCEDURE — 77061 BREAST TOMOSYNTHESIS UNI: CPT | Mod: LT

## 2025-01-08 ENCOUNTER — OFFICE VISIT (OUTPATIENT)
Dept: CARDIOLOGY | Facility: CLINIC | Age: 75
End: 2025-01-08
Attending: FAMILY MEDICINE
Payer: COMMERCIAL

## 2025-01-08 VITALS
HEART RATE: 64 BPM | RESPIRATION RATE: 16 BRPM | BODY MASS INDEX: 27.97 KG/M2 | HEIGHT: 62 IN | WEIGHT: 152 LBS | SYSTOLIC BLOOD PRESSURE: 138 MMHG | DIASTOLIC BLOOD PRESSURE: 68 MMHG

## 2025-01-08 DIAGNOSIS — I47.20 VENTRICULAR TACHYCARDIA (H): ICD-10-CM

## 2025-01-08 PROCEDURE — 99204 OFFICE O/P NEW MOD 45 MIN: CPT | Performed by: INTERNAL MEDICINE

## 2025-01-08 NOTE — PATIENT INSTRUCTIONS
[facility]  Cardiac Electrophysiology  [address]  [place] [address]   Office: [phone]  Fax: [phone]       Thank you for seeing us in clinic today - it is a pleasure to be a part of your care team.  Below is a summary of our plan from today's visit.      Continue current meds  Continue surveillance with your smart watch  Follow up with Cardiology on an as needed basis    Please do not hesitate to be in touch with our office at [phone] with any questions that may arise.      Thank you for trusting us with your care,    Nathaniel Jones MD  Clinical Cardiac Electrophysiology  [facility]  [address]  [place] [address]   Office: [phone]  Fax: [phone]

## 2025-01-08 NOTE — PROGRESS NOTES
HEART CARE ENCOUNTER CONSULTATON NOTE      [facility]  [phone]      Assessment/Recommendations   Assessment/Plan:    Tayler Mcmahon is a very pleasant 74 year old female with PMH of depression, hyperlipidemia, GERD, sleep apnea who presents today to the EP clinic.    Palpitations  - short suns of SVT and possible NSVT(which may be SVT with aberrancy)  - Normal stress echocardiogram  - Continue to monitor at this time  - continue surveillance with your smart watch    2. Hyperlipidemia  - continue Crestor    3. GERD  - continue Pepcid and Prilosec      Time spent: 60 minutes spent on the date of the encounter doing chart review, history and exam, documentation and further activities as noted above.    The longitudinal plan of care for the condition(s) below were addressed during this visit. Due to the added complexity in care, I will continue support in the subsequent management of this condition(s) and with the ongoing continuity of care of this condition(s).          History of Present Illness/Subjective    HPI: Tayler Mcmahon is a very pleasant 74 year old female with PMH of depression, hyperlipidemia, GERD, sleep apnea who presents today to the EP clinic.    In June, Tayler was sleeping and there was a power cut while she was sleeping with a CPAP machine. She says she was woken up by a friend and she remembered having some palpitations and chest pressure at the time.     She had a Zio patch and a stress echocardiogram since then.    Zio patch showed 12 episodes of wide complex beats, upon my review these could be SVT with aberrancy vs NSVT. Low PAC/PVC burden.    Recent ECG(personally reviewed):  From June 2024 WNL    Recent Echocardiogram Results (personally reviewed):    1. Normal stress echocardiogram without evidence of stress induced ischemia.  2. Normal resting LV systolic performance with an ejection fraction of 60%.  There is normal improvement in left ventricular systolic  "performance with a  peak ejection fraction of 75%.  3. No ECG evidence of ischemia.  4. No anginal chest pain reported with exercise.  5. Average functional capacity for age.      Labs below reviewed personally     Physical Examination  Review of Systems   Vitals: /68 (BP Location: Left arm, Patient Position: Sitting, Cuff Size: Adult Large)   Pulse 64   Resp 16   Ht 1.575 m (5' 2\")   Wt 68.9 kg (152 lb)   LMP  (LMP Unknown)   BMI 27.80 kg/m    BMI= Body mass index is 27.8 kg/m .  Wt Readings from Last 3 Encounters:   01/08/25 68.9 kg (152 lb)   11/18/24 68.4 kg (150 lb 12.8 oz)   07/15/24 72.7 kg (160 lb 3.2 oz)       General Appearance:   no distress, normal body habitus   ENT/Mouth: membranes moist, no oral lesions or bleeding gums.      EYES:  no scleral icterus, normal conjunctivae   Neck: no carotid bruits or thyromegaly   Chest/Lungs:   lungs are clear to auscultation, no rales or wheezing, no sternal scar, equal chest wall expansion    Cardiovascular:   Regular. Normal first and second heart sounds with no murmurs, rubs, or gallops; the carotid, radial and posterior tibial pulses are intact, no edema bilaterally    Abdomen:  no organomegaly, masses, bruits, or tenderness; bowel sounds are present   Extremities: no cyanosis or clubbing   Skin: no xanthelasma, warm.    Neurologic: normal  bilateral, no tremors     Psychiatric: alert and oriented x3, calm        Please refer above for cardiac ROS details.        Medical History  Surgical History Family History Social History   Past Medical History:   Diagnosis Date    Appendicitis 11/17/2016    Depression     Diverticulosis     Fibrocystic breast     HLD (hyperlipidemia)     Inverted nipple     PONV (postoperative nausea and vomiting)      Past Surgical History:   Procedure Laterality Date    ARTHROSCOPY KNEE Left 10/23/2006    Dr. Calvo at [place]    BELPHAROPTOSIS REPAIR Left     HYSTEROSCOPY DIAGNOSTIC      Recorded: 08/20/2008;  Comments: With " polyp removed, told benign    LAPAROSCOPIC APPENDECTOMY N/A 11/17/2016    Surgeon: Italo Fontaine MD;  Location: [facility] OR;  Service:     MAMMOPLASTY REDUCTION  1982     Family History   Problem Relation Age of Onset    Hypertension Mother     Transient ischemic attack Mother     Depression Mother     Polymyalgia rheumatica Mother     Lung Cancer Mother 84.00    Diabetes Type 2  Mother     Cancer Mother     Cancer Father 56.00        Tonsil    Colon Cancer Father 57.00    Breast Cancer Maternal Grandmother         mastectomy        Social History     Socioeconomic History    Marital status:      Spouse name: Not on file    Number of children: Not on file    Years of education: Not on file    Highest education level: Not on file   Occupational History    Not on file   Tobacco Use    Smoking status: Never    Smokeless tobacco: Never   Substance and Sexual Activity    Alcohol use: No    Drug use: No    Sexual activity: Never   Other Topics Concern    Not on file   Social History Narrative    Not on file     Social Drivers of Health     Financial Resource Strain: Low Risk  (11/18/2024)    Financial Resource Strain     Within the past 12 months, have you or your family members you live with been unable to get utilities (heat, electricity) when it was really needed?: No   Food Insecurity: Low Risk  (11/18/2024)    Food Insecurity     Within the past 12 months, did you worry that your food would run out before you got money to buy more?: No     Within the past 12 months, did the food you bought just not last and you didn t have money to get more?: No   Transportation Needs: Low Risk  (11/18/2024)    Transportation Needs     Within the past 12 months, has lack of transportation kept you from medical appointments, getting your medicines, non-medical meetings or appointments, work, or from getting things that you need?: No   Physical Activity: Insufficiently Active (11/18/2024)    Exercise Vital Sign      Days of Exercise per Week: 3 days     Minutes of Exercise per Session: 30 min   Stress: No Stress Concern Present (11/18/2024)    [employer] - Occupational Stress Questionnaire     Feeling of Stress : Not at all   Social Connections: Unknown (11/18/2024)    Social Connection and Isolation Panel [NHANES]     Frequency of Communication with Friends and Family: Not on file     Frequency of Social Gatherings with Friends and Family: Once a week     Attends Temple Services: Not on file     Active Member of Clubs or Organizations: Not on file     Attends Club or Organization Meetings: Not on file     Marital Status: Not on file   Interpersonal Safety: Low Risk  (11/18/2024)    Interpersonal Safety     Do you feel physically and emotionally safe where you currently live?: Yes     Within the past 12 months, have you been hit, slapped, kicked or otherwise physically hurt by someone?: No     Within the past 12 months, have you been humiliated or emotionally abused in other ways by your partner or ex-partner?: No   Housing Stability: Low Risk  (11/18/2024)    Housing Stability     Do you have housing? : Yes     Are you worried about losing your housing?: No           Medications  Allergies   Current Outpatient Medications   Medication Sig Dispense Refill    acetaminophen (TYLENOL) 500 MG tablet Take 500 mg by mouth every 6 hours as needed      albuterol (PROAIR HFA;PROVENTIL HFA;VENTOLIN HFA) 90 mcg/actuation inhaler [ALBUTEROL (PROAIR HFA;PROVENTIL HFA;VENTOLIN HFA) 90 MCG/ACTUATION INHALER] Inhale 2 puffs every 4 (four) hours as needed for wheezing. 1 each 0    cholecalciferol, vitamin D3, (VITAMIN D3) 2,000 unit cap [CHOLECALCIFEROL, VITAMIN D3, (VITAMIN D3) 2,000 UNIT CAP] Take 2,000 Units by mouth daily.      co-enzyme Q-10 100 MG CAPS capsule Take 100 mg by mouth daily      cyanocobalamin (VITAMIN B-12) 1000 MCG tablet Take 1,000 mcg by mouth daily 2500 mcg daily      cyclobenzaprine  (FLEXERIL) 10 MG tablet TAKE 1 TABLET BY MOUTH 3  TIMES DAILY AS NEEDED FOR  MUSCLE SPASM(S) 30 tablet 1    famotidine (PEPCID) 20 MG tablet Take 1 tablet (20 mg) by mouth 2 times daily. 180 tablet 3    FLUoxetine (PROZAC) 10 MG capsule TAKE 1 CAPSULE BY MOUTH  DAILY TO TAKE WITH 20 MG TO EQUAL 30 MG DAILY 90 capsule 3    FLUoxetine (PROZAC) 20 MG capsule Take 1 capsule (20 mg) by mouth daily. With 10 mg daily to equal 30. 90 capsule 3    fluticasone propionate (FLONASE) 50 mcg/actuation nasal spray [FLUTICASONE PROPIONATE (FLONASE) 50 MCG/ACTUATION NASAL SPRAY] Apply 1 spray into each nostril daily. 16 g 1    ibuprofen (ADVIL,MOTRIN) 200 MG tablet Take 200-400 mg by mouth every 6 hours as needed      magnesium oxide 500 mg Tab [MAGNESIUM OXIDE 500 MG TAB] Take 500 mg by mouth daily.      multivitamin w/minerals (MULTI-VITAMIN) tablet Take 1 tablet by mouth daily      omeprazole (PRILOSEC) 20 MG DR capsule Take 1 capsule (20 mg) by mouth daily. 90 capsule 3    rosuvastatin (CRESTOR) 5 MG tablet Take 1 tablet (5 mg) by mouth daily. 90 tablet 3    traZODone (DESYREL) 50 MG tablet TAKE 1 TO 2 TABLETS BY MOUTH AT  BEDTIME AS NEEDED FOR SLEEP 180 tablet 3       Allergies   Allergen Reactions    Amoxicillin-Pot Clavulanate     Suture      vicral          Lab Results    Chemistry/lipid CBC Cardiac Enzymes/BNP/TSH/INR   Recent Labs   Lab Test 11/18/24  1401   CHOL 212*   HDL 58   *   TRIG 131     Recent Labs   Lab Test 11/18/24  1401 11/08/23  1049 10/26/22  1001   * 154* 155*     Recent Labs   Lab Test 11/18/24  1401      POTASSIUM 4.8   CHLORIDE 102   CO2 27   *   BUN 21.1   CR 0.86   GFRESTIMATED 70   VIV 10.0     Recent Labs   Lab Test 11/18/24  1401 06/17/24  1547 11/08/23  1049   CR 0.86 0.82 0.96*     Recent Labs   Lab Test 11/18/24  1401 11/08/23  1049 10/26/22  1001   A1C 5.4 5.5 5.6          Recent Labs   Lab Test 06/17/24  1547   WBC 5.8   HGB 14.5   HCT 42.8   MCV 94     "    Recent Labs   Lab Test 06/17/24  1547 11/08/23  1049 10/26/22  1001   HGB 14.5 15.1 15.8*    No results for input(s): \"TROPONINI\" in the last 69099 hours.  No results for input(s): \"BNP\", \"NTBNPI\", \"NTBNP\" in the last 35654 hours.  Recent Labs   Lab Test 06/17/24  1547   TSH 0.56     No results for input(s): \"INR\" in the last 24315 hours.     Nathaniel Jones MD                                      "

## 2025-01-08 NOTE — LETTER
1/8/2025    Rissa Corral MD  [address]  [facility] [address]    RE: Tayler Mcmahon       Dear Colleague,     I had the pleasure of seeing Tayler Mcmahon in the [facility].    HEART CARE ENCOUNTER CONSULTATON NOTE      [facility]  [phone]      Assessment/Recommendations   Assessment/Plan:    Tayler Mcmahon is a very pleasant 74 year old female with PMH of depression, hyperlipidemia, GERD, sleep apnea who presents today to the EP clinic.    Palpitations  - short suns of SVT and possible NSVT(which may be SVT with aberrancy)  - Normal stress echocardiogram  - Continue to monitor at this time  - continue surveillance with your smart watch    2. Hyperlipidemia  - continue Crestor    3. GERD  - continue Pepcid and Prilosec      Time spent: 60 minutes spent on the date of the encounter doing chart review, history and exam, documentation and further activities as noted above.    The longitudinal plan of care for the condition(s) below were addressed during this visit. Due to the added complexity in care, I will continue support in the subsequent management of this condition(s) and with the ongoing continuity of care of this condition(s).          History of Present Illness/Subjective    HPI: Tayler Mcmahon is a very pleasant 74 year old female with PMH of depression, hyperlipidemia, GERD, sleep apnea who presents today to the EP clinic.    In June, Tayler was sleeping and there was a power cut while she was sleeping with a CPAP machine. She says she was woken up by a friend and she remembered having some palpitations and chest pressure at the time.     She had a Zio patch and a stress echocardiogram since then.    Zio patch showed 12 episodes of wide complex beats, upon my review these could be SVT with aberrancy vs NSVT. Low PAC/PVC burden.    Recent ECG(personally reviewed):  From June 2024 WNL    Recent Echocardiogram Results (personally reviewed):    1. Normal  "stress echocardiogram without evidence of stress induced ischemia.  2. Normal resting LV systolic performance with an ejection fraction of 60%.  There is normal improvement in left ventricular systolic performance with a  peak ejection fraction of 75%.  3. No ECG evidence of ischemia.  4. No anginal chest pain reported with exercise.  5. Average functional capacity for age.      Labs below reviewed personally     Physical Examination  Review of Systems   Vitals: /68 (BP Location: Left arm, Patient Position: Sitting, Cuff Size: Adult Large)   Pulse 64   Resp 16   Ht 1.575 m (5' 2\")   Wt 68.9 kg (152 lb)   LMP  (LMP Unknown)   BMI 27.80 kg/m    BMI= Body mass index is 27.8 kg/m .  Wt Readings from Last 3 Encounters:   01/08/25 68.9 kg (152 lb)   11/18/24 68.4 kg (150 lb 12.8 oz)   07/15/24 72.7 kg (160 lb 3.2 oz)       General Appearance:   no distress, normal body habitus   ENT/Mouth: membranes moist, no oral lesions or bleeding gums.      EYES:  no scleral icterus, normal conjunctivae   Neck: no carotid bruits or thyromegaly   Chest/Lungs:   lungs are clear to auscultation, no rales or wheezing, no sternal scar, equal chest wall expansion    Cardiovascular:   Regular. Normal first and second heart sounds with no murmurs, rubs, or gallops; the carotid, radial and posterior tibial pulses are intact, no edema bilaterally    Abdomen:  no organomegaly, masses, bruits, or tenderness; bowel sounds are present   Extremities: no cyanosis or clubbing   Skin: no xanthelasma, warm.    Neurologic: normal  bilateral, no tremors     Psychiatric: alert and oriented x3, calm        Please refer above for cardiac ROS details.        Medical History  Surgical History Family History Social History   Past Medical History:   Diagnosis Date     Appendicitis 11/17/2016     Depression      Diverticulosis      Fibrocystic breast      HLD (hyperlipidemia)      Inverted nipple      PONV (postoperative nausea and vomiting)      " Past Surgical History:   Procedure Laterality Date     ARTHROSCOPY KNEE Left 10/23/2006    Dr. Calvo at [place]     BELPHAROPTOSIS REPAIR Left      HYSTEROSCOPY DIAGNOSTIC      Recorded: 08/20/2008;  Comments: With polyp removed, told benign     LAPAROSCOPIC APPENDECTOMY N/A 11/17/2016    Surgeon: Italo Fontaine MD;  Location: [facility] OR;  Service:      MAMMOPLASTY REDUCTION  1982     Family History   Problem Relation Age of Onset     Hypertension Mother      Transient ischemic attack Mother      Depression Mother      Polymyalgia rheumatica Mother      Lung Cancer Mother 84.00     Diabetes Type 2  Mother      Cancer Mother      Cancer Father 56.00        Tonsil     Colon Cancer Father 57.00     Breast Cancer Maternal Grandmother         mastectomy        Social History     Socioeconomic History     Marital status:      Spouse name: Not on file     Number of children: Not on file     Years of education: Not on file     Highest education level: Not on file   Occupational History     Not on file   Tobacco Use     Smoking status: Never     Smokeless tobacco: Never   Substance and Sexual Activity     Alcohol use: No     Drug use: No     Sexual activity: Never   Other Topics Concern     Not on file   Social History Narrative     Not on file     Social Drivers of Health     Financial Resource Strain: Low Risk  (11/18/2024)    Financial Resource Strain      Within the past 12 months, have you or your family members you live with been unable to get utilities (heat, electricity) when it was really needed?: No   Food Insecurity: Low Risk  (11/18/2024)    Food Insecurity      Within the past 12 months, did you worry that your food would run out before you got money to buy more?: No      Within the past 12 months, did the food you bought just not last and you didn t have money to get more?: No   Transportation Needs: Low Risk  (11/18/2024)    Transportation Needs      Within the past 12 months, has lack of  transportation kept you from medical appointments, getting your medicines, non-medical meetings or appointments, work, or from getting things that you need?: No   Physical Activity: Insufficiently Active (11/18/2024)    Exercise Vital Sign      Days of Exercise per Week: 3 days      Minutes of Exercise per Session: 30 min   Stress: No Stress Concern Present (11/18/2024)    [employer] - Occupational Stress Questionnaire      Feeling of Stress : Not at all   Social Connections: Unknown (11/18/2024)    Social Connection and Isolation Panel [NHANES]      Frequency of Communication with Friends and Family: Not on file      Frequency of Social Gatherings with Friends and Family: Once a week      Attends Alevism Services: Not on file      Active Member of Clubs or Organizations: Not on file      Attends Club or Organization Meetings: Not on file      Marital Status: Not on file   Interpersonal Safety: Low Risk  (11/18/2024)    Interpersonal Safety      Do you feel physically and emotionally safe where you currently live?: Yes      Within the past 12 months, have you been hit, slapped, kicked or otherwise physically hurt by someone?: No      Within the past 12 months, have you been humiliated or emotionally abused in other ways by your partner or ex-partner?: No   Housing Stability: Low Risk  (11/18/2024)    Housing Stability      Do you have housing? : Yes      Are you worried about losing your housing?: No           Medications  Allergies   Current Outpatient Medications   Medication Sig Dispense Refill     acetaminophen (TYLENOL) 500 MG tablet Take 500 mg by mouth every 6 hours as needed       albuterol (PROAIR HFA;PROVENTIL HFA;VENTOLIN HFA) 90 mcg/actuation inhaler [ALBUTEROL (PROAIR HFA;PROVENTIL HFA;VENTOLIN HFA) 90 MCG/ACTUATION INHALER] Inhale 2 puffs every 4 (four) hours as needed for wheezing. 1 each 0     cholecalciferol, vitamin D3, (VITAMIN D3) 2,000 unit cap [CHOLECALCIFEROL,  VITAMIN D3, (VITAMIN D3) 2,000 UNIT CAP] Take 2,000 Units by mouth daily.       co-enzyme Q-10 100 MG CAPS capsule Take 100 mg by mouth daily       cyanocobalamin (VITAMIN B-12) 1000 MCG tablet Take 1,000 mcg by mouth daily 2500 mcg daily       cyclobenzaprine (FLEXERIL) 10 MG tablet TAKE 1 TABLET BY MOUTH 3  TIMES DAILY AS NEEDED FOR  MUSCLE SPASM(S) 30 tablet 1     famotidine (PEPCID) 20 MG tablet Take 1 tablet (20 mg) by mouth 2 times daily. 180 tablet 3     FLUoxetine (PROZAC) 10 MG capsule TAKE 1 CAPSULE BY MOUTH  DAILY TO TAKE WITH 20 MG TO EQUAL 30 MG DAILY 90 capsule 3     FLUoxetine (PROZAC) 20 MG capsule Take 1 capsule (20 mg) by mouth daily. With 10 mg daily to equal 30. 90 capsule 3     fluticasone propionate (FLONASE) 50 mcg/actuation nasal spray [FLUTICASONE PROPIONATE (FLONASE) 50 MCG/ACTUATION NASAL SPRAY] Apply 1 spray into each nostril daily. 16 g 1     ibuprofen (ADVIL,MOTRIN) 200 MG tablet Take 200-400 mg by mouth every 6 hours as needed       magnesium oxide 500 mg Tab [MAGNESIUM OXIDE 500 MG TAB] Take 500 mg by mouth daily.       multivitamin w/minerals (MULTI-VITAMIN) tablet Take 1 tablet by mouth daily       omeprazole (PRILOSEC) 20 MG DR capsule Take 1 capsule (20 mg) by mouth daily. 90 capsule 3     rosuvastatin (CRESTOR) 5 MG tablet Take 1 tablet (5 mg) by mouth daily. 90 tablet 3     traZODone (DESYREL) 50 MG tablet TAKE 1 TO 2 TABLETS BY MOUTH AT  BEDTIME AS NEEDED FOR SLEEP 180 tablet 3       Allergies   Allergen Reactions     Amoxicillin-Pot Clavulanate      Suture      vicral          Lab Results    Chemistry/lipid CBC Cardiac Enzymes/BNP/TSH/INR   Recent Labs   Lab Test 11/18/24  1401   CHOL 212*   HDL 58   *   TRIG 131     Recent Labs   Lab Test 11/18/24  1401 11/08/23  1049 10/26/22  1001   * 154* 155*     Recent Labs   Lab Test 11/18/24  1401      POTASSIUM 4.8   CHLORIDE 102   CO2 27   *   BUN 21.1   CR 0.86   GFRESTIMATED 70   VIV 10.0     Recent Labs  "  Lab Test 11/18/24  1401 06/17/24  1547 11/08/23  1049   CR 0.86 0.82 0.96*     Recent Labs   Lab Test 11/18/24  1401 11/08/23  1049 10/26/22  1001   A1C 5.4 5.5 5.6          Recent Labs   Lab Test 06/17/24  1547   WBC 5.8   HGB 14.5   HCT 42.8   MCV 94        Recent Labs   Lab Test 06/17/24  1547 11/08/23  1049 10/26/22  1001   HGB 14.5 15.1 15.8*    No results for input(s): \"TROPONINI\" in the last 00870 hours.  No results for input(s): \"BNP\", \"NTBNPI\", \"NTBNP\" in the last 92336 hours.  Recent Labs   Lab Test 06/17/24  1547   TSH 0.56     No results for input(s): \"INR\" in the last 64039 hours.     Nathaniel Jones MD                                        Thank you for allowing me to participate in the care of your patient.      Sincerely,     Nathaniel Jones MD     [facility]  cc:   Rissa Corral MD  [address] [place] [address] [address] [place] [address]      "

## 2025-02-07 ENCOUNTER — TRANSFERRED RECORDS (OUTPATIENT)
Dept: HEALTH INFORMATION MANAGEMENT | Facility: CLINIC | Age: 75
End: 2025-02-07
Payer: COMMERCIAL

## 2025-02-10 DIAGNOSIS — E78.5 HYPERLIPIDEMIA LDL GOAL <130: ICD-10-CM

## 2025-02-10 RX ORDER — ROSUVASTATIN CALCIUM 5 MG/1
5 TABLET, COATED ORAL DAILY
Qty: 90 TABLET | Refills: 2 | Status: SHIPPED | OUTPATIENT
Start: 2025-02-10

## 2025-02-10 NOTE — TELEPHONE ENCOUNTER
NEW PHARMACY. Patient requesting rosuvastatin (CRESTOR) 5 MG tablet to be sent to [facility] pharmacy.    Pending Prescriptions:                       Disp   Refills    rosuvastatin (CRESTOR) 5 MG tablet        90 tab*3            Sig: Take 1 tablet (5 mg) by mouth daily.

## 2025-02-10 NOTE — TELEPHONE ENCOUNTER
Medication Question or Refill    Contacts       Contact Date/Time Type Contact Phone/Fax    02/10/2025 11:44 AM CST Phone (Incoming) Tayler Mcmahon (Self) [phone] (M)            What medication are you calling about (include dose and sig)?: rosuvastatin (CRESTOR) 5 MG tablet     Preferred Pharmacy:   [id number] - Replaced by [facility] [address]  [address] [facility] [address]  Phone: [phone] Fax: [phone]    [facility] - [place], [place] - [address] [place] [address] mail services  [address] [facility]  Mail Order pharmacy  [facility] [address]  Phone: [phone] Fax: [phone]    [facility] ([facility]) - [place] [id number] [facility]  [address] [address]  Phone: [phone] Fax: [phone]    [facility] - [place], [place] - [address]  [address]  [facility] [address]  [facility] [address]  Phone: [phone] Fax: [phone]    [id number] - [place], [place] - [address] [place]  [address] [facility] [address]  Phone: [phone] Fax: [phone]      Controlled Substance Agreement on file:   CSA -- Patient Level:    CSA: None found at the patient level.       Who prescribed the medication?: Rissa Corral MD     Do you need a refill? Yes     When did you use the medication last? 2/9/2025    Patient offered an appointment? No    Do you have any questions or concerns?  Yes: Patient would like 3 month supply to be sent to [facility]. Has 1 week left of current supply.      Could we send this information to you in [employer] or would you prefer to receive a phone call?:   Patient would prefer a phone call   Okay to leave a detailed message?: Yes at Home number on file [phone] (home)

## 2025-02-12 ENCOUNTER — TRANSFERRED RECORDS (OUTPATIENT)
Dept: HEALTH INFORMATION MANAGEMENT | Facility: CLINIC | Age: 75
End: 2025-02-12
Payer: COMMERCIAL

## 2025-03-09 ENCOUNTER — HEALTH MAINTENANCE LETTER (OUTPATIENT)
Age: 75
End: 2025-03-09

## 2025-03-12 ENCOUNTER — TRANSFERRED RECORDS (OUTPATIENT)
Dept: HEALTH INFORMATION MANAGEMENT | Facility: CLINIC | Age: 75
End: 2025-03-12
Payer: COMMERCIAL

## 2025-06-03 ENCOUNTER — MYC MEDICAL ADVICE (OUTPATIENT)
Dept: FAMILY MEDICINE | Facility: CLINIC | Age: 75
End: 2025-06-03
Payer: COMMERCIAL

## 2025-07-31 ENCOUNTER — E-VISIT (OUTPATIENT)
Dept: FAMILY MEDICINE | Facility: CLINIC | Age: 75
End: 2025-07-31
Payer: COMMERCIAL

## 2025-07-31 ENCOUNTER — TELEPHONE (OUTPATIENT)
Dept: FAMILY MEDICINE | Facility: CLINIC | Age: 75
End: 2025-07-31

## 2025-07-31 DIAGNOSIS — R19.7 DIARRHEA, UNSPECIFIED TYPE: Primary | ICD-10-CM

## 2025-07-31 DIAGNOSIS — R10.816 EPIGASTRIC ABDOMINAL TENDERNESS, REBOUND TENDERNESS PRESENCE NOT SPECIFIED: ICD-10-CM

## 2025-07-31 NOTE — TELEPHONE ENCOUNTER
Yes, you can use looser spots but please do not use any prenatal, first  OB or well-child, thanks.

## 2025-07-31 NOTE — TELEPHONE ENCOUNTER
Reason for Call:  Appointment Request    Patient requesting this type of appt:  Preventive     Requested provider: Rissa Corral    Reason patient unable to be scheduled: Not within requested timeframe    When does patient want to be seen/preferred time: PT IS DUE AFTER 11/18/25    Comments: N/A    Could we send this information to you in [employer] or would you prefer to receive a phone call?:   Patient would prefer a phone call   Okay to leave a detailed message?: Yes at Cell number on file:    Telephone Information:   Mobile [phone]       Call taken on 7/31/2025 at 3:11 PM by Krystina Brooks

## 2025-08-03 DIAGNOSIS — K21.00 GASTROESOPHAGEAL REFLUX DISEASE WITH ESOPHAGITIS WITHOUT HEMORRHAGE: ICD-10-CM

## 2025-08-04 ENCOUNTER — TELEPHONE (OUTPATIENT)
Dept: FAMILY MEDICINE | Facility: CLINIC | Age: 75
End: 2025-08-04

## 2025-08-04 ENCOUNTER — OFFICE VISIT (OUTPATIENT)
Dept: FAMILY MEDICINE | Facility: CLINIC | Age: 75
End: 2025-08-04
Payer: COMMERCIAL

## 2025-08-04 VITALS
OXYGEN SATURATION: 94 % | WEIGHT: 151 LBS | DIASTOLIC BLOOD PRESSURE: 70 MMHG | BODY MASS INDEX: 27.62 KG/M2 | TEMPERATURE: 97.9 F | HEART RATE: 57 BPM | SYSTOLIC BLOOD PRESSURE: 145 MMHG | RESPIRATION RATE: 16 BRPM

## 2025-08-04 DIAGNOSIS — R10.816 EPIGASTRIC ABDOMINAL TENDERNESS, REBOUND TENDERNESS PRESENCE NOT SPECIFIED: ICD-10-CM

## 2025-08-04 DIAGNOSIS — R19.7 DIARRHEA, UNSPECIFIED TYPE: Primary | ICD-10-CM

## 2025-08-04 LAB
ANION GAP SERPL CALCULATED.3IONS-SCNC: 9 MMOL/L (ref 7–15)
BUN SERPL-MCNC: 20.3 MG/DL (ref 8–23)
CALCIUM SERPL-MCNC: 9.8 MG/DL (ref 8.8–10.4)
CHLORIDE SERPL-SCNC: 104 MMOL/L (ref 98–107)
CREAT SERPL-MCNC: 0.87 MG/DL (ref 0.51–0.95)
EGFRCR SERPLBLD CKD-EPI 2021: 69 ML/MIN/1.73M2
ERYTHROCYTE [DISTWIDTH] IN BLOOD BY AUTOMATED COUNT: 11.8 % (ref 10–15)
ERYTHROCYTE [SEDIMENTATION RATE] IN BLOOD BY WESTERGREN METHOD: 14 MM/HR (ref 0–30)
GLUCOSE SERPL-MCNC: 110 MG/DL (ref 70–99)
HCO3 SERPL-SCNC: 28 MMOL/L (ref 22–29)
HCT VFR BLD AUTO: 40.2 % (ref 35–47)
HGB BLD-MCNC: 13.9 G/DL (ref 11.7–15.7)
MCH RBC QN AUTO: 31.7 PG (ref 26.5–33)
MCHC RBC AUTO-ENTMCNC: 34.6 G/DL (ref 31.5–36.5)
MCV RBC AUTO: 92 FL (ref 78–100)
PLATELET # BLD AUTO: 187 10E3/UL (ref 150–450)
POTASSIUM SERPL-SCNC: 4.7 MMOL/L (ref 3.4–5.3)
RBC # BLD AUTO: 4.38 10E6/UL (ref 3.8–5.2)
SODIUM SERPL-SCNC: 141 MMOL/L (ref 135–145)
WBC # BLD AUTO: 5.7 10E3/UL (ref 4–11)

## 2025-08-04 PROCEDURE — 3078F DIAST BP <80 MM HG: CPT | Performed by: FAMILY MEDICINE

## 2025-08-04 PROCEDURE — 36415 COLL VENOUS BLD VENIPUNCTURE: CPT | Performed by: FAMILY MEDICINE

## 2025-08-04 PROCEDURE — 99214 OFFICE O/P EST MOD 30 MIN: CPT | Performed by: FAMILY MEDICINE

## 2025-08-04 PROCEDURE — 80048 BASIC METABOLIC PNL TOTAL CA: CPT | Performed by: FAMILY MEDICINE

## 2025-08-04 PROCEDURE — 87506 IADNA-DNA/RNA PROBE TQ 6-11: CPT | Performed by: FAMILY MEDICINE

## 2025-08-04 PROCEDURE — 85027 COMPLETE CBC AUTOMATED: CPT | Performed by: FAMILY MEDICINE

## 2025-08-04 PROCEDURE — 3077F SYST BP >= 140 MM HG: CPT | Performed by: FAMILY MEDICINE

## 2025-08-04 PROCEDURE — 85652 RBC SED RATE AUTOMATED: CPT | Performed by: FAMILY MEDICINE

## 2025-08-04 RX ORDER — OMEPRAZOLE 20 MG/1
20 CAPSULE, DELAYED RELEASE ORAL DAILY
Qty: 100 CAPSULE | Refills: 2 | OUTPATIENT
Start: 2025-08-04

## 2025-08-04 RX ORDER — FAMOTIDINE 20 MG/1
20 TABLET, FILM COATED ORAL 2 TIMES DAILY
Qty: 200 TABLET | Refills: 2 | OUTPATIENT
Start: 2025-08-04

## 2025-08-04 ASSESSMENT — PATIENT HEALTH QUESTIONNAIRE - PHQ9
10. IF YOU CHECKED OFF ANY PROBLEMS, HOW DIFFICULT HAVE THESE PROBLEMS MADE IT FOR YOU TO DO YOUR WORK, TAKE CARE OF THINGS AT HOME, OR GET ALONG WITH OTHER PEOPLE: NOT DIFFICULT AT ALL
SUM OF ALL RESPONSES TO PHQ QUESTIONS 1-9: 2
SUM OF ALL RESPONSES TO PHQ QUESTIONS 1-9: 2

## 2025-08-05 LAB
C CAYETANENSIS DNA STL QL NAA+NON-PROBE: NEGATIVE
CAMPYLOBACTER DNA SPEC NAA+PROBE: NEGATIVE
CRYPTOSP DNA STL QL NAA+NON-PROBE: NEGATIVE
EC STX1+STX2 GENES STL QL NAA+NON-PROBE: NEGATIVE
G LAMBLIA DNA STL QL NAA+NON-PROBE: NEGATIVE
NOROVIRUS GI+II RNA STL QL NAA+NON-PROBE: NEGATIVE
SALMONELLA SP RPOD STL QL NAA+PROBE: NEGATIVE
SHIGELLA SP+EIEC IPAH ST NAA+NON-PROBE: NEGATIVE
VIBRIO DNA SPEC NAA+PROBE: NEGATIVE
Y ENTEROCOL DNA STL QL NAA+PROBE: NEGATIVE

## 2025-08-11 ENCOUNTER — HOSPITAL ENCOUNTER (OUTPATIENT)
Dept: CT IMAGING | Facility: HOSPITAL | Age: 75
Discharge: HOME | End: 2025-08-11
Attending: FAMILY MEDICINE | Admitting: FAMILY MEDICINE
Payer: COMMERCIAL

## 2025-08-11 DIAGNOSIS — R19.7 DIARRHEA, UNSPECIFIED TYPE: ICD-10-CM

## 2025-08-11 PROCEDURE — 74176 CT ABD & PELVIS W/O CONTRAST: CPT

## 2025-08-16 DIAGNOSIS — E78.5 HYPERLIPIDEMIA LDL GOAL <130: ICD-10-CM

## 2025-08-18 DIAGNOSIS — E78.5 HYPERLIPIDEMIA LDL GOAL <130: ICD-10-CM

## 2025-08-18 RX ORDER — ROSUVASTATIN CALCIUM 5 MG/1
5 TABLET, COATED ORAL DAILY
Qty: 90 TABLET | Refills: 1 | Status: SHIPPED | OUTPATIENT
Start: 2025-08-18

## 2025-08-18 RX ORDER — ROSUVASTATIN CALCIUM 5 MG/1
5 TABLET, COATED ORAL DAILY
Qty: 100 TABLET | Refills: 2 | OUTPATIENT
Start: 2025-08-18